# Patient Record
Sex: MALE | Race: WHITE | NOT HISPANIC OR LATINO | Employment: FULL TIME | URBAN - METROPOLITAN AREA
[De-identification: names, ages, dates, MRNs, and addresses within clinical notes are randomized per-mention and may not be internally consistent; named-entity substitution may affect disease eponyms.]

---

## 2017-01-06 ENCOUNTER — OFFICE VISIT (OUTPATIENT)
Dept: FAMILY MEDICINE | Facility: CLINIC | Age: 47
End: 2017-01-06
Payer: COMMERCIAL

## 2017-01-06 DIAGNOSIS — G89.4 CHRONIC PAIN SYNDROME: Primary | ICD-10-CM

## 2017-01-06 DIAGNOSIS — M54.2 NECK PAIN: ICD-10-CM

## 2017-01-06 PROCEDURE — 99441 ZZC PHYSICIAN TELEPHONE EVALUATION 5-10 MIN: CPT | Performed by: FAMILY MEDICINE

## 2017-01-06 RX ORDER — OXYCODONE HYDROCHLORIDE AND ACETAMINOPHEN 10; 325 MG/1; MG/1
2 TABLET ORAL 4 TIMES DAILY
Qty: 120 TABLET | Refills: 0 | Status: SHIPPED | OUTPATIENT
Start: 2017-01-18 | End: 2017-01-06

## 2017-01-06 RX ORDER — OXYCODONE HYDROCHLORIDE 30 MG/1
30 TABLET ORAL 4 TIMES DAILY
Qty: 60 TABLET | Refills: 0 | Status: SHIPPED | OUTPATIENT
Start: 2017-01-25 | End: 2017-01-17

## 2017-01-06 RX ORDER — OXYCODONE HYDROCHLORIDE AND ACETAMINOPHEN 10; 325 MG/1; MG/1
2 TABLET ORAL 4 TIMES DAILY
Qty: 120 TABLET | Refills: 0 | Status: SHIPPED | OUTPATIENT
Start: 2017-02-01 | End: 2017-02-10

## 2017-01-06 RX ORDER — DIAZEPAM 10 MG
10 TABLET ORAL AT BEDTIME
Qty: 30 TABLET | Refills: 2 | Status: SHIPPED | OUTPATIENT
Start: 2017-01-11 | End: 2017-02-28

## 2017-01-06 RX ORDER — OXYCODONE HYDROCHLORIDE 30 MG/1
30 TABLET ORAL 4 TIMES DAILY
Qty: 60 TABLET | Refills: 0 | Status: SHIPPED | OUTPATIENT
Start: 2017-02-08 | End: 2017-02-10

## 2017-01-06 NOTE — PROGRESS NOTES
"Jose Luis Gonzáles is a 46 year old male who is being evaluated via a telephone visit.      The patient has been notified of following:     \"This telephone visit will be conducted via a call between you and your physician/provider. We have found that certain health care needs can be provided without the need for a physical exam.  This service lets us provide the care you need with a short phone conversation.  If a prescription is necessary we can send it directly to your pharmacy.  If lab work is needed we can place an order for that and you can then stop by our lab to have the test done at a later time.    We will bill your insurance company for this service.  Please check with your medical insurance if this type of visit is covered. You may be responsible for the cost of this type of visit if insurance coverage is denied.  The typical cost is $30 (10min), $59 (11-20min) and $85 (21-30min).  Most often these visits are shorter than 10 minutes.    If during the course of the call the physician/provider feels a telephone visit is not appropriate, you will not be charged for this service.\"       Consent has been obtained for this service by 2 care team members: yes. See the scanned image in the medical record.    Jose Luis Gonzáles complains of  No chief complaint on file.      I have reviewed and updated the patient's Past Medical History, Social History, Family History and Medication List.    ALLERGIES  Clonidine; Ibuprofen; Seroquel; and Valproic acid    Cristina Hughes CMA  January 6, 2017 4:09 PM           Additional provider notes:   Spoke with patient via phone regarding ongoing issues with pain and with anxiety. Things are actually fairly stable in his life. It was 6 months sobriety. Continue to deal with issues with his ex-girlfriend and mother of his child. Is getting much more handle on it and progressing forward light. Looking for employment this year. Actually brings up the possibility of reducing " pain medication in the upcoming months.    ASSESSMENT / PLAN:  (G89.4) Chronic pain syndrome  (primary encounter diagnosis)  Comment: Stable at current dosage refilled  Plan: PERCOCET  MG per tablet, oxyCODONE         (ROXICODONE) 30 MG IR tablet, diazepam (VALIUM)        10 MG tablet, DISCONTINUED: PERCOCET  MG         per tablet, DISCONTINUED: oxyCODONE         (ROXICODONE) 30 MG IR tablet            (M54.2) Neck pain  Comment: As above  Plan: PERCOCET  MG per tablet, oxyCODONE         (ROXICODONE) 30 MG IR tablet, diazepam (VALIUM)        10 MG tablet, DISCONTINUED: PERCOCET  MG         per tablet, DISCONTINUED: oxyCODONE         (ROXICODONE) 30 MG IR tablet               I have reviewed the note as documented above.  This accurately captures the substance of my conversation with the patient,  SJeremi Cui M.D.     Total time of call between patient and provider was 6 minutes

## 2017-01-12 ENCOUNTER — TELEPHONE (OUTPATIENT)
Dept: FAMILY MEDICINE | Facility: CLINIC | Age: 47
End: 2017-01-12

## 2017-01-12 DIAGNOSIS — M54.2 NECK PAIN: ICD-10-CM

## 2017-01-12 DIAGNOSIS — G89.4 CHRONIC PAIN SYNDROME: Primary | ICD-10-CM

## 2017-01-12 NOTE — TELEPHONE ENCOUNTER
Reason for Call:  Other prescription    Detailed comments: per message below once done with prior authorization please contact [facility] at [phone] and let them know its been taken care of so that [facility] can bill insurance.    Phone Number Patient can be reached at: Home number on file [phone] (home)    Best Time: ANY    Can we leave a detailed message on this number? YES    Call taken on 1/12/2017 at 8:51 AM by Teetee Aaron

## 2017-01-12 NOTE — TELEPHONE ENCOUNTER
Routing to PCP to advise, would u like PA started or another medication prescribed?      Mayte Eli, CMA

## 2017-01-12 NOTE — TELEPHONE ENCOUNTER
Reason for Call:  Other prior authorization     Detailed comments: pt calling again for follow states hasnt heard anything back on this please advise and contact pt with follow up     Phone Number Patient can be reached at: Home number on file [phone] (home)    Best Time: ANY    Can we leave a detailed message on this number? YES    Call taken on 1/12/2017 at 2:06 PM by Teetee Aaron

## 2017-01-12 NOTE — TELEPHONE ENCOUNTER
Reason for Call:  Other call back    Detailed comments: Milena is calling from [facility], ph. #[phone] wanting to f/u on PA-Medication request. Milena stated that patient has contacted them several times today and had actually contacted our clinic, was promised that PA would be done today and would get this medication.//Please advise. Thank you.    Phone Number Patient can be reached at: Other phone number:  [phone]    Best Time: Anytime    Can we leave a detailed message on this number? YES    Call taken on 1/12/2017 at 2:25 PM by Mele Jackman

## 2017-01-12 NOTE — TELEPHONE ENCOUNTER
Reason for Call:  Other prescription    Detailed comments: Milena calling from [employer] in [place] stating that pt needs new prior authorization for oxyCODONE (ROXICODONE) 30 MG IR tablet due to has new insurance and the last prior authorization is no longer good for this new insurance. Please advise and contact Insurance company at [phone] , states prior authorization can be done over the phone by calling insurance company.    Phone Number Patient can be reached at: Home number on file [phone] (home)    Best Time: ANY    Can we leave a detailed message on this number? YES    Call taken on 1/12/2017 at 8:48 AM by Teetee Aaron

## 2017-01-13 ENCOUNTER — VIRTUAL VISIT (OUTPATIENT)
Dept: FAMILY MEDICINE | Facility: CLINIC | Age: 47
End: 2017-01-13
Payer: COMMERCIAL

## 2017-01-13 DIAGNOSIS — G89.4 CHRONIC PAIN SYNDROME: Primary | ICD-10-CM

## 2017-01-13 PROCEDURE — 99441 ZZC PHYSICIAN TELEPHONE EVALUATION 5-10 MIN: CPT | Performed by: FAMILY MEDICINE

## 2017-01-13 RX ORDER — OXYCODONE HYDROCHLORIDE 10 MG/1
20-30 TABLET ORAL 4 TIMES DAILY PRN
Qty: 100 TABLET | Refills: 0 | Status: SHIPPED | OUTPATIENT
Start: 2017-01-13 | End: 2017-01-17 | Stop reason: ALTCHOICE

## 2017-01-13 NOTE — PROGRESS NOTES
"Jose Luis Gonzáles is a 46 year old male who is being evaluated via a telephone visit.      The patient has been notified of following:     \"This telephone visit will be conducted via a call between you and your physician/provider. We have found that certain health care needs can be provided without the need for a physical exam.  This service lets us provide the care you need with a short phone conversation.  If a prescription is necessary we can send it directly to your pharmacy.  If lab work is needed we can place an order for that and you can then stop by our lab to have the test done at a later time.    We will bill your insurance company for this service.  Please check with your medical insurance if this type of visit is covered. You may be responsible for the cost of this type of visit if insurance coverage is denied.  The typical cost is $30 (10min), $59 (11-20min) and $85 (21-30min).  Most often these visits are shorter than 10 minutes.    If during the course of the call the physician/provider feels a telephone visit is not appropriate, you will not be charged for this service.\"       Consent has been obtained for this service by 2 care team members: yes. See the scanned image in the medical record.    Jose Luis Gonzáles complains of  Medication Problem      I have reviewed and updated the patient's Past Medical History, Social History, Family History and Medication List.    ALLERGIES  Clonidine; Ibuprofen; Seroquel; and Valproic acid    CAKELLY (MA signature)    Additional provider notes:   I spoke patient via phone regarding his medications. We had authorization for a number of months already but as soon as the calendar turn 2017 evidently they are no longer valid. As far as he knows issue is with the 30 mg oxycodone tablets. I received a form and fax that off this morning for authorization. We are still waiting to hear back approval.  May need to bridge the gap with a different agent until coverage in " place. We had a discussion about equivalent dosages and agents and what reactions he may experience because of this.  He will check with the pharmacy and I will check with our staff.    ASSESSMENT / PLAN:  (G89.4) Chronic pain syndrome  (primary encounter diagnosis)  Comment: Discussion as above.  Plan:      I have reviewed the note as documented above.  This accurately captures the substance of my conversation with the patient,  JANET Cui M.D.     Total time of call between patient and provider was 8 minutes

## 2017-01-13 NOTE — TELEPHONE ENCOUNTER
Pt's pharmacy is calling and  needs someone to call pts insurance company at [phone] to roxi as a high priority because pt is leaving town.  Abbey Lange RN

## 2017-01-13 NOTE — TELEPHONE ENCOUNTER
Form for oxycodone came through and placed on top bin for completion. PA has not been started for this did you want want to do so?      Ashley Disla MA

## 2017-01-13 NOTE — TELEPHONE ENCOUNTER
Jose Luis spoke with pharmacy and was informed if he takes the RX to another pharmacy the PA will have to be restarted. Pt is wondering if PCP could provide a different RX of a different Medication until th e PA is completed.  Pt would  today.  Please advise.  Best contact number [phone]    JANET Ro MA

## 2017-01-16 ENCOUNTER — TELEPHONE (OUTPATIENT)
Dept: FAMILY MEDICINE | Facility: CLINIC | Age: 47
End: 2017-01-16

## 2017-01-16 NOTE — TELEPHONE ENCOUNTER
[facility] - [phone]    Pharmacist not comfortable filling Oxycodone 30 MG medication until ok from PCP listed below given.     They are calling stating that 30 MG tablets are too early to fill because pt picked up 10 MG tablets on 1/13/17 with same directions.  Before fill medication they are wanting ok from PCP to fill 30 MG tablets (as they looked at  database regarding 10 MG tablets)    It seems like he should have enough of the medication until Feb, 2017 before he needs 30 MG tablets per pharmacist.    Gave pharmacist our # and name if pt has questions.        Luciano Berman MA

## 2017-01-16 NOTE — TELEPHONE ENCOUNTER
Oxycodone IR 10 mg tablets rx'd on 1/13/17 should last 8-10 days. He would be due for fill of the 30 mg tabs on 1/21-23/17.   No fill today but pcp can also review this tomorrow

## 2017-01-16 NOTE — TELEPHONE ENCOUNTER
Received PA approval for pt's medication -     Faxed to pharmacy - placed in [facility].    Luciano Berman MA

## 2017-01-16 NOTE — TELEPHONE ENCOUNTER
Received new completed PA form - faxed and placed in PA folder until we receive response from insurance.    Luciano Berman MA

## 2017-01-16 NOTE — TELEPHONE ENCOUNTER
Reason for Call:  Medication or medication refill:    Do you use a [facility]?  Name of the pharmacy and phone number for the current request:  n/a    Name of the medication requested: oxycodone     Other request: Call Valerie @ [facility] in [place] has a question re:  Oxycodone script.  Pt is anxious to get medication.     Can we leave a detailed message on this number? YES    Phone number patient can be reached at: Cell number on file:    Telephone Information:   Mobile [phone]       Best Time: anytime    Call taken on 1/16/2017 at 4:23 PM by Emily Lopez

## 2017-01-17 ENCOUNTER — VIRTUAL VISIT (OUTPATIENT)
Dept: FAMILY MEDICINE | Facility: CLINIC | Age: 47
End: 2017-01-17
Payer: COMMERCIAL

## 2017-01-17 DIAGNOSIS — M54.2 NECK PAIN: ICD-10-CM

## 2017-01-17 DIAGNOSIS — G89.4 CHRONIC PAIN SYNDROME: Primary | ICD-10-CM

## 2017-01-17 PROCEDURE — 99441 ZZC PHYSICIAN TELEPHONE EVALUATION 5-10 MIN: CPT | Performed by: FAMILY MEDICINE

## 2017-01-17 RX ORDER — OXYCODONE HYDROCHLORIDE 30 MG/1
30 TABLET ORAL 4 TIMES DAILY
Qty: 60 TABLET | Refills: 0 | Status: SHIPPED | OUTPATIENT
Start: 2017-01-18 | End: 2017-02-10

## 2017-01-17 NOTE — PROGRESS NOTES
"  SUBJECTIVE:                                                    Jose Luis Gonzáles is a 46 year old male who presents to clinic today for the following health issues:    1. Follow up on pain medications - pt states he has some of the 10 MG tablets still - please review telephone encounter from 1/16/17.   - pt states he overdid it this weekend -     Jose Luis Gonzáles is a 46 year old male who is being evaluated via a telephone visit.      The patient has been notified of following:     \"This telephone visit will be conducted via a call between you and your physician/provider. We have found that certain health care needs can be provided without the need for a physical exam.  This service lets us provide the care you need with a short phone conversation.  If a prescription is necessary we can send it directly to your pharmacy.  If lab work is needed we can place an order for that and you can then stop by our lab to have the test done at a later time.    We will bill your insurance company for this service.  Please check with your medical insurance if this type of visit is covered. You may be responsible for the cost of this type of visit if insurance coverage is denied.  The typical cost is $30 (10min), $59 (11-20min) and $85 (21-30min).  Most often these visits are shorter than 10 minutes.    If during the course of the call the physician/provider feels a telephone visit is not appropriate, you will not be charged for this service.\"       Consent has been obtained for this service by 1 care team members: yes. See the scanned image in the medical record.    Jose Luis Gonzáles complains of  Recheck Medication      I have reviewed and updated the patient's Past Medical History, Social History, Family History and Medication List.    ALLERGIES  Clonidine; Ibuprofen; Seroquel; and Valproic acid    Will Berman MA   (MA signature)    Additional provider notes:   Spoke with patient via phone regarding chronic pain " disorder. We have been awaiting a decision on coverage of his oxycodone 30 mg tablets. I prescribed the 10 mg tablets in lieu of the 30 mg tablets, not knowing when authorization would be decided.  It has now been approved and the patient needs a new prescription for 30 mg tablets. However he is concerned about when the pharmacy will be able to fill this. By dates it should be either the 20th 21st of this month and we reviewed his prescriptions through the chart and through the database. Patient is doing okay for now just wants to make sure he is not going to run out.  Has been quite active recently (snowmobiling), working at the boat show upcoming and wants to make sure he is ready for that    ASSESSMENT / PLAN:  (G89.4) Chronic pain syndrome  (primary encounter diagnosis)  Comment: Looks like we can get back on his stable and monitor regimented dosage  Plan: oxyCODONE (ROXICODONE) 30 MG IR tablet            (M54.2) Neck pain  Comment: As above  Plan: oxyCODONE (ROXICODONE) 30 MG IR tablet             I have reviewed the note as documented above.  This accurately captures the substance of my conversation with the patient,  JANET Cui M.D.     Total time of call between patient and provider was 6 minutes

## 2017-01-17 NOTE — TELEPHONE ENCOUNTER
Called pt and gave him info below. Pt understood and said that he is ok on medication right now. He was just trying to drop off 10 mg endocet script early to be on file because of the location of the pharmacy.     Called pharmacy and gave them info below to not fill. Pharmacist will wait to hear back on any additional info from PCP below proceeding with refill.     Routing to PCP to review when he returns.    Mayte Eli, CMA

## 2017-01-27 DIAGNOSIS — F41.9 ANXIETY: Primary | ICD-10-CM

## 2017-01-27 RX ORDER — CLONAZEPAM 1 MG/1
TABLET ORAL
Qty: 180 TABLET | Refills: 0 | Status: SHIPPED | OUTPATIENT
Start: 2017-01-27 | End: 2017-02-23

## 2017-01-27 NOTE — TELEPHONE ENCOUNTER
CLONAZEPAM       Last Written Prescription Date:  12/28/16  Last Fill Quantity: 180,   # refills: 0  Last Office Visit with [facility], [facility] or [facility] prescribing provider: 01/6/17 Dr. Cui    Future Office visit:       Routing refill request to provider for review/approval because:  Drug not on the [facility], [facility] or [facility] refill protocol or controlled substance    TRAVON Velasco)

## 2017-02-08 ENCOUNTER — TELEPHONE (OUTPATIENT)
Dept: FAMILY MEDICINE | Facility: CLINIC | Age: 47
End: 2017-02-08

## 2017-02-08 DIAGNOSIS — M54.2 NECK PAIN: ICD-10-CM

## 2017-02-08 DIAGNOSIS — G89.4 CHRONIC PAIN SYNDROME: Primary | ICD-10-CM

## 2017-02-08 NOTE — TELEPHONE ENCOUNTER
Reason for Call:  Other     Detailed comments: patient right now cannot make an appointment, trying to fix his vehicle. Patient wants to talk to Dr Cui about what is going on in his life and having adverse side effects on medication.    [facility] [address] [place] [address] [address] AT [facility]    Phone Number Patient can be reached at: Home number on file [phone] (home)    Best Time: any    Can we leave a detailed message on this number? YES    Call taken on 2/8/2017 at 2:46 PM by Lili Del Angel

## 2017-02-10 RX ORDER — OXYCODONE HYDROCHLORIDE AND ACETAMINOPHEN 10; 325 MG/1; MG/1
2 TABLET ORAL 4 TIMES DAILY
Qty: 120 TABLET | Refills: 0 | Status: SHIPPED | OUTPATIENT
Start: 2017-02-15 | End: 2017-02-28

## 2017-02-10 RX ORDER — OXYCODONE HYDROCHLORIDE 30 MG/1
30 TABLET ORAL 4 TIMES DAILY
Qty: 60 TABLET | Refills: 0 | Status: SHIPPED | OUTPATIENT
Start: 2017-02-22 | End: 2017-02-28

## 2017-02-10 NOTE — TELEPHONE ENCOUNTER
Reason for Call:  Other prescription    Detailed comments: 10 mg endocet waiting on this rx can you please call back and advise    Phone Number Patient can be reached at: [phone] (L  Best Time: any    Can we leave a detailed message on this number? YES    Call taken on 2/10/2017 at 11:45 AM by Phoebe Corley

## 2017-02-10 NOTE — TELEPHONE ENCOUNTER
oxycodone  Last Written Prescription Date: 1/18/17  Last Fill Quantity: 60,  # refills: 0   Last Office Visit with [facility], [facility] or [facility] prescribing provider: 1/17/17    Luciano Berman MA

## 2017-02-23 DIAGNOSIS — F41.9 ANXIETY: ICD-10-CM

## 2017-02-24 NOTE — TELEPHONE ENCOUNTER
clonazePAM      Last Written Prescription Date:  1/27/17  Last Fill Quantity: 180,   # refills: 0  Last Office Visit with [facility], [facility] or [facility] prescribing provider: 1/6/17 Dr. Cui    Future Office visit:    Next 5 appointments (look out 90 days)     Feb 28, 2017  6:40 PM CST   [facility]phone Visit with [facility]ui [facility]   [facility]    [address] [place] [id number] [phone]                   Routing refill request to provider for review/approval because:  Drug not on the [facility], [facility] or [facility] refill protocol or controlled substance    TRAVON Velasco (R)

## 2017-02-26 RX ORDER — CLONAZEPAM 1 MG/1
TABLET ORAL
Qty: 180 TABLET | Refills: 0 | Status: SHIPPED | OUTPATIENT
Start: 2017-02-26 | End: 2017-03-24

## 2017-02-28 ENCOUNTER — VIRTUAL VISIT (OUTPATIENT)
Dept: FAMILY MEDICINE | Facility: CLINIC | Age: 47
End: 2017-02-28
Payer: COMMERCIAL

## 2017-02-28 DIAGNOSIS — M54.2 NECK PAIN: ICD-10-CM

## 2017-02-28 DIAGNOSIS — G89.4 CHRONIC PAIN SYNDROME: ICD-10-CM

## 2017-02-28 PROCEDURE — 99441 ZZC PHYSICIAN TELEPHONE EVALUATION 5-10 MIN: CPT | Performed by: FAMILY MEDICINE

## 2017-02-28 RX ORDER — DIAZEPAM 10 MG
10 TABLET ORAL AT BEDTIME
Qty: 30 TABLET | Refills: 2 | Status: SHIPPED | OUTPATIENT
Start: 2017-02-28 | End: 2017-03-20

## 2017-02-28 RX ORDER — OXYCODONE HYDROCHLORIDE AND ACETAMINOPHEN 10; 325 MG/1; MG/1
2 TABLET ORAL 4 TIMES DAILY
Qty: 120 TABLET | Refills: 0 | Status: SHIPPED | OUTPATIENT
Start: 2017-03-01 | End: 2017-02-28

## 2017-02-28 RX ORDER — OXYCODONE HYDROCHLORIDE AND ACETAMINOPHEN 10; 325 MG/1; MG/1
2 TABLET ORAL 4 TIMES DAILY
Qty: 120 TABLET | Refills: 0 | Status: SHIPPED | OUTPATIENT
Start: 2017-03-15 | End: 2017-03-13

## 2017-02-28 RX ORDER — OXYCODONE HYDROCHLORIDE 30 MG/1
30 TABLET ORAL 4 TIMES DAILY
Qty: 60 TABLET | Refills: 0 | Status: SHIPPED | OUTPATIENT
Start: 2017-03-08 | End: 2017-03-17

## 2017-02-28 ASSESSMENT — ANXIETY QUESTIONNAIRES
5. BEING SO RESTLESS THAT IT IS HARD TO SIT STILL: NEARLY EVERY DAY
3. WORRYING TOO MUCH ABOUT DIFFERENT THINGS: NEARLY EVERY DAY
6. BECOMING EASILY ANNOYED OR IRRITABLE: MORE THAN HALF THE DAYS
1. FEELING NERVOUS, ANXIOUS, OR ON EDGE: NEARLY EVERY DAY
7. FEELING AFRAID AS IF SOMETHING AWFUL MIGHT HAPPEN: NEARLY EVERY DAY
2. NOT BEING ABLE TO STOP OR CONTROL WORRYING: NEARLY EVERY DAY
IF YOU CHECKED OFF ANY PROBLEMS ON THIS QUESTIONNAIRE, HOW DIFFICULT HAVE THESE PROBLEMS MADE IT FOR YOU TO DO YOUR WORK, TAKE CARE OF THINGS AT HOME, OR GET ALONG WITH OTHER PEOPLE: SOMEWHAT DIFFICULT
GAD7 TOTAL SCORE: 20

## 2017-02-28 ASSESSMENT — PATIENT HEALTH QUESTIONNAIRE - PHQ9: 5. POOR APPETITE OR OVEREATING: NEARLY EVERY DAY

## 2017-02-28 NOTE — MR AVS SNAPSHOT
After Visit Summary   2/28/2017    Jose Luis Christoph Gonzáles    MRN: [id number]           Patient Information     Date Of Birth          1970        Visit Information        Provider Department      2/28/2017 6:40 PM Tonie Cui MD [facility]        Today's Diagnoses     Chronic pain syndrome        Neck pain           Follow-ups after your visit        Who to contact     If you have questions or need follow up information about today's clinic visit or your schedule please contact [facility] directly at [phone].  Normal or non-critical lab and imaging results will be communicated to you by [employer], letter or phone within 4 business days after the clinic has received the results. If you do not hear from us within 7 days, please contact the clinic through [employer] or phone. If you have a critical or abnormal lab result, we will notify you by phone as soon as possible.  Submit refill requests through [employer] or call your pharmacy and they will forward the refill request to us. Please allow 3 business days for your refill to be completed.          Additional Information About Your Visit        MyChart Information     [employer] gives you secure access to your electronic health record. If you see a primary care provider, you can also send messages to your care team and make appointments. If you have questions, please call your primary care clinic.  If you do not have a primary care provider, please call [phone] and they will assist you.        Care EveryWhere ID     This is your Care EveryWhere ID. This could be used by other organizations to access your [facility] medical records  [id number]         Blood Pressure from Last 3 Encounters:   12/12/16 128/78   11/22/16 128/84   11/02/16 118/76    Weight from Last 3 Encounters:   12/12/16 171 lb 1.6 oz (77.6 kg)   11/22/16 168 lb 1.6 oz (76.2 kg)   11/02/16 171 lb (77.6 kg)              Today, you had the  following     No orders found for display         Today's Medication Changes          These changes are accurate as of: 2/28/17  7:20 PM.  If you have any questions, ask your nurse or doctor.               Start taking these medicines.        Dose/Directions    PERCOCET  MG per tablet   Used for:  Chronic pain syndrome, Neck pain   Generic drug:  oxyCODONE-acetaminophen        Dose:  2 tablet   Start taking on:  3/15/2017   Take 2 tablets by mouth 4 times daily In combination with oxy IR.   Quantity:  120 tablet   Refills:  0            Where to get your medicines      Some of these will need a paper prescription and others can be bought over the counter.  Ask your nurse if you have questions.     Bring a paper prescription for each of these medications     diazepam 10 MG tablet    oxyCODONE 30 MG IR tablet    PERCOCET  MG per tablet                Primary Care Provider Office Phone # Fax #    Tonie Zach Cui [phone]       [place] [id number]        Thank you!     Thank you for choosing [facility]  for your care. Our goal is always to provide you with excellent care. Hearing back from our patients is one way we can continue to improve our services. Please take a few minutes to complete the written survey that you may receive in the mail after your visit with us. Thank you!             Your Updated Medication List - Protect others around you: Learn how to safely use, store and throw away your medicines at www.disposemymeds.org.          This list is accurate as of: 2/28/17  7:20 PM.  Always use your most recent med list.                   Brand Name Dispense Instructions for use    clonazePAM 1 MG tablet    klonoPIN    180 tablet    TAKE 1-2 TABLETS BY MOUTH THREE TIMES DAILY AS NEEDED FOR ANXIETY       diazepam 10 MG tablet    VALIUM    30 tablet    Take 1 tablet (10 mg) by mouth At Bedtime       finasteride 5  MG tablet    PROSCAR    30 tablet    Take 1 tablet (5 mg) by mouth daily       omeprazole 20 MG tablet    priLOSEC OTC    90 tablet    Take 1 tablet (20 mg) by mouth daily       oxyCODONE 30 MG IR tablet   Start taking on:  3/8/2017    ROXICODONE    60 tablet    Take 1 tablet (30 mg) by mouth 4 times daily In combination with percocet.       PERCOCET  MG per tablet   Generic drug:  oxyCODONE-acetaminophen   Start taking on:  3/15/2017     120 tablet    Take 2 tablets by mouth 4 times daily In combination with oxy IR.       polyethylene glycol powder    MIRALAX/GLYCOLAX    527 g    STIR 17 GM OF POWDER (SEE MINGO INSIDE CAP) IN 8-OZ OF LIQUID UNTIL COMPLETELY DISSOLVED. DRINK THE SOLUTION DAILY OR AS DIRECTED.       tretinoin 0.1 % cream    RETIN-A    45 g    Apply topically At Bedtime

## 2017-03-01 ENCOUNTER — TELEPHONE (OUTPATIENT)
Dept: FAMILY MEDICINE | Facility: CLINIC | Age: 47
End: 2017-03-01

## 2017-03-01 ASSESSMENT — ANXIETY QUESTIONNAIRES: GAD7 TOTAL SCORE: 20

## 2017-03-01 ASSESSMENT — PATIENT HEALTH QUESTIONNAIRE - PHQ9: SUM OF ALL RESPONSES TO PHQ QUESTIONS 1-9: 13

## 2017-03-01 NOTE — PROGRESS NOTES
"  SUBJECTIVE:                                                    Jose Luis Gonzáles is a 46 year old male who presents to clinic today for the following health issues:      Anxiety Follow-Up    Status since last visit: Worsened  - hasn't been taking his meds while driving his kids around    Other associated symptoms:Depression    Complicating factors:   Significant life event: Yes-  Has custody of his daughter, trying to get custody of his son -- pt states close friend had their dad die (was his parent's best friend)   Current substance abuse: None  Depression symptoms: Yes-    ODALYS-7 SCORE 1/13/2016 8/19/2016 9/26/2016   Total Score - - -   Total Score 12 18 12        GAD7         Chronic Pain Follow-Up       Type / Location of Pain: back  Analgesia/pain control:       Recent changes:  improved      Overall control: Tolerable with discomfort  Activity level/function:      Daily activities:  Able to do all daily activities    Work:  not applicable  Adverse effects:  No  Adherance    Taking medication as directed?  Yes    Participating in other treatments: Spine X (back exercises)  Risk Factors:    Sleep:  Poor    Mood/anxiety:  worsened    Recent family or social stressors:  none noted    Other aggravating factors: none  PHQ-9 SCORE 1/13/2016 8/19/2016 9/26/2016   Total Score - - -   Total Score 13 22 8     ODALYS-7 SCORE 1/13/2016 8/19/2016 9/26/2016   Total Score - - -   Total Score 12 18 12     Encounter-Level CSA - 12/12/2016:                 Controlled Substance Agreement - Scan on 12/13/2016 10:02 AM : CONTROLLED SUBSTANCE AGREEMENT 12/12/16 (below)                 Jose Luis Gonzáles is a 46 year old male who is being evaluated via a telephone visit.      The patient has been notified of following:     \"This telephone visit will be conducted via a call between you and your physician/provider. We have found that certain health care needs can be provided without the need for a physical exam.  This service lets us " "provide the care you need with a short phone conversation.  If a prescription is necessary we can send it directly to your pharmacy.  If lab work is needed we can place an order for that and you can then stop by our lab to have the test done at a later time.    We will bill your insurance company for this service.  Please check with your medical insurance if this type of visit is covered. You may be responsible for the cost of this type of visit if insurance coverage is denied.  The typical cost is $30 (10min), $59 (11-20min) and $85 (21-30min).  Most often these visits are shorter than 10 minutes.    If during the course of the call the physician/provider feels a telephone visit is not appropriate, you will not be charged for this service.\"       Consent has been obtained for this service by 1 care team members: yes. See the scanned image in the medical record.    Jose Luis Christoph Gonzáles complains of  Back Pain and Anxiety      I have reviewed and updated the patient's Past Medical History, Social History, Family History and Medication List.    ALLERGIES  Clonidine; Ibuprofen; Seroquel [quetiapine]; and Valproic acid    Will Berman MA   (MA signature)    Additional provider notes:     Contacted via phone in follow-up of chronic pain. Doing well at current dosage. Anxiety is been a bit heightened with some ongoing home issues that are actually going his way for once. Patient is on the verge of getting custody of all of his kids and CPS is involved in supporting him. He feels much more hopeful about this. No side effects from the medication and he like to continue with the current dosage    ASSESSMENT / PLAN:  (G89.4) Chronic pain syndrome  Comment: Stable on current dosage. Continue to monitor  Plan: PERCOCET  MG per tablet, oxyCODONE         (ROXICODONE) 30 MG IR tablet, diazepam (VALIUM)        10 MG tablet, DISCONTINUED: PERCOCET  MG         per tablet            (M54.2) Neck pain  Comment: As above  Plan: " PERCOCET  MG per tablet, oxyCODONE         (ROXICODONE) 30 MG IR tablet, diazepam (VALIUM)        10 MG tablet, DISCONTINUED: PERCOCET  MG         per tablet           .    I have reviewed the note as documented above.  This accurately captures the substance of my conversation with the patient,  SJeremi Cui M.D.     Total time of call between patient and provider was 6 minutes

## 2017-03-01 NOTE — TELEPHONE ENCOUNTER
Jose Luis spoke with Dr Cui Yesterday 02-28; scripts should be at the /endocet brand); Rep unable to reach anyone to check on scripts;   Pt plans to head to the clinic in about one hour; (9am);   Please call [phone]  Thank you  TUAN Krueger  Pt Rep [facility]

## 2017-03-08 ENCOUNTER — TELEPHONE (OUTPATIENT)
Dept: NURSING | Facility: CLINIC | Age: 47
End: 2017-03-08

## 2017-03-08 ENCOUNTER — TELEPHONE (OUTPATIENT)
Dept: FAMILY MEDICINE | Facility: CLINIC | Age: 47
End: 2017-03-08
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
## 2017-03-08 NOTE — TELEPHONE ENCOUNTER
This writer attempted to contact Jose Luis on 03/08/17.    Was call answered?  No.  Left message on voicemail with information to call me back.    If patient calls back, please Contact Clinic RN team. If no one available, send encounter message    Akilah Rogers

## 2017-03-13 DIAGNOSIS — M54.2 NECK PAIN: ICD-10-CM

## 2017-03-13 DIAGNOSIS — G89.4 CHRONIC PAIN SYNDROME: ICD-10-CM

## 2017-03-13 RX ORDER — OXYCODONE HYDROCHLORIDE AND ACETAMINOPHEN 10; 325 MG/1; MG/1
2 TABLET ORAL 4 TIMES DAILY
Qty: 120 TABLET | Refills: 0 | Status: SHIPPED | OUTPATIENT
Start: 2017-03-14 | End: 2017-03-17

## 2017-03-13 NOTE — TELEPHONE ENCOUNTER
LM informing pt.  New rx placed at  with note, that pt needs to turn in Rx dated 3/15/17.    Jenny BREEN, Patient Care
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
## 2017-03-17 DIAGNOSIS — M54.2 NECK PAIN: ICD-10-CM

## 2017-03-17 DIAGNOSIS — G89.4 CHRONIC PAIN SYNDROME: ICD-10-CM

## 2017-03-17 NOTE — TELEPHONE ENCOUNTER
Reason for Call:  Medication or medication refill:    Do you use a [facility]?  Name of the pharmacy and phone number for the current request:  Written RX    Name of the medication requested: Valium 10 mg, Oxycodone 30 mg, Percocet  mg    Other request: please call when approved so my mom Evette Carnes can   Also please call me back about the broken ribs - sending [employer] message    Can we leave a detailed message on this number? YES    Phone number patient can be reached at: Cell number on file:    Telephone Information:   Mobile [phone]       Best Time: any     Call taken on 3/17/2017 at 3:54 PM by Phoebe Corley

## 2017-03-17 NOTE — TELEPHONE ENCOUNTER
Roxicodone    30mg  Last Written Prescription Date:  03/08/17  Last Fill Quantity: 60,   # refills: 0  Last Office Visit with [facility], [facility] or [facility] prescribing provider: 1/6/17 Dr. Cui    Future Office visit:    Next 5 appointments (look out 90 days)     Apr 03, 2017  9:40 AM CDT   Telephone Visit with Tonie Cui MD   [facility]    [address] [place] [address]   [phone]                   Routing refill request to provider for review/approval because:  Drug not on the [facility], [facility] or [facility] refill protocol or controlled substance    Percocet      Last Written Prescription Date:  03/14/17  Last Fill Quantity: 120,   # refills: 0  Last Office Visit with [facility], [facility] or [facility] prescribing provider: 01/6/17 Dr. Cui    Future Office visit:    Next 5 appointments (look out 90 days)     Apr 03, 2017  9:40 AM CDT   Telephone Visit with [facility]ie Cui MD   [facility]    [address] [place] [address]   [phone]                   Routing refill request to provider for review/approval because:  Drug not on the [facility], [facility] or [facility] refill protocol or controlled substance    Valium      Last Written Prescription Date:  02/28/17  Last Fill Quantity: 30,   # refills: 2  Last Office Visit with [facility], [facility] or [facility] prescribing provider: 01/6/17 Dr. Cui    Future Office visit:    Next 5 appointments (look out 90 days)     Apr 03, 2017  9:40 AM CDT   Telephone Visit with Tonie Cui MD   [facility]    [address] [place] [address]   [phone]                   Routing refill request to provider for review/approval because:  Drug not on the [facility], [facility] or [facility] refill protocol or controlled substance

## 2017-03-19 RX ORDER — DIAZEPAM 10 MG
10 TABLET ORAL AT BEDTIME
Qty: 30 TABLET | Refills: 2 | OUTPATIENT
Start: 2017-03-19

## 2017-03-19 RX ORDER — OXYCODONE HYDROCHLORIDE AND ACETAMINOPHEN 10; 325 MG/1; MG/1
2 TABLET ORAL 4 TIMES DAILY
Qty: 120 TABLET | Refills: 0 | Status: SHIPPED | OUTPATIENT
Start: 2017-03-28 | End: 2017-03-24

## 2017-03-19 RX ORDER — OXYCODONE HYDROCHLORIDE 30 MG/1
30 TABLET ORAL 4 TIMES DAILY
Qty: 60 TABLET | Refills: 0 | Status: SHIPPED | OUTPATIENT
Start: 2017-03-22 | End: 2017-03-24

## 2017-03-19 NOTE — TELEPHONE ENCOUNTER
Refilled pain meds - dated appropriately.  Too early for valium - I prescribed 3 months worth a few weeks ago

## 2017-03-20 RX ORDER — DIAZEPAM 10 MG
10 TABLET ORAL 2 TIMES DAILY PRN
Qty: 60 TABLET | Refills: 0 | Status: SHIPPED | OUTPATIENT
Start: 2017-03-20 | End: 2017-04-18

## 2017-03-20 NOTE — TELEPHONE ENCOUNTER
Pt is asking for a quick call back today to discuss   Pain and swelling issues- change of med dose    Thank you  Jose Luis Gonzáles (Self) [phone]      Ok to leave message

## 2017-03-20 NOTE — TELEPHONE ENCOUNTER
Patient called back, states he would like the valium dose changed back to previous dosing of 5 mg, 2 tabs nightly prn. This will allow him to take one at bedtime and one at 2-3 am as needed for breakthrough pain. Ten milligram tabs are not working to control his pain. Please advise.     DESTINY Siddiqi, Clinical RN [facility].

## 2017-03-20 NOTE — TELEPHONE ENCOUNTER
This writer attempted to contact Jose Luis on 03/20/17.    Was call answered?  No.  Left message on voicemail with information to call me back.    If patient calls back, please Contact Clinic RN team. If no one available, send encounter message    Ivory Siddiqi RN

## 2017-03-20 NOTE — TELEPHONE ENCOUNTER
Called patient -     Informed him of change in Valium script - placed at  with others.    Pt also states having issues with ribs, swelling feet - asked for an appointment. Scheduled for appointment on 3/24/17 at 2 PM    Luciano Berman MA

## 2017-03-24 ENCOUNTER — VIRTUAL VISIT (OUTPATIENT)
Dept: FAMILY MEDICINE | Facility: CLINIC | Age: 47
End: 2017-03-24
Payer: COMMERCIAL

## 2017-03-24 VITALS
BODY MASS INDEX: 23.9 KG/M2 | SYSTOLIC BLOOD PRESSURE: 136 MMHG | OXYGEN SATURATION: 99 % | TEMPERATURE: 97.9 F | WEIGHT: 170.7 LBS | HEART RATE: 99 BPM | RESPIRATION RATE: 16 BRPM | DIASTOLIC BLOOD PRESSURE: 86 MMHG | HEIGHT: 71 IN

## 2017-03-24 DIAGNOSIS — M54.2 NECK PAIN: ICD-10-CM

## 2017-03-24 DIAGNOSIS — F41.9 ANXIETY: ICD-10-CM

## 2017-03-24 DIAGNOSIS — S22.42XA CLOSED FRACTURE OF MULTIPLE RIBS OF LEFT SIDE, INITIAL ENCOUNTER: Primary | ICD-10-CM

## 2017-03-24 DIAGNOSIS — G89.4 CHRONIC PAIN SYNDROME: ICD-10-CM

## 2017-03-24 PROCEDURE — 99213 OFFICE O/P EST LOW 20 MIN: CPT | Performed by: FAMILY MEDICINE

## 2017-03-24 RX ORDER — OXYCODONE HYDROCHLORIDE AND ACETAMINOPHEN 10; 325 MG/1; MG/1
2 TABLET ORAL 4 TIMES DAILY
Qty: 120 TABLET | Refills: 0 | Status: SHIPPED | OUTPATIENT
Start: 2017-04-25 | End: 2017-04-18

## 2017-03-24 RX ORDER — OXYCODONE HYDROCHLORIDE 30 MG/1
30 TABLET ORAL 4 TIMES DAILY
Qty: 60 TABLET | Refills: 0 | Status: SHIPPED | OUTPATIENT
Start: 2017-04-19 | End: 2017-04-18

## 2017-03-24 RX ORDER — CLONAZEPAM 1 MG/1
TABLET ORAL
Qty: 180 TABLET | Refills: 0 | Status: SHIPPED | OUTPATIENT
Start: 2017-03-24 | End: 2017-04-18

## 2017-03-24 RX ORDER — OXYCODONE HYDROCHLORIDE 30 MG/1
30 TABLET ORAL 4 TIMES DAILY
Qty: 60 TABLET | Refills: 0 | Status: SHIPPED | OUTPATIENT
Start: 2017-04-05 | End: 2017-03-24

## 2017-03-24 RX ORDER — OXYCODONE HYDROCHLORIDE AND ACETAMINOPHEN 10; 325 MG/1; MG/1
2 TABLET ORAL 4 TIMES DAILY
Qty: 120 TABLET | Refills: 0 | Status: SHIPPED | OUTPATIENT
Start: 2017-04-11 | End: 2017-03-24

## 2017-03-24 ASSESSMENT — PAIN SCALES - GENERAL: PAINLEVEL: EXTREME PAIN (8)

## 2017-03-24 NOTE — MR AVS SNAPSHOT
After Visit Summary   3/24/2017    Jose Luis Christoph Gonzáles    MRN: [id number]           Patient Information     Date Of Birth          1970        Visit Information        Provider Department      3/24/2017 2:20 PM Tonie Cui MD [facility]        Today's Diagnoses     Closed fracture of multiple ribs of left side, initial encounter    -  1    Chronic pain syndrome        Neck pain        Anxiety           Follow-ups after your visit        Follow-up notes from your care team     Return in about 1 month (around 4/24/2017).      Your next 10 appointments already scheduled     Apr 03, 2017  9:40 AM CDT   Telephone Visit with Tonie Cui MD   [facility]    [address] [facility] [address] [phone]           Note: this is not an onsite visit; there is no need to come to the facility.              Who to contact     If you have questions or need follow up information about today's clinic visit or your schedule please contact [facility] directly at [phone].  Normal or non-critical lab and imaging results will be communicated to you by [employer], letter or phone within 4 business days after the clinic has received the results. If you do not hear from us within 7 days, please contact the clinic through [employer] or phone. If you have a critical or abnormal lab result, we will notify you by phone as soon as possible.  Submit refill requests through [employer] or call your pharmacy and they will forward the refill request to us. Please allow 3 business days for your refill to be completed.          Additional Information About Your Visit        [employer] Information     [employer] gives you secure access to your electronic health record. If you see a primary care provider, you can also send messages to your care team and make appointments. If you have questions, please call your primary care  "clinic.  If you do not have a primary care provider, please call [phone] and they will assist you.        Care EveryWhere ID     This is your Care EveryWhere ID. This could be used by other organizations to access your [facility] medical records  [id number]        Your Vitals Were     Pulse Temperature Respirations Height Pulse Oximetry BMI (Body Mass Index)    99 97.9  F (36.6  C) (Oral) 16 1.796 m (5' 10.7\") 99% 24.01 kg/m2       Blood Pressure from Last 3 Encounters:   03/24/17 136/86   12/12/16 128/78   11/22/16 128/84    Weight from Last 3 Encounters:   03/24/17 77.4 kg (170 lb 11.2 oz)   12/12/16 77.6 kg (171 lb 1.6 oz)   11/22/16 76.2 kg (168 lb 1.6 oz)              Today, you had the following     No orders found for display         Today's Medication Changes          These changes are accurate as of: 3/24/17  2:57 PM.  If you have any questions, ask your nurse or doctor.               Start taking these medicines.        Dose/Directions    oxyCODONE 30 MG IR tablet   Commonly known as:  ROXICODONE   Used for:  Chronic pain syndrome, Neck pain   Started by:  Tonie Cui MD        Dose:  30 mg   Start taking on:  4/19/2017   Take 1 tablet (30 mg) by mouth 4 times daily In combination with percocet.   Quantity:  60 tablet   Refills:  0       PERCOCET  MG per tablet   Used for:  Chronic pain syndrome, Neck pain   Generic drug:  oxyCODONE-acetaminophen   Started by:  Tonie Cui MD        Dose:  2 tablet   Start taking on:  4/25/2017   Take 2 tablets by mouth 4 times daily In combination with oxy IR.   Quantity:  120 tablet   Refills:  0         These medicines have changed or have updated prescriptions.        Dose/Directions    clonazePAM 1 MG tablet   Commonly known as:  klonoPIN   This may have changed:  See the new instructions.   Used for:  Anxiety   Changed by:  Tonie Cui MD        TAKE 1-2 TABLETS BY MOUTH THREE TIMES DAILY AS NEEDED FOR ANXIETY "   Quantity:  180 tablet   Refills:  0            Where to get your medicines      Some of these will need a paper prescription and others can be bought over the counter.  Ask your nurse if you have questions.     Bring a paper prescription for each of these medications     clonazePAM 1 MG tablet    oxyCODONE 30 MG IR tablet    PERCOCET  MG per tablet                Primary Care Provider Office Phone # Fax #    Tonie Zach Cui [phone]       [address] [address]        Thank you!     Thank you for choosing [facility]  for your care. Our goal is always to provide you with excellent care. Hearing back from our patients is one way we can continue to improve our services. Please take a few minutes to complete the written survey that you may receive in the mail after your visit with us. Thank you!             Your Updated Medication List - Protect others around you: Learn how to safely use, store and throw away your medicines at www.disposemymeds.org.          This list is accurate as of: 3/24/17  2:57 PM.  Always use your most recent med list.                   Brand Name Dispense Instructions for use    clonazePAM 1 MG tablet    klonoPIN    180 tablet    TAKE 1-2 TABLETS BY MOUTH THREE TIMES DAILY AS NEEDED FOR ANXIETY       diazepam 10 MG tablet    VALIUM    60 tablet    Take 1 tablet (10 mg) by mouth 2 times daily as needed for sleep       finasteride 5 MG tablet    PROSCAR    30 tablet    Take 1 tablet (5 mg) by mouth daily       omeprazole 20 MG tablet    priLOSEC OTC    90 tablet    Take 1 tablet (20 mg) by mouth daily       oxyCODONE 30 MG IR tablet   Start taking on:  4/19/2017    ROXICODONE    60 tablet    Take 1 tablet (30 mg) by mouth 4 times daily In combination with percocet.       PERCOCET  MG per tablet   Generic drug:  oxyCODONE-acetaminophen   Start taking on:  4/25/2017     120 tablet    Take 2  tablets by mouth 4 times daily In combination with oxy IR.       polyethylene glycol powder    MIRALAX/GLYCOLAX    527 g    STIR 17 GM OF POWDER (SEE MINGO INSIDE CAP) IN 8-OZ OF LIQUID UNTIL COMPLETELY DISSOLVED. DRINK THE SOLUTION DAILY OR AS DIRECTED.       tretinoin 0.1 % cream    RETIN-A    45 g    Apply topically At Bedtime
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
## 2017-04-18 ENCOUNTER — VIRTUAL VISIT (OUTPATIENT)
Dept: FAMILY MEDICINE | Facility: CLINIC | Age: 47
End: 2017-04-18
Payer: COMMERCIAL

## 2017-04-18 ENCOUNTER — TELEPHONE (OUTPATIENT)
Dept: FAMILY MEDICINE | Facility: CLINIC | Age: 47
End: 2017-04-18

## 2017-04-18 DIAGNOSIS — G89.4 CHRONIC PAIN SYNDROME: ICD-10-CM

## 2017-04-18 DIAGNOSIS — M54.2 NECK PAIN: ICD-10-CM

## 2017-04-18 DIAGNOSIS — F41.9 ANXIETY: ICD-10-CM

## 2017-04-18 PROCEDURE — 99441 ZZC PHYSICIAN TELEPHONE EVALUATION 5-10 MIN: CPT | Performed by: FAMILY MEDICINE

## 2017-04-18 RX ORDER — DIAZEPAM 10 MG
10 TABLET ORAL 2 TIMES DAILY PRN
Qty: 60 TABLET | Refills: 0 | Status: SHIPPED | OUTPATIENT
Start: 2017-04-20 | End: 2017-05-24

## 2017-04-18 RX ORDER — OXYCODONE HYDROCHLORIDE AND ACETAMINOPHEN 10; 325 MG/1; MG/1
2 TABLET ORAL 4 TIMES DAILY
Qty: 120 TABLET | Refills: 0 | Status: SHIPPED | OUTPATIENT
Start: 2017-05-09 | End: 2017-05-11

## 2017-04-18 RX ORDER — CLONAZEPAM 1 MG/1
TABLET ORAL
Qty: 180 TABLET | Refills: 0 | Status: SHIPPED | OUTPATIENT
Start: 2017-04-21 | End: 2017-05-24

## 2017-04-18 RX ORDER — OXYCODONE HYDROCHLORIDE 30 MG/1
30 TABLET ORAL 4 TIMES DAILY
Qty: 60 TABLET | Refills: 0 | Status: SHIPPED | OUTPATIENT
Start: 2017-05-04 | End: 2017-05-11

## 2017-04-18 NOTE — MR AVS SNAPSHOT
After Visit Summary   4/18/2017    Jose Luis Christoph Gonzáles    MRN: [id number]           Patient Information     Date Of Birth          1970        Visit Information        Provider Department      4/18/2017 3:00 PM Tonie Cui MD [facility]        Today's Diagnoses     Chronic pain syndrome        Neck pain        Anxiety           Follow-ups after your visit        Who to contact     If you have questions or need follow up information about today's clinic visit or your schedule please contact [facility] directly at [phone].  Normal or non-critical lab and imaging results will be communicated to you by Sproutlinghart, letter or phone within 4 business days after the clinic has received the results. If you do not hear from us within 7 days, please contact the clinic through [employer] or phone. If you have a critical or abnormal lab result, we will notify you by phone as soon as possible.  Submit refill requests through [employer] or call your pharmacy and they will forward the refill request to us. Please allow 3 business days for your refill to be completed.          Additional Information About Your Visit        MyChart Information     [employer] gives you secure access to your electronic health record. If you see a primary care provider, you can also send messages to your care team and make appointments. If you have questions, please call your primary care clinic.  If you do not have a primary care provider, please call [phone] and they will assist you.        Care EveryWhere ID     This is your Care EveryWhere ID. This could be used by other organizations to access your [place] medical records  [id number]         Blood Pressure from Last 3 Encounters:   03/24/17 136/86   12/12/16 128/78   11/22/16 128/84    Weight from Last 3 Encounters:   03/24/17 77.4 kg (170 lb 11.2 oz)   12/12/16 77.6 kg (171 lb 1.6 oz)   11/22/16 76.2 kg (168 lb 1.6 oz)               Today, you had the following     No orders found for display         Where to get your medicines      Some of these will need a paper prescription and others can be bought over the counter.  Ask your nurse if you have questions.     Bring a paper prescription for each of these medications     clonazePAM 1 MG tablet    diazepam 10 MG tablet    oxyCODONE 30 MG IR tablet    PERCOCET  MG per tablet          Primary Care Provider Office Phone # Fax #    Tonie Zach Cui [phone]       [address] [address]        Thank you!     Thank you for choosing [facility]  for your care. Our goal is always to provide you with excellent care. Hearing back from our patients is one way we can continue to improve our services. Please take a few minutes to complete the written survey that you may receive in the mail after your visit with us. Thank you!             Your Updated Medication List - Protect others around you: Learn how to safely use, store and throw away your medicines at www.disposemymeds.org.          This list is accurate as of: 4/18/17  3:18 PM.  Always use your most recent med list.                   Brand Name Dispense Instructions for use    clonazePAM 1 MG tablet   Start taking on:  4/21/2017    klonoPIN    180 tablet    TAKE 1-2 TABLETS BY MOUTH THREE TIMES DAILY AS NEEDED FOR ANXIETY       diazepam 10 MG tablet   Start taking on:  4/20/2017    VALIUM    60 tablet    Take 1 tablet (10 mg) by mouth 2 times daily as needed for sleep       finasteride 5 MG tablet    PROSCAR    30 tablet    Take 1 tablet (5 mg) by mouth daily       omeprazole 20 MG tablet    priLOSEC OTC    90 tablet    Take 1 tablet (20 mg) by mouth daily       oxyCODONE 30 MG IR tablet   Start taking on:  5/4/2017    ROXICODONE    60 tablet    Take 1 tablet (30 mg) by mouth 4 times daily In combination with percocet.       PERCOCET  MG per tablet    Generic drug:  oxyCODONE-acetaminophen   Start taking on:  5/9/2017     120 tablet    Take 2 tablets by mouth 4 times daily In combination with oxy IR.       polyethylene glycol powder    MIRALAX/GLYCOLAX    527 g    STIR 17 GM OF POWDER (SEE MINGO INSIDE CAP) IN 8-OZ OF LIQUID UNTIL COMPLETELY DISSOLVED. DRINK THE SOLUTION DAILY OR AS DIRECTED.       tretinoin 0.1 % cream    RETIN-A    45 g    Apply topically At Bedtime

## 2017-04-18 NOTE — TELEPHONE ENCOUNTER
Reason for Call:  Same Day Appointment, Requested Provider:  Tonie Cui M.D.    PCP: Tonie Cui    Reason for visit: Insurance changes     Additional comments: Pt is underdoing insurance changes to [place] and would like to speak further via telephone visit to see if his children can be accepted as well for either today  or tomorrow's schedule for Dr. Cui.  He would be willing to speak with Dr. Cui at his next available opening this week.    Can we leave a detailed message on this number? YES    Phone number patient can be reached at: Home number on file [phone] (home)    Best Time: Anytime    Call taken on 4/18/2017 at 8:51 AM by Ashok Forde

## 2017-04-18 NOTE — TELEPHONE ENCOUNTER
Called patient - pt states he is requesting same day appointment (phone visit) for himself today. (med check, follow up ribs)    Pt states his insurance changed, will need new PA's for his medications (informed pt I can't submit PA's until new insurance kicks in)    Pt's kids are not in system. He will call in and have them added as new pt's to system. (they are in school - request is not for them to be seen today)    Luciano Berman MA

## 2017-04-18 NOTE — TELEPHONE ENCOUNTER
Probably no need for an appointment, per se. If he is wondering if I would be willing to take on his kids, the answer is yes. As far as whether their insurance is accepted, I do not know - that is an issue for the business office I guess. And if he is wondering whether I can squeeze his kids in to be seen, but find out what it is for because that can help us determine whether a same day is appropriate or not

## 2017-04-18 NOTE — PROGRESS NOTES
"  SUBJECTIVE:                                                    Jose Luis Gonzáles is a 46 year old male who presents to clinic today for the following health issues:        Chronic Pain Follow-Up       Type / Location of Pain: rib injury, low back, neck  Analgesia/pain control:       Recent changes:  Rib injury - getting a little worse on the opposite side, healing process is slow  - low back/neck - same      Overall control: Tolerable with discomfort  Activity level/function:      Daily activities:  Able to do all daily activities (states he over does it)    Work:  not applicable  Adverse effects:  No  Adherance    Taking medication as directed?  Yes    Participating in other treatments: None  Risk Factors:    Sleep:  Poor    Mood/anxiety: today has been a struggle, but overall has been improved    Recent family or social stressors:  Police came this morning, ex fiance was removed from home (was using Meth) -- stressed out about where his kids will be    Other aggravating factors: none  PHQ-9 SCORE 8/19/2016 9/26/2016 2/28/2017   Total Score - - -   Total Score 22 8 13     ODALYS-7 SCORE 8/19/2016 9/26/2016 2/28/2017   Total Score - - -   Total Score 18 12 20     Encounter-Level CSA - 12/12/2016:                 Controlled Substance Agreement - Scan on 12/13/2016 10:02 AM : CONTROLLED SUBSTANCE AGREEMENT 12/12/16 (below)                 Jose Luis Gonzáles is a 46 year old male who is being evaluated via a telephone visit.      The patient has been notified of following:     \"This telephone visit will be conducted via a call between you and your physician/provider. We have found that certain health care needs can be provided without the need for a physical exam.  This service lets us provide the care you need with a short phone conversation.  If a prescription is necessary we can send it directly to your pharmacy.  If lab work is needed we can place an order for that and you can then stop by our lab to have the " "test done at a later time.    We will bill your insurance company for this service.  Please check with your medical insurance if this type of visit is covered. You may be responsible for the cost of this type of visit if insurance coverage is denied.  The typical cost is $30 (10min), $59 (11-20min) and $85 (21-30min).  Most often these visits are shorter than 10 minutes.    If during the course of the call the physician/provider feels a telephone visit is not appropriate, you will not be charged for this service.\"       Consent has been obtained for this service by 1 care team members: yes. See the scanned image in the medical record.    Jose Luis Christoph Gonzáles complains of  Recheck Medication and Rib Injury      I have reviewed and updated the patient's Past Medical History, Social History, Family History and Medication List.    ALLERGIES  Clonidine; Contrast dye; Ibuprofen; Seroquel [quetiapine]; and Valproic acid    Will Berman MA   (MA signature)    Additional provider notes:     Spoke with patient via phone about ongoing issues with pain. Still dealing with some of the rib cage related pain of this is slowly getting better. There've been some issues with changes in insurance and whether we'll need authorizations for his baseline pain medication. Discussed with patient that we will address that issue as it comes up here typically the pharmacies will send us issues regarding coverage and we can handle that at the time. Sometimes there is a inherent delay in the system and we will have to be ready for that. Anxiety is up a little. He thought he had custody of his son and stepchildren but now there are some County issues and issues regarding his ex-girlfriend and her ongoing use of meth.      ASSESSMENT / PLAN:  (G89.4) Chronic pain syndrome  Comment: Stable on current therapy. Refilled. PA if needed as his therapy is stable and monitored  Plan: oxyCODONE (ROXICODONE) 30 MG IR tablet,         PERCOCET  MG per " tablet, diazepam         (VALIUM) 10 MG tablet            (M54.2) Neck pain  Comment: As above  Plan: oxyCODONE (ROXICODONE) 30 MG IR tablet,         PERCOCET  MG per tablet, diazepam         (VALIUM) 10 MG tablet            (F41.9) Anxiety  Comment: Stressful situations but doing okay. Continue same therapy  Plan: clonazePAM (KLONOPIN) 1 MG tablet               I have reviewed the note as documented above.  This accurately captures the substance of my conversation with the patient,  SJeremi Cui M.D.     Total time of call between patient and provider was 7 minutes

## 2017-04-28 ENCOUNTER — TELEPHONE (OUTPATIENT)
Dept: FAMILY MEDICINE | Facility: CLINIC | Age: 47
End: 2017-04-28

## 2017-04-28 DIAGNOSIS — M54.2 NECK PAIN: ICD-10-CM

## 2017-04-28 DIAGNOSIS — G89.4 CHRONIC PAIN SYNDROME: ICD-10-CM

## 2017-04-28 NOTE — TELEPHONE ENCOUNTER
..Reason for Call:    FYI: scripts at     Detailed comments: mother, Evette Rangel will be picking those up today 04-28  Phone Number Patient can be reached at: Home number on file [phone] (home)    Best Time: anytime    Can we leave a detailed message on this number? YES    Call taken on 4/28/2017 at 10:26 AM by Clara Krueger

## 2017-05-11 DIAGNOSIS — G89.4 CHRONIC PAIN SYNDROME: ICD-10-CM

## 2017-05-11 DIAGNOSIS — M54.2 NECK PAIN: ICD-10-CM

## 2017-05-11 RX ORDER — OXYCODONE HYDROCHLORIDE AND ACETAMINOPHEN 10; 325 MG/1; MG/1
2 TABLET ORAL 4 TIMES DAILY
Qty: 120 TABLET | Refills: 0 | Status: SHIPPED | OUTPATIENT
Start: 2017-05-23 | End: 2017-05-24

## 2017-05-11 RX ORDER — OXYCODONE HYDROCHLORIDE 30 MG/1
30 TABLET ORAL 4 TIMES DAILY
Qty: 60 TABLET | Refills: 0 | Status: SHIPPED | OUTPATIENT
Start: 2017-05-18 | End: 2017-05-24

## 2017-05-11 NOTE — TELEPHONE ENCOUNTER
Reason for Call:  Other prescription refill request for oxyCODONE (ROXICODONE) 30 MG IR tablet    PERCOCET  MG per tablet    Detailed comments: Please call patient when script is ready to  at  Clinic.  Patient's mother, Evette Gonzáles will  script.      Patient has new insurance, BCBS and he needs a new Prior Auth for Percocet.  Dr. Cui is aware of this.      Phone Number Patient can be reached at: Home number on file [phone] (home)    Best Time: anytime    Can we leave a detailed message on this number? YES     Thank you,    Call taken on 5/11/2017 at 8:28 AM by Tona Sesay

## 2017-05-12 ENCOUNTER — TELEPHONE (OUTPATIENT)
Dept: FAMILY MEDICINE | Facility: CLINIC | Age: 47
End: 2017-05-12

## 2017-05-12 DIAGNOSIS — K08.89 PAIN, DENTAL: ICD-10-CM

## 2017-05-12 RX ORDER — HYDROMORPHONE HYDROCHLORIDE 4 MG/1
4 TABLET ORAL 4 TIMES DAILY PRN
Qty: 40 TABLET | Refills: 0 | Status: SHIPPED | OUTPATIENT
Start: 2017-05-12 | End: 2017-10-16

## 2017-05-12 NOTE — TELEPHONE ENCOUNTER
Rxs placed at .  Pt informed.  Pt is wondering what he should do while waiting for PA to be completed, he states he will run out of medication.      Jenny BREEN, Patient Care

## 2017-05-12 NOTE — TELEPHONE ENCOUNTER
Reason for Call:  Other prescription    Detailed comments: Pt calling for he would like a call back to go over which medications are covered and which ones are not.    Phone Number Patient can be reached at: Home number on file [phone] (home)    Best Time: Anytime    Can we leave a detailed message on this number? YES    Call taken on 5/12/2017 at 10:28 AM by Ashok Forde

## 2017-05-12 NOTE — TELEPHONE ENCOUNTER
I know his mom was here to  the prescriptions this morning.  I did also print out a prescription for Dilaudid bridge the gap. And I will start working on the Endocet PA

## 2017-05-12 NOTE — TELEPHONE ENCOUNTER
Completed PA form - faxed and placed in scan pile. Informed pt.    Called pt regarding message below -     Pt states the hydromorphone should be covered - pt checked with [employer] cross blue shield. (mother picked up this morning.)    Luciano Berman MA

## 2017-05-18 ENCOUNTER — TELEPHONE (OUTPATIENT)
Dept: FAMILY MEDICINE | Facility: CLINIC | Age: 47
End: 2017-05-18

## 2017-05-18 NOTE — TELEPHONE ENCOUNTER
Reason for Call:  Other prescription    Detailed comments: Jose Luis called to inform you his new insurance is requiring a form called Quantity Limit Form. He believes this is replacing the Prior Auth forms you used to fill out. He also has a couple of other questions for you regarding has pain medicine. He asked to speak with Will originally to discuss what is going on with insurance and medication.    Phone Number Patient can be reached at: Home number on file [phone] (home)    Best Time: Any    Can we leave a detailed message on this number? YES    Call taken on 5/18/2017 at 3:05 PM by Silverio Young

## 2017-05-18 NOTE — TELEPHONE ENCOUNTER
Dania calling from [facility] in [place]. He is a pharmacist filling in at the store today and has some questions before filling patients medication.   Looking at pt's profile he sees 3 diff short acting pain medications. He is wondering why pt has not been prescribed any long acting opioids?  He also says typically after 6 months patients get referred out to pain management. Is there a reason this hasn't happened?     Pharmacist would like this information documented into their system before dispensing any more pain medication.  Please call pharmacist back at [phone].      Mayte Eli CMA

## 2017-05-19 NOTE — TELEPHONE ENCOUNTER
Left detailed message informing pt of message below. Informed pt Will is not in just yet as Well as Dr. Cui but will be in clinic later today.    Ashley Disla MA

## 2017-05-19 NOTE — TELEPHONE ENCOUNTER
Called and spoke with Nely Pharmacist at [facility] and gave her information below.  She expressed understanding and will fill patients RXs.    JANET Ro MA

## 2017-05-19 NOTE — TELEPHONE ENCOUNTER
Jose Luis calling back hoping to get a call from Will today    [phone] is maria luisa and if you can call them to give verbal  To let them know what Jose Luis should be getting as far as pain meds    He will come in clinic and talk this over with you soon    But he still wants a call form you or will  Thank you

## 2017-05-19 NOTE — TELEPHONE ENCOUNTER
1) no need for pain management - we have a structured pain program for the patient  2) long acting not covered by his insurance - I am completely comfortable with his regime - which is simply oxycodone (2 forms to get the proper dosage) and only occasional breakthrough dilaudid.

## 2017-05-23 NOTE — TELEPHONE ENCOUNTER
Called pt -     Pt states percocet won't be covered. ()    Pt states insurance will cover up to six 30 MG tablets daily (roxicodone)    Pt requests phone visit to review - routing to PCP -- can close if ok for phone visit.    Luciano Berman MA

## 2017-05-24 ENCOUNTER — VIRTUAL VISIT (OUTPATIENT)
Dept: FAMILY MEDICINE | Facility: CLINIC | Age: 47
End: 2017-05-24
Payer: COMMERCIAL

## 2017-05-24 DIAGNOSIS — G89.4 CHRONIC PAIN SYNDROME: ICD-10-CM

## 2017-05-24 DIAGNOSIS — F41.9 ANXIETY: ICD-10-CM

## 2017-05-24 DIAGNOSIS — M54.2 NECK PAIN: ICD-10-CM

## 2017-05-24 PROCEDURE — 99441 ZZC PHYSICIAN TELEPHONE EVALUATION 5-10 MIN: CPT | Performed by: FAMILY MEDICINE

## 2017-05-24 RX ORDER — OXYCODONE HYDROCHLORIDE AND ACETAMINOPHEN 10; 325 MG/1; MG/1
2 TABLET ORAL 4 TIMES DAILY
Qty: 120 TABLET | Refills: 0 | Status: SHIPPED | OUTPATIENT
Start: 2017-06-06 | End: 2017-06-06

## 2017-05-24 RX ORDER — CLONAZEPAM 1 MG/1
TABLET ORAL
Qty: 180 TABLET | Refills: 0 | Status: SHIPPED | OUTPATIENT
Start: 2017-05-24 | End: 2017-06-16

## 2017-05-24 RX ORDER — DIAZEPAM 10 MG
10 TABLET ORAL 2 TIMES DAILY PRN
Qty: 60 TABLET | Refills: 0 | Status: SHIPPED | OUTPATIENT
Start: 2017-05-24 | End: 2017-06-29

## 2017-05-24 RX ORDER — OXYCODONE HYDROCHLORIDE 30 MG/1
30 TABLET ORAL 4 TIMES DAILY
Qty: 60 TABLET | Refills: 0 | Status: SHIPPED | OUTPATIENT
Start: 2017-06-01 | End: 2017-06-16

## 2017-05-24 NOTE — MR AVS SNAPSHOT
After Visit Summary   5/24/2017    Jose Luis Christoph Gonzáles    MRN: [id number]           Patient Information     Date Of Birth          1970        Visit Information        Provider Department      5/24/2017 11:20 AM Tonie Cui MD [facility]        Today's Diagnoses     Chronic pain syndrome        Neck pain        Anxiety           Follow-ups after your visit        Follow-up notes from your care team     Return in about 1 month (around 6/24/2017).      Who to contact     If you have questions or need follow up information about today's clinic visit or your schedule please contact [facility] directly at [phone].  Normal or non-critical lab and imaging results will be communicated to you by MyChart, letter or phone within 4 business days after the clinic has received the results. If you do not hear from us within 7 days, please contact the clinic through One[employer] or phone. If you have a critical or abnormal lab result, we will notify you by phone as soon as possible.  Submit refill requests through [employer] or call your pharmacy and they will forward the refill request to us. Please allow 3 business days for your refill to be completed.          Additional Information About Your Visit        MyChart Information     [employer] gives you secure access to your electronic health record. If you see a primary care provider, you can also send messages to your care team and make appointments. If you have questions, please call your primary care clinic.  If you do not have a primary care provider, please call [phone] and they will assist you.        Care EveryWhere ID     This is your Care EveryWhere ID. This could be used by other organizations to access your [place] medical records  [id number]         Blood Pressure from Last 3 Encounters:   03/24/17 136/86   12/12/16 128/78   11/22/16 128/84    Weight from Last 3 Encounters:   03/24/17 77.4 kg (170 lb 11.2  oz)   12/12/16 77.6 kg (171 lb 1.6 oz)   11/22/16 76.2 kg (168 lb 1.6 oz)              Today, you had the following     No orders found for display         Where to get your medicines      Some of these will need a paper prescription and others can be bought over the counter.  Ask your nurse if you have questions.     Bring a paper prescription for each of these medications     clonazePAM 1 MG tablet    diazepam 10 MG tablet    oxyCODONE 30 MG IR tablet    PERCOCET  MG per tablet          Primary Care Provider Office Phone # Fax #    Tonie Zach Cui [phone]       [address] [address]        Thank you!     Thank you for choosing [facility]  for your care. Our goal is always to provide you with excellent care. Hearing back from our patients is one way we can continue to improve our services. Please take a few minutes to complete the written survey that you may receive in the mail after your visit with us. Thank you!             Your Updated Medication List - Protect others around you: Learn how to safely use, store and throw away your medicines at www.disposemymeds.org.          This list is accurate as of: 5/24/17 12:10 PM.  Always use your most recent med list.                   Brand Name Dispense Instructions for use    clonazePAM 1 MG tablet    klonoPIN    180 tablet    TAKE 1-2 TABLETS BY MOUTH THREE TIMES DAILY AS NEEDED FOR ANXIETY       diazepam 10 MG tablet    VALIUM    60 tablet    Take 1 tablet (10 mg) by mouth 2 times daily as needed for sleep       finasteride 5 MG tablet    PROSCAR    30 tablet    Take 1 tablet (5 mg) by mouth daily       HYDROmorphone 4 MG tablet    DILAUDID    40 tablet    Take 1 tablet (4 mg) by mouth 4 times daily as needed for breakthrough pain       omeprazole 20 MG tablet    priLOSEC OTC    90 tablet    Take 1 tablet (20 mg) by mouth daily       oxyCODONE 30 MG IR tablet   Start  taking on:  6/1/2017    ROXICODONE    60 tablet    Take 1 tablet (30 mg) by mouth 4 times daily In combination with percocet.       PERCOCET  MG per tablet   Generic drug:  oxyCODONE-acetaminophen   Start taking on:  6/6/2017     120 tablet    Take 2 tablets by mouth 4 times daily In combination with oxy IR.       polyethylene glycol powder    MIRALAX/GLYCOLAX    527 g    STIR 17 GM OF POWDER (SEE MINGO INSIDE CAP) IN 8-OZ OF LIQUID UNTIL COMPLETELY DISSOLVED. DRINK THE SOLUTION DAILY OR AS DIRECTED.       tretinoin 0.1 % cream    RETIN-A    45 g    Apply topically At Bedtime

## 2017-05-24 NOTE — PROGRESS NOTES
"  SUBJECTIVE:                                                    Jose Luis Gonzáles is a 47 year old male who presents to clinic today for the following health issues:        Chronic Pain Follow-Up       Type / Location of Pain: neck, back  Analgesia/pain control:       Recent changes:  Same --       Overall control: Comfortably manageable  Activity level/function:      Daily activities:  Able to do all daily activities    Work:  not applicable  Adverse effects:  No  Adherance    Taking medication as directed?  pt stating he has to take medications differently because of insurance    Participating in other treatments: no  Risk Factors:    Sleep:  Poor    Mood/anxiety:  controlled    Recent family or social stressors:  Mother is moving to hospitals    Other aggravating factors: none  PHQ-9 SCORE 8/19/2016 9/26/2016 2/28/2017   Total Score - - -   Total Score 22 8 13     ODALYS-7 SCORE 8/19/2016 9/26/2016 2/28/2017   Total Score - - -   Total Score 18 12 20     Encounter-Level CSA - 12/12/2016:                 Controlled Substance Agreement - Scan on 12/13/2016 10:02 AM : CONTROLLED SUBSTANCE AGREEMENT 12/12/16 (below)                 Jose Luis Gonzáles is a 47 year old male who is being evaluated via a telephone visit.      The patient has been notified of following:     \"This telephone visit will be conducted via a call between you and your physician/provider. We have found that certain health care needs can be provided without the need for a physical exam.  This service lets us provide the care you need with a short phone conversation.  If a prescription is necessary we can send it directly to your pharmacy.  If lab work is needed we can place an order for that and you can then stop by our lab to have the test done at a later time.    We will bill your insurance company for this service.  Please check with your medical insurance if this type of visit is covered. You may be responsible for the cost of this type of visit " "if insurance coverage is denied.  The typical cost is $30 (10min), $59 (11-20min) and $85 (21-30min).  Most often these visits are shorter than 10 minutes.    If during the course of the call the physician/provider feels a telephone visit is not appropriate, you will not be charged for this service.\"       Consent has been obtained for this service by 1 care team members: yes. See the scanned image in the medical record.    Jose Luis Christoph Gonzáles complains of  Recheck Medication (pain meds)      I have reviewed and updated the patient's Past Medical History, Social History, Family History and Medication List.    ALLERGIES  Clonidine; Contrast dye; Ibuprofen; Seroquel [quetiapine]; and Valproic acid    Will Berman MA   (MA signature)    Additional provider notes:   Call to follow-up on chronic pain and anxiety. Has had a change in his insurance coverage. We reviewed his current dosing, going over it with the patient and comparing it to the  database. Patient is on schedule. His had some difficulty in getting the Endocet 10 mg prescription covered but I think this has been straightened out. No side effects from medication. Still continues to deal with anxiety on an ongoing basis    ASSESSMENT / PLAN:  (G89.4) Chronic pain syndrome  Comment: Stable. Continue current dosage  Plan: oxyCODONE (ROXICODONE) 30 MG IR tablet,         PERCOCET  MG per tablet, diazepam         (VALIUM) 10 MG tablet            (M54.2) Neck pain  Comment: As above  Plan: oxyCODONE (ROXICODONE) 30 MG IR tablet,         PERCOCET  MG per tablet, diazepam         (VALIUM) 10 MG tablet            (F41.9) Anxiety  Comment: As above  Plan: clonazePAM (KLONOPIN) 1 MG tablet               I have reviewed the note as documented above.  This accurately captures the substance of my conversation with the patient,  JANET Cui M.D.     Total time of call between patient and provider was 7 minutes     "

## 2017-05-30 RX ORDER — DIAZEPAM 10 MG
TABLET ORAL
Qty: 60 TABLET | Refills: 0 | OUTPATIENT
Start: 2017-05-30

## 2017-05-30 RX ORDER — CLONAZEPAM 1 MG/1
TABLET ORAL
Qty: 180 TABLET | Refills: 0 | OUTPATIENT
Start: 2017-05-30

## 2017-06-01 ENCOUNTER — TELEPHONE (OUTPATIENT)
Dept: FAMILY MEDICINE | Facility: CLINIC | Age: 47
End: 2017-06-01

## 2017-06-01 DIAGNOSIS — G89.4 CHRONIC PAIN SYNDROME: ICD-10-CM

## 2017-06-01 DIAGNOSIS — M54.2 NECK PAIN: ICD-10-CM

## 2017-06-01 NOTE — TELEPHONE ENCOUNTER
Reason for Call:  Other prescription    Detailed comments: Pt calling to follow up on his percocet medication that the quantity limit listed on the medication is incorrect and would like to have that changed before his next refill even if that includes requesting a prior auth.    Phone Number Patient can be reached at: Home number on file [phone] (home)    Best Time: Anytime    Can we leave a detailed message on this number? YES    Call taken on 6/1/2017 at 8:55 AM by Ashok Forde

## 2017-06-02 NOTE — TELEPHONE ENCOUNTER
Pt stated that he would like the quantity limitation medication change. Pt takes Percocet 2 tabs 4x daily and quantity of 120 tabs. It is good for 15 days but pharmacy would not let him refill it until 20 days.   Pt also mentioned that if you need to change the lower the dosage of the oxycodone 30 mg to make the Percocet work he would be willing to do that.   Pt said that he had addressed this issue with PCP at an earlier appt.   Insurance told him that a quantity limitation form may need to be filled out.  Can call [phone] provider services with [employer] or go online for the form  Please call pt back to advise.  Thanks.    What is the best number to contact you? Cell [phone]  What time works best to contact you? Anytime. Ok to     Cristy Fernández

## 2017-06-02 NOTE — TELEPHONE ENCOUNTER
This writer attempted to contact Jose Luis on 06/02/17      Reason for call medication and left detailed message.      When patient calls back, please obtain what is incorrect about the percocet quantity.          Ashley Disla CMA

## 2017-06-06 RX ORDER — OXYCODONE HYDROCHLORIDE AND ACETAMINOPHEN 10; 325 MG/1; MG/1
2 TABLET ORAL 4 TIMES DAILY
Qty: 240 TABLET | Refills: 0 | Status: SHIPPED | OUTPATIENT
Start: 2017-06-06 | End: 2017-06-16

## 2017-06-06 NOTE — TELEPHONE ENCOUNTER
Call Jose Luis - still not sure I understand correctly. It looks like he filled #120 over the Percocet tablets on 5/26, so he should be okay to refill these on 6/9.  Is the pharmacy saying he cannot fill it then?

## 2017-06-06 NOTE — TELEPHONE ENCOUNTER
Informed patient -     Placed prescription at . He will follow up if has any issues with filling.    Luciano Berman MA

## 2017-06-06 NOTE — TELEPHONE ENCOUNTER
Lower extremities' do this - I am going to print a new prescription for #240 - a monthly prescription rather than 2 weeks.  This should help us determine whether insurance is capping of this at 6 per day, or whether they will allow 8 tablets per day. We can do a quantity override authorization if needed, or we may need to meet to discuss a change in his dosage if there is a numerical quantity limit

## 2017-06-08 ENCOUNTER — TELEPHONE (OUTPATIENT)
Dept: FAMILY MEDICINE | Facility: CLINIC | Age: 47
End: 2017-06-08

## 2017-06-08 NOTE — TELEPHONE ENCOUNTER
Spoke with [facility] and  The quantity is what is causing the rejection.      Ins [phone]    ID:[id number]    BIN: [id number]     Okay to start PA?      Ashley Disla MA

## 2017-06-08 NOTE — TELEPHONE ENCOUNTER
Patient has new insurance and needs a new PA completed for the Percocet  mg tab/take 2 tabs four times daily. Insurance is [employer]. Please call patient for further information if needed.    What is the best number to contact you? Cell [phone]  What time works best to contact you? Any okay to leave detailed voice message.    Misbah Sandoval

## 2017-06-12 ENCOUNTER — TELEPHONE (OUTPATIENT)
Dept: NURSING | Facility: CLINIC | Age: 47
End: 2017-06-12

## 2017-06-13 ENCOUNTER — MYC MEDICAL ADVICE (OUTPATIENT)
Dept: FAMILY MEDICINE | Facility: CLINIC | Age: 47
End: 2017-06-13

## 2017-06-13 NOTE — TELEPHONE ENCOUNTER
Please see if can work into Dr. Cui schedule on Friday-according to message should not need refill until then

## 2017-06-13 NOTE — TELEPHONE ENCOUNTER
Pt checking back   He doesn't have [employer] message access now  He said the issue now is that it needs to say urgent  On the INDOCET    Re: roxycodone script  He will be in on June 16 to get hard copies  He does want to see you to discuss weaning off some of the pain meds  If he can be fit into schedule for Friday that will work   Even a phone visit    Call to advise   [phone] (H) ok to leave details on message

## 2017-06-13 NOTE — TELEPHONE ENCOUNTER
Clinic Action Needed:Yes Please call patient   Reason for Call:Patient calling requesting to schedule telephone appointment with Dr Cui to discuss Prior Authorizations.   Routed to:Dr Cui Nurse Brandon Valencia, RN  [facility]

## 2017-06-13 NOTE — TELEPHONE ENCOUNTER
Called pt - scheduled for 6/16/17 -     Resent PA for URGENT -- on cover my meds. - Champagne: [id number]      Luciano Berman MA

## 2017-06-13 NOTE — TELEPHONE ENCOUNTER
Spoke with pt, advised Dr. Cui next opening is 6/20/17, pt decided he does not want an appt, just wants to get prior auth done.  Informed pt that prior auth is in progress, pt states prior auth, was not filled out correctly and needsto be marked urgent.    Jenny BREEN, Patient Care

## 2017-06-16 DIAGNOSIS — G89.4 CHRONIC PAIN SYNDROME: ICD-10-CM

## 2017-06-16 DIAGNOSIS — F41.9 ANXIETY: ICD-10-CM

## 2017-06-16 DIAGNOSIS — M54.2 NECK PAIN: ICD-10-CM

## 2017-06-16 RX ORDER — OXYCODONE HYDROCHLORIDE 30 MG/1
30 TABLET ORAL 4 TIMES DAILY
Qty: 60 TABLET | Refills: 0 | Status: SHIPPED | OUTPATIENT
Start: 2017-06-16 | End: 2017-06-26

## 2017-06-16 RX ORDER — OXYCODONE HYDROCHLORIDE AND ACETAMINOPHEN 10; 325 MG/1; MG/1
2 TABLET ORAL 4 TIMES DAILY
Qty: 240 TABLET | Refills: 0 | Status: SHIPPED | OUTPATIENT
Start: 2017-06-16 | End: 2017-06-19

## 2017-06-16 RX ORDER — CLONAZEPAM 1 MG/1
TABLET ORAL
Qty: 180 TABLET | Refills: 0 | Status: SHIPPED | OUTPATIENT
Start: 2017-06-16 | End: 2017-07-07

## 2017-06-16 NOTE — TELEPHONE ENCOUNTER
Received PA approval - faxed to pharmacy. Pt informed.    Placed in scan pile.    Luciano Berman MA

## 2017-06-19 ENCOUNTER — TELEPHONE (OUTPATIENT)
Dept: FAMILY MEDICINE | Facility: CLINIC | Age: 47
End: 2017-06-19

## 2017-06-19 DIAGNOSIS — G89.4 CHRONIC PAIN SYNDROME: ICD-10-CM

## 2017-06-19 DIAGNOSIS — M54.2 NECK PAIN: ICD-10-CM

## 2017-06-19 RX ORDER — OXYCODONE HYDROCHLORIDE AND ACETAMINOPHEN 10; 325 MG/1; MG/1
2 TABLET ORAL 4 TIMES DAILY
Qty: 240 TABLET | Refills: 0 | Status: SHIPPED | OUTPATIENT
Start: 2017-06-19 | End: 2017-06-26

## 2017-06-19 NOTE — TELEPHONE ENCOUNTER
PERCOCET  MG per tablet      Last Written Prescription Date:  6/16/17  Last Fill Quantity: 240,   # refills: 0  Last Office Visit with [facility], [facility] or [facility] prescribing provider: 1/6/17  Future Office visit:    Next 5 appointments (look out 90 days)     Jun 27, 2017 11:00 AM CDT   [facility]    [address] [place] [address] [id number] [phone]                   Routing refill request to provider for review/approval because:  Drug not on the [facility], [facility] or [facility] refill protocol or controlled substance

## 2017-06-19 NOTE — TELEPHONE ENCOUNTER
..Reason for Call:   endocet    Detailed comments: request for qty 0f 240, they are only able to dispense 180; also pt takes 8 per day, max allowed per insurance  Is 6; prior auth is needed. Is faxing over information. Thank you    Phone Number Patient can be reached at:  phone number:  [phone]    Best Time: any    Can we leave a detailed message on this number? Not Applicable    Call taken on 6/19/2017 at 9:44 AM by Clara Krueger

## 2017-06-19 NOTE — TELEPHONE ENCOUNTER
Called pharmacy -     Informed them we received PA approval on 6/16/17 - this information was faxed to them on that date.    Luciano Berman MA

## 2017-06-26 RX ORDER — OXYCODONE HYDROCHLORIDE 30 MG/1
30 TABLET ORAL 4 TIMES DAILY
Qty: 60 TABLET | Refills: 0 | Status: SHIPPED | OUTPATIENT
Start: 2017-06-26 | End: 2017-07-07

## 2017-06-26 RX ORDER — OXYCODONE HYDROCHLORIDE AND ACETAMINOPHEN 10; 325 MG/1; MG/1
2 TABLET ORAL 4 TIMES DAILY
Qty: 60 TABLET | Refills: 0 | Status: SHIPPED | OUTPATIENT
Start: 2017-06-26 | End: 2017-07-07

## 2017-06-26 NOTE — TELEPHONE ENCOUNTER
Reason for Call:  Other prescription    Detailed comments: only 180 pills filled needs 60 more now that the prior auth has been approved can you send again or call me  So needs another script for 60 more pills please.   Oxycodone 30 mg  IR 60 tabls or 120 tabs needs also.  Can you please call patient and verify this order he said it in several different ways and it was really confusing.     Phone Number Patient can be reached at: [phone]      Best Time: any    Can we leave a detailed message on this number? YES    Call taken on 6/26/2017 at 1:52 PM by Phoebe Corley

## 2017-06-27 ENCOUNTER — NURSE TRIAGE (OUTPATIENT)
Dept: NURSING | Facility: CLINIC | Age: 47
End: 2017-06-27

## 2017-06-27 ENCOUNTER — TELEPHONE (OUTPATIENT)
Dept: FAMILY MEDICINE | Facility: CLINIC | Age: 47
End: 2017-06-27

## 2017-06-27 NOTE — TELEPHONE ENCOUNTER
This writer attempted to contact Jose Luis on 06/27/17      Reason for call cyst and left message to return call.      When patient calls back, please contact clinic RN team. If no one available, document that pt called and route to care team.        Jessica Hawk RN

## 2017-06-27 NOTE — TELEPHONE ENCOUNTER
Reason for Call:  Other returning call    Detailed comments: Jose Luis returning call to clinic regarding a Cyst.  Please try him again at phone number listed    Phone Number Patient can be reached at: Home number on file [phone] (home)    Best Time: Any    Can we leave a detailed message on this number? YES    Call taken on 6/27/2017 at 4:46 PM by Silverio Young

## 2017-06-27 NOTE — TELEPHONE ENCOUNTER
Spoke with Dr. Cui as pt has a Rx at  for qty 240 from 6/19/17.  This Rx has been shredded and replaced with the 2 new ones written 6/26/17.    Jenny BREEN, Patient Care

## 2017-06-27 NOTE — TELEPHONE ENCOUNTER
Reason for call:  Patient reporting a symptom    Symptom or request: Return of cyst on forehead. Asking for a message to be put in, because he had this procedure done about 9/2015 and needed a procedure room to do this in.    Duration (how long have symptoms been present):     Have you been treated for this before? Yes    Additional comments:     Phone Number patient can be reached at:  Home number on file [phone] (home)    Best Time:  any    Can we leave a detailed message on this number:  YES    Call taken on 6/27/2017 at 12:42 PM by Lili Del Angel

## 2017-06-28 ENCOUNTER — TELEPHONE (OUTPATIENT)
Dept: FAMILY MEDICINE | Facility: CLINIC | Age: 47
End: 2017-06-28

## 2017-06-28 NOTE — TELEPHONE ENCOUNTER
Reason for Call:  Other prescription    Detailed comments: please call me if RX for 2 narcotics are ready for pickup     Phone Number Patient can be reached at: Home number on file [phone] (home)  Sending high priority message looks like it should be there waiting    Best Time: any    Can we leave a detailed message on this number? YES    Call taken on 6/28/2017 at 12:55 PM by Phoebe Corley

## 2017-06-28 NOTE — TELEPHONE ENCOUNTER
Chart reviewed, pt had a lipoma removal in clinic on 9/25/15 by Dr. Cui.    TC: Please call patient to assist with scheduling OV for this concern.

## 2017-06-28 NOTE — TELEPHONE ENCOUNTER
Patient called to check to see if his Oxycodone 30mg and Percocet were ready to be picked up.  Per EPIC chart the medication scripts were printed, but unsure if they are ready for patient to pick them up.  Advised patient to call the clinic in the morning.  He will do so.    Marianne Huggins RN  [facility]  [phone]

## 2017-06-28 NOTE — TELEPHONE ENCOUNTER
This writer attempted to contact Jose Luis on 06/28/17      Reason for call schedule appt and left message to return call.      When patient calls back, please schedule Office Visit appointment anytime 40 minutes with PCP, document that pt called and close encounter .          Jenny Ambrose MA

## 2017-06-29 DIAGNOSIS — G89.4 CHRONIC PAIN SYNDROME: ICD-10-CM

## 2017-06-29 DIAGNOSIS — M54.2 NECK PAIN: ICD-10-CM

## 2017-06-29 RX ORDER — DIAZEPAM 10 MG
TABLET ORAL
Qty: 60 TABLET | Refills: 0 | Status: SHIPPED | OUTPATIENT
Start: 2017-06-29 | End: 2017-07-07

## 2017-06-29 NOTE — TELEPHONE ENCOUNTER
diazepam (VALIUM) 10 MG tablet      Last Written Prescription Date:  5/24/17  Last Fill Quantity: 60,   # refills: 0  Last Office Visit with [facility], [facility] or [facility] prescribing provider: 5/24/17  Future Office visit:    Next 5 appointments (look out 90 days)     Jul 07, 2017  4:00 PM CDT   Office Visit with Tonie Cui MD   [facility]    [address] [place] [address] [phone]                   Routing refill request to provider for review/approval because:  Drug not on the [facility], [facility] or [facility] refill protocol or controlled substance

## 2017-07-07 ENCOUNTER — VIRTUAL VISIT (OUTPATIENT)
Dept: FAMILY MEDICINE | Facility: CLINIC | Age: 47
End: 2017-07-07
Payer: COMMERCIAL

## 2017-07-07 DIAGNOSIS — F41.9 ANXIETY: ICD-10-CM

## 2017-07-07 DIAGNOSIS — G89.4 CHRONIC PAIN SYNDROME: ICD-10-CM

## 2017-07-07 DIAGNOSIS — M54.2 NECK PAIN: ICD-10-CM

## 2017-07-07 PROCEDURE — 99441 ZZC PHYSICIAN TELEPHONE EVALUATION 5-10 MIN: CPT | Performed by: FAMILY MEDICINE

## 2017-07-07 RX ORDER — OXYCODONE HYDROCHLORIDE 30 MG/1
30 TABLET ORAL 4 TIMES DAILY
Qty: 120 TABLET | Refills: 0 | Status: SHIPPED | OUTPATIENT
Start: 2017-07-14 | End: 2017-07-21

## 2017-07-07 RX ORDER — OXYCODONE HYDROCHLORIDE AND ACETAMINOPHEN 10; 325 MG/1; MG/1
2 TABLET ORAL 4 TIMES DAILY
Qty: 60 TABLET | Refills: 0 | Status: SHIPPED | OUTPATIENT
Start: 2017-07-11 | End: 2017-08-11

## 2017-07-07 RX ORDER — CLONAZEPAM 1 MG/1
TABLET ORAL
Qty: 180 TABLET | Refills: 0 | Status: SHIPPED | OUTPATIENT
Start: 2017-07-07 | End: 2017-08-11

## 2017-07-07 RX ORDER — DIAZEPAM 10 MG
TABLET ORAL
Qty: 60 TABLET | Refills: 0 | Status: SHIPPED | OUTPATIENT
Start: 2017-07-28 | End: 2017-08-15

## 2017-07-07 NOTE — PROGRESS NOTES
"  SUBJECTIVE:                                                    Jose Luis Gonzáles is a 47 year old male who presents to clinic today for the following health issues:      Chief Complaint   Patient presents with     Derm Problem     welts(full body), fever, fatigue -- pt states Endocet  was changed      Derm Problem     skin concerns - welt on hand, cyst on scalp   - recommend Dermatology, appointment here?    Initial There were no vitals taken for this visit. Estimated body mass index is 24.01 kg/(m^2) as calculated from the following:    Height as of 3/24/17: 1.796 m (5' 10.7\").    Weight as of 3/24/17: 77.4 kg (170 lb 11.2 oz).  Medication Reconciliation: complete    Jose Luis Gonzáles is a 47 year old male who is being evaluated via a telephone visit.      The patient has been notified of following:     \"This telephone visit will be conducted via a call between you and your physician/provider. We have found that certain health care needs can be provided without the need for a physical exam.  This service lets us provide the care you need with a short phone conversation.  If a prescription is necessary we can send it directly to your pharmacy.  If lab work is needed we can place an order for that and you can then stop by our lab to have the test done at a later time.    We will bill your insurance company for this service.  Please check with your medical insurance if this type of visit is covered. You may be responsible for the cost of this type of visit if insurance coverage is denied.  The typical cost is $30 (10min), $59 (11-20min) and $85 (21-30min).  Most often these visits are shorter than 10 minutes.    If during the course of the call the physician/provider feels a telephone visit is not appropriate, you will not be charged for this service.\"       Consent has been obtained for this service by 1 care team members: yes. See the scanned image in the medical record.    Jose Luis Gonzáles " complains of  Derm Problem (welts(full body), fever, fatigue -- pt states Endocet  was changed ) and Derm Problem (skin concerns - welt on hand, cyst on scalp)      I have reviewed and updated the patient's Past Medical History, Social History, Family History and Medication List.    ALLERGIES  Clonidine; Contrast dye; Ibuprofen; Seroquel [quetiapine]; and Valproic acid    Will Berman MA   (MA signature)    Additional provider notes:   Phone visit regarding chronic pain meds.  Has had some issues with refills - specifically trying to get branded endocet - change of , etc.  Discussed with patient that we really have no control over that.  Need to keep on schedule - he can research other pharmacies as needed.  Reviewed prescriptions here and  database     ASSESSMENT / PLAN:  (G89.4) Chronic pain syndrome  Comment: stable - refilled on schedule   Plan: oxyCODONE (ROXICODONE) 30 MG IR tablet,         PERCOCET  MG per tablet, diazepam         (VALIUM) 10 MG tablet            (M54.2) Neck pain  Comment:   Plan: oxyCODONE (ROXICODONE) 30 MG IR tablet,         PERCOCET  MG per tablet, diazepam         (VALIUM) 10 MG tablet            (F41.9) Anxiety  Comment:   Plan: clonazePAM (KLONOPIN) 1 MG tablet        As above        I have reviewed the note as documented above.  This accurately captures the substance of my conversation with the patient,  JANET Cui M.D.     Total time of call between patient and provider was 6 minutes

## 2017-07-07 NOTE — MR AVS SNAPSHOT
After Visit Summary   7/7/2017    Jose Luis Christoph Gonzáles    MRN: [id number]           Patient Information     Date Of Birth          1970        Visit Information        Provider Department      7/7/2017 4:00 PM Tonie Cui MD [facility]        Today's Diagnoses     Chronic pain syndrome        Neck pain        Anxiety           Follow-ups after your visit        Follow-up notes from your care team     Return in about 1 month (around 8/7/2017).      Who to contact     If you have questions or need follow up information about today's clinic visit or your schedule please contact [facility] directly at [phone].  Normal or non-critical lab and imaging results will be communicated to you by MyChart, letter or phone within 4 business days after the clinic has received the results. If you do not hear from us within 7 days, please contact the clinic through [employer] or phone. If you have a critical or abnormal lab result, we will notify you by phone as soon as possible.  Submit refill requests through [employer] or call your pharmacy and they will forward the refill request to us. Please allow 3 business days for your refill to be completed.          Additional Information About Your Visit        MyChart Information     [employer] gives you secure access to your electronic health record. If you see a primary care provider, you can also send messages to your care team and make appointments. If you have questions, please call your primary care clinic.  If you do not have a primary care provider, please call [phone] and they will assist you.        Care EveryWhere ID     This is your Care EveryWhere ID. This could be used by other organizations to access your [facility] medical records  [id number]         Blood Pressure from Last 3 Encounters:   03/24/17 136/86   12/12/16 128/78   11/22/16 128/84    Weight from Last 3 Encounters:   03/24/17 77.4 kg (170 lb 11.2 oz)    12/12/16 77.6 kg (171 lb 1.6 oz)   11/22/16 76.2 kg (168 lb 1.6 oz)              Today, you had the following     No orders found for display         Today's Medication Changes          These changes are accurate as of: 7/7/17 11:59 PM.  If you have any questions, ask your nurse or doctor.               These medicines have changed or have updated prescriptions.        Dose/Directions    clonazePAM 1 MG tablet   Commonly known as:  klonoPIN   This may have changed:    - when to take this  - additional instructions   Used for:  Anxiety        TAKE 1-2 TABLETS BY MOUTH THREE TIMES DAILY AS NEEDED FOR ANXIETY   Quantity:  180 tablet   Refills:  0       diazepam 10 MG tablet   Commonly known as:  VALIUM   This may have changed:  See the new instructions.   Used for:  Chronic pain syndrome, Neck pain   Changed by:  Tonie Cui MD        Start taking on:  7/28/2017   TAKE 1 TABLET BY MOUTH TWICE DAILY AS NEEDED FOR SLEEP   Quantity:  60 tablet   Refills:  0            Where to get your medicines      Some of these will need a paper prescription and others can be bought over the counter.  Ask your nurse if you have questions.     Bring a paper prescription for each of these medications     clonazePAM 1 MG tablet    diazepam 10 MG tablet    oxyCODONE 30 MG IR tablet    PERCOCET  MG per tablet                Primary Care Provider Office Phone # Fax #    Tonie Cui [phone]       [place] [id number]        Equal Access to Services     [facility] AH: Hadii bishop ku hadasho Socalli, waaxda luqadaha, qaybta kaalmada adeegyada, jose angel nichole haywellington kilgore . So [facility] [phone].    ATENCIÓN: Si habla español, tiene a ceron disposición servicios gratuitos de asistencia lingüística. Llame al [phone].    We comply with applicable federal civil rights laws and [place] laws. We do not discriminate on the basis of  race, color, national origin, age, disability sex, sexual orientation or gender identity.            Thank you!     Thank you for choosing [facility]  for your care. Our goal is always to provide you with excellent care. Hearing back from our patients is one way we can continue to improve our services. Please take a few minutes to complete the written survey that you may receive in the mail after your visit with us. Thank you!             Your Updated Medication List - Protect others around you: Learn how to safely use, store and throw away your medicines at www.disposemymeds.org.          This list is accurate as of: 7/7/17 11:59 PM.  Always use your most recent med list.                   Brand Name Dispense Instructions for use Diagnosis    clonazePAM 1 MG tablet    klonoPIN    180 tablet    TAKE 1-2 TABLETS BY MOUTH THREE TIMES DAILY AS NEEDED FOR ANXIETY    Anxiety       diazepam 10 MG tablet   Start taking on:  7/28/2017    VALIUM    60 tablet    TAKE 1 TABLET BY MOUTH TWICE DAILY AS NEEDED FOR SLEEP    Chronic pain syndrome, Neck pain       finasteride 5 MG tablet    PROSCAR    30 tablet    Take 1 tablet (5 mg) by mouth daily    Male pattern baldness       HYDROmorphone 4 MG tablet    DILAUDID    40 tablet    Take 1 tablet (4 mg) by mouth 4 times daily as needed for breakthrough pain    Pain, dental       omeprazole 20 MG tablet    priLOSEC OTC    90 tablet    Take 1 tablet (20 mg) by mouth daily    Gastroesophageal reflux disease without esophagitis       oxyCODONE 30 MG IR tablet   Start taking on:  7/14/2017    ROXICODONE    120 tablet    Take 1 tablet (30 mg) by mouth 4 times daily In combination with percocet.    Chronic pain syndrome, Neck pain       PERCOCET  MG per tablet   Generic drug:  oxyCODONE-acetaminophen   Start taking on:  7/11/2017     60 tablet    Take 2 tablets by mouth 4 times daily In combination with oxy IR.    Chronic pain syndrome, Neck pain       polyethylene  glycol powder    MIRALAX/GLYCOLAX    527 g    STIR 17 GM OF POWDER (SEE MINGO INSIDE CAP) IN 8-OZ OF LIQUID UNTIL COMPLETELY DISSOLVED. DRINK THE SOLUTION DAILY OR AS DIRECTED.    Chronic constipation       tretinoin 0.1 % cream    RETIN-A    45 g    Apply topically At Bedtime    Acne vulgaris

## 2017-07-10 ENCOUNTER — TELEPHONE (OUTPATIENT)
Dept: FAMILY MEDICINE | Facility: CLINIC | Age: 47
End: 2017-07-10

## 2017-07-12 ENCOUNTER — TELEPHONE (OUTPATIENT)
Dept: FAMILY MEDICINE | Facility: CLINIC | Age: 47
End: 2017-07-12

## 2017-07-12 ENCOUNTER — NURSE TRIAGE (OUTPATIENT)
Dept: NURSING | Facility: CLINIC | Age: 47
End: 2017-07-12

## 2017-07-12 DIAGNOSIS — G89.4 CHRONIC PAIN SYNDROME: ICD-10-CM

## 2017-07-12 DIAGNOSIS — M54.2 NECK PAIN: ICD-10-CM

## 2017-07-12 NOTE — TELEPHONE ENCOUNTER
Patient calling to confirm that the following prescriptions are that the .    PERCOCET  MG per tablet 60 tablet 0 7/11/2017  Yes   Sig: Take 2 tablets by mouth 4 times daily In combination with oxy IR.   Class: Local Print   Notes to Pharmacy: Endocet Brand   Route: Oral   Order: [id number]     oxyCODONE (ROXICODONE) 30 MG IR tablet 120 tablet 0 7/14/2017  No   Sig: Take 1 tablet (30 mg) by mouth 4 times daily In combination with percocet.   Class: Local Print   Route: Oral   Order: [id number]         TC- Please verify with patient if prescriptions are ready to .    Akilah Rogers RN

## 2017-07-12 NOTE — TELEPHONE ENCOUNTER
"  Reason for Disposition    Hives or itching    Additional Information    Negative: [1] Life-threatening reaction (anaphylaxis) in the past to the same drug AND [2] < 2 hours since exposure    Negative: Difficulty breathing or wheezing    Negative: [1] Hoarseness or cough AND [2] started soon after 1st dose of drug    Negative: [1] Swollen tongue AND [2] started soon after 1st dose of drug    Negative: [1] Purple or blood-colored rash (spots or dots) AND [2] fever    Negative: Sounds like a life-threatening emergency to the triager    Negative: Rash is only on 1 part of the body (localized)    Negative: Taking new non-prescription (OTC) antihistamine, decongestant, ear drops, eye drops, or other OTC cough/cold medicine    Negative: Taking new prescription antihistamine, allergy medicine, asthma medicine, eye drops, ear drops or nose drops    Negative: Rash started more than 3 days after stopping new prescription medicine    Negative: Swollen tongue    Negative: [1] Widespread hives AND [2] onset < 2 hours of exposure to 1st dose of drug    Negative: Fever    Negative: Patient sounds very sick or weak to the triager    Negative: [1] Purple or blood-colored rash (spots or dots) AND [2] no fever AND [3] sounds well to triager    Negative: [1] Taking new prescription medication AND [2] rash within 4 hours of 1st dose    Negative: Large or small blisters on skin (i.e., fluid filled bubbles or sacs)    Negative: Bloody crusts on lips or sores in mouth    Negative: Face or lip swelling    Protocols used: RASH - WIDESPREAD ON DRUGS-ADULT-AH  \"I am calling to find out if the doctor signed my two prescriptions and if they are at the ? The second thing is that I have hives from a medication that was substituted by the Pharmacy. The pharmacist is looking for a different . I have red, raised, itchy rash on my chest and back. I put calamine lotion on the front of my chest and it helped. I have pale itchy " "bumps on my back that did not respond to the calamine lotion. Can I take Benadryl?\"  Evette Gr RN  [facility]    "

## 2017-07-14 RX ORDER — OXYCODONE HYDROCHLORIDE AND ACETAMINOPHEN 10; 325 MG/1; MG/1
2 TABLET ORAL 4 TIMES DAILY
Qty: 60 TABLET | Refills: 0 | OUTPATIENT
Start: 2017-07-14

## 2017-07-14 RX ORDER — OXYCODONE HYDROCHLORIDE 30 MG/1
30 TABLET ORAL 4 TIMES DAILY
Qty: 120 TABLET | Refills: 0 | OUTPATIENT
Start: 2017-07-14

## 2017-07-21 ENCOUNTER — TELEPHONE (OUTPATIENT)
Dept: FAMILY MEDICINE | Facility: CLINIC | Age: 47
End: 2017-07-21

## 2017-07-21 DIAGNOSIS — G89.4 CHRONIC PAIN SYNDROME: ICD-10-CM

## 2017-07-21 DIAGNOSIS — M54.2 NECK PAIN: ICD-10-CM

## 2017-07-21 NOTE — TELEPHONE ENCOUNTER
Reason for Call:  Other prescription    Detailed comments: can only fill 60 of 120 tabs of the roxicodone tabs needs another rx please call back and advise   (can be filled in 15 mg tabs instead of 30 mg tabs - harder to find), also wants extended appt for removing cyst on head    Phone Number Patient can be reached at: Cell number on file:    Telephone Information:   Mobile [phone]       Best Time: any    Can we leave a detailed message on this number? YES    Call taken on 7/21/2017 at 4:18 PM by Phoebe Corley

## 2017-07-24 ENCOUNTER — TELEPHONE (OUTPATIENT)
Dept: FAMILY MEDICINE | Facility: CLINIC | Age: 47
End: 2017-07-24

## 2017-07-24 RX ORDER — OXYCODONE HYDROCHLORIDE 30 MG/1
30 TABLET ORAL 4 TIMES DAILY
Qty: 120 TABLET | Refills: 0 | Status: SHIPPED | OUTPATIENT
Start: 2017-07-24 | End: 2017-07-26

## 2017-07-24 NOTE — TELEPHONE ENCOUNTER
Reason for Call:  Other     Detailed comments: Pt states he had already had a phone visit to discuss taking out a cyst that Dr Cui had done before but it has grown back and wants to know if he can do this or not? Phone visit was on 7/7/2017  Phone Number Patient can be reached at: Home number on file [phone] (home)    Best Time: any    Can we leave a detailed message on this number? YES    Call taken on 7/24/2017 at 10:13 AM by Lili Del Angel

## 2017-07-24 NOTE — TELEPHONE ENCOUNTER
Please advise.  I do believe I had already scheduled pt for this, but dont see he ever came in.      Jenny BREEN, Patient Care

## 2017-07-26 ENCOUNTER — TELEPHONE (OUTPATIENT)
Dept: FAMILY MEDICINE | Facility: CLINIC | Age: 47
End: 2017-07-26

## 2017-07-26 DIAGNOSIS — M54.2 NECK PAIN: ICD-10-CM

## 2017-07-26 DIAGNOSIS — G89.4 CHRONIC PAIN SYNDROME: ICD-10-CM

## 2017-07-26 RX ORDER — OXYCODONE HYDROCHLORIDE 10 MG/1
10-20 TABLET ORAL 3 TIMES DAILY PRN
Qty: 60 TABLET | Refills: 0 | Status: SHIPPED | OUTPATIENT
Start: 2017-07-26 | End: 2018-01-24

## 2017-07-26 RX ORDER — OXYCODONE HYDROCHLORIDE 30 MG/1
30 TABLET ORAL 4 TIMES DAILY
Qty: 60 TABLET | Refills: 0 | Status: SHIPPED | OUTPATIENT
Start: 2017-07-28 | End: 2017-08-11

## 2017-07-26 NOTE — TELEPHONE ENCOUNTER
Call to patient and would like 30 mg tab and fill 60. He was also requesting something for breakthrough pain until his surgery on head/teeth.   Patient has 2 RX's for Endocet that pharmacy wouldn't fill due to an insurance issue. Patient will bring them back to clinic be destroyed.  Routing to provider to review and advise.  Akilah Betancourt RN.

## 2017-07-26 NOTE — TELEPHONE ENCOUNTER
Reason for Call:  Other      Detailed comments: wants change in Rx that is written and sitting at     Sending high priority message    Phone Number Patient can be reached at: Cell number on file:    Telephone Information:   Mobile [phone]       Best Time: any    Can we leave a detailed message on this number? YES    Call taken on 7/26/2017 at 2:41 PM by Phoebe Corley

## 2017-07-26 NOTE — TELEPHONE ENCOUNTER
I can certainly make the change, but his last message was unclear. Does he want to continue with the 30 mg tablets but only gets 60 at a time?  Or is he wanting to change over to 15 mg tablets?

## 2017-07-28 ENCOUNTER — TELEPHONE (OUTPATIENT)
Dept: FAMILY MEDICINE | Facility: CLINIC | Age: 47
End: 2017-07-28

## 2017-07-28 NOTE — TELEPHONE ENCOUNTER
..Reason for Call:  Other prescription    Detailed comments: Pt called said the pharmacy would not let him fill his two scripts.  On the 9th he filled a break-through medication he was prescribed before surgery so that's the reason, he said also for the medication the pharmacy is requesting a prior auth. Pt would like a phone appointment to discuss with Dr. Castaneda    Phone Number Patient can be reached at:  [phone] (home)    Best Time: anytime    Can we leave a detailed message on this number? YES    Call taken on 7/28/2017 at 9:01 AM by Milo Dickinson

## 2017-07-28 NOTE — TELEPHONE ENCOUNTER
Called patient - pt says he doesn't need phone visit currently -     Pt will follow up with pharmacy - pt states didn't get full prescription at pharmacy (only had 60 tabs left)    Will Cullen JOHNSON

## 2017-07-28 NOTE — TELEPHONE ENCOUNTER
I believe the request is to have a phone visit with Dr. Cui- I do not see Dr. Castaneda listed in patient's care. Basilia is full today.     Routing to provider to review and advise.   Janette Marrufo RN

## 2017-08-09 ENCOUNTER — TELEPHONE (OUTPATIENT)
Dept: FAMILY MEDICINE | Facility: CLINIC | Age: 47
End: 2017-08-09

## 2017-08-09 NOTE — TELEPHONE ENCOUNTER
ULISES  Pt cancelled appt for today, is currently at an ER (did not give hospital name)  Will be calling back to reschedule, apologizes.  Thank you,  TUAN Krueger

## 2017-08-11 ENCOUNTER — TELEPHONE (OUTPATIENT)
Dept: FAMILY MEDICINE | Facility: CLINIC | Age: 47
End: 2017-08-11

## 2017-08-11 DIAGNOSIS — F41.9 ANXIETY: ICD-10-CM

## 2017-08-11 DIAGNOSIS — G89.4 CHRONIC PAIN SYNDROME: ICD-10-CM

## 2017-08-11 DIAGNOSIS — M54.2 NECK PAIN: ICD-10-CM

## 2017-08-11 DIAGNOSIS — K59.09 CHRONIC CONSTIPATION: ICD-10-CM

## 2017-08-11 RX ORDER — OXYCODONE HYDROCHLORIDE AND ACETAMINOPHEN 10; 325 MG/1; MG/1
2 TABLET ORAL 4 TIMES DAILY
Qty: 120 TABLET | Refills: 0 | Status: SHIPPED | OUTPATIENT
Start: 2017-08-11 | End: 2017-08-15

## 2017-08-11 RX ORDER — OXYCODONE HYDROCHLORIDE 30 MG/1
30 TABLET ORAL 4 TIMES DAILY
Qty: 60 TABLET | Refills: 0 | Status: SHIPPED | OUTPATIENT
Start: 2017-08-11 | End: 2017-08-15

## 2017-08-11 RX ORDER — POLYETHYLENE GLYCOL 3350 17 G/17G
POWDER, FOR SOLUTION ORAL
Qty: 527 G | Refills: 10 | Status: SHIPPED | OUTPATIENT
Start: 2017-08-11 | End: 2018-02-26

## 2017-08-11 RX ORDER — OXYCODONE HYDROCHLORIDE 10 MG/1
10-20 TABLET ORAL 3 TIMES DAILY PRN
Qty: 60 TABLET | Refills: 0 | Status: CANCELLED | OUTPATIENT
Start: 2017-08-11

## 2017-08-11 RX ORDER — CLONAZEPAM 1 MG/1
TABLET ORAL
Qty: 180 TABLET | Refills: 0 | Status: SHIPPED | OUTPATIENT
Start: 2017-08-11 | End: 2017-09-18

## 2017-08-11 NOTE — TELEPHONE ENCOUNTER
Patient released from hospital today. Please see earlier telephone note for today.Patient asking on status of RX's from earlier today. Has an appointment to follow up with you on Tuesday 8/185/17.

## 2017-08-11 NOTE — TELEPHONE ENCOUNTER
Call to patient per request- he is in the hospital with gallstones and infected root canal. When he gets out of the hospital he is going to get tooth pulled out that root canal was done on. Patient requesting refills on medications and would like Mom to  narcotic RX's as [place] and patient doesn't know when he will get back up here.   Routing to provider to review and advise.  Akilah Betancourt RN.     Routing refill request to provider for review/approval because:  Drug not on the [facility] refill protocol   Akilah Betancourt RN.     Prescription approved per [facility] Refill Protocol.  Akilah Betancourt RN.

## 2017-08-11 NOTE — TELEPHONE ENCOUNTER
Reason for Call:  Other returning call    Detailed comments: patient called in to check status of refill request. Please follow up with patient regarding this as soon as you hear something from provider. Thanks.     Phone Number Patient can be reached at: Cell number on file:    Telephone Information:   Mobile [phone]       Best Time: anytime     Can we leave a detailed message on this number? Yes    Call taken on 8/11/2017 at 1:39 PM by Harvey Gordillo

## 2017-08-11 NOTE — TELEPHONE ENCOUNTER
..Reason for Call:  Medication or medication refill:    Do you use a [facility]?  Name of the pharmacy and phone number for the current request:  [facility] and some scripts will be picked up    Name of the medication requested: polyethylene glycol(miralax) powder, clonazepam 1 mg, oxycodone(roxicodone) 30 mg, percocet  mg  Other request: tried to schedule a phone visit, ,but there are no openings; is in the hospital currently with gallstones and other condition, please call    Can we leave a detailed message on this number? YES    Phone number patient can be reached at: Home number on file [phone] (home)    Best Time: anytime    Call taken on 8/11/2017 at 7:17 AM by Clara Krueger

## 2017-08-15 ENCOUNTER — OFFICE VISIT (OUTPATIENT)
Dept: FAMILY MEDICINE | Facility: CLINIC | Age: 47
End: 2017-08-15
Payer: COMMERCIAL

## 2017-08-15 ENCOUNTER — TELEPHONE (OUTPATIENT)
Dept: FAMILY MEDICINE | Facility: CLINIC | Age: 47
End: 2017-08-15

## 2017-08-15 VITALS
TEMPERATURE: 98 F | WEIGHT: 167.9 LBS | SYSTOLIC BLOOD PRESSURE: 126 MMHG | OXYGEN SATURATION: 97 % | HEART RATE: 104 BPM | BODY MASS INDEX: 23.51 KG/M2 | RESPIRATION RATE: 16 BRPM | DIASTOLIC BLOOD PRESSURE: 75 MMHG | HEIGHT: 71 IN

## 2017-08-15 DIAGNOSIS — M54.2 NECK PAIN: ICD-10-CM

## 2017-08-15 DIAGNOSIS — L03.211 FACIAL CELLULITIS: ICD-10-CM

## 2017-08-15 DIAGNOSIS — G89.4 CHRONIC PAIN SYNDROME: ICD-10-CM

## 2017-08-15 DIAGNOSIS — K85.90 ACUTE PANCREATITIS, UNSPECIFIED COMPLICATION STATUS, UNSPECIFIED PANCREATITIS TYPE: Primary | ICD-10-CM

## 2017-08-15 PROCEDURE — 99214 OFFICE O/P EST MOD 30 MIN: CPT | Performed by: FAMILY MEDICINE

## 2017-08-15 RX ORDER — OXYCODONE HYDROCHLORIDE 30 MG/1
30 TABLET ORAL 4 TIMES DAILY
Qty: 60 TABLET | Refills: 0 | Status: SHIPPED | OUTPATIENT
Start: 2017-08-25 | End: 2017-09-06

## 2017-08-15 RX ORDER — MUPIROCIN 20 MG/G
OINTMENT TOPICAL 3 TIMES DAILY
Qty: 22 G | Refills: 0 | Status: SHIPPED | OUTPATIENT
Start: 2017-08-15 | End: 2018-07-31

## 2017-08-15 RX ORDER — DIAZEPAM 10 MG
TABLET ORAL
Qty: 60 TABLET | Refills: 0 | Status: SHIPPED | OUTPATIENT
Start: 2017-08-25 | End: 2017-09-27

## 2017-08-15 RX ORDER — CEPHALEXIN 500 MG/1
500 CAPSULE ORAL 3 TIMES DAILY
Qty: 21 CAPSULE | Refills: 0 | Status: SHIPPED | OUTPATIENT
Start: 2017-08-15 | End: 2017-08-22

## 2017-08-15 RX ORDER — OXYCODONE HYDROCHLORIDE AND ACETAMINOPHEN 10; 325 MG/1; MG/1
2 TABLET ORAL 4 TIMES DAILY
Qty: 120 TABLET | Refills: 0 | Status: SHIPPED | OUTPATIENT
Start: 2017-08-25 | End: 2017-09-06

## 2017-08-15 ASSESSMENT — PAIN SCALES - GENERAL: PAINLEVEL: MODERATE PAIN (5)

## 2017-08-15 NOTE — Clinical Note
Will, Jose Luis said we need to do a PA on his 30 mg oxycodone. I know the Endocet was recently approved, but I thought we had tackled 30 mg issue recently as well. If not, we can certainly do another one

## 2017-08-15 NOTE — PROGRESS NOTES
SUBJECTIVE:                                                    Jose Luis Gonzáles is a 47 year old male who presents to clinic today for the following health issues:      ED/UC Followup:    Facility:  [facility]  Date of visit: 8/9/17-8/13/17 ([place])  Reason for visit: acute pancreatitis   Current Status: improved, eating more     SUBJECTIVE:  Here today in follow-up of recent hospitalization for acute pancreatitis. Some of the records are available through care everywhere, but not full reports from CT scan. The patient brings in a disc but our computers do not have disc readers any longer. Patient was seen on a couple of occasions prior to recent hospitalization for cellulitis that started on the bridge of his nose and spread above his right eye. Treated with doxycycline but this made him quite sick and started him vomiting. That was when he presented to the emergency department and was found to have pancreatitis. Per the patient the doctors in the hospital referred to this is alcoholic pancreatitis (has a history of this in August 2016) but evidently gallstones were also seen on his CT scan. A long discussion with the patient today - given his addiction history he should not be drinking at all. He of course as it was only very occasional but I strongly advised against any alcohol whatsoever because of his addiction and his history of pancreatitis. He is doing a lot better today starting to move toward a full diet. Only a little bit of left upper quadrant pain but no longer any nausea. Stools are actually fairly normal. Still a bit tender on the bridge of his nose is no longer on any antibiotics.    From a pain standpoint, things are stable. But it sounds as though we may need a prior authorization for his 30 mg tablets.    Review of systems otherwise negative.  Past medical, family, and social history reviewed and updated in chart.    OBJECTIVE:  /75 (BP Location: Right arm, Patient  "Position: Right side, Cuff Size: Adult Regular)  Pulse 104  Temp 98  F (36.7  C) (Oral)  Resp 16  Ht 1.796 m (5' 10.7\")  Wt 76.2 kg (167 lb 14.4 oz)  SpO2 97%  BMI 23.62 kg/m2  Alert, pleasant, upbeat, and in no apparent discomfort.  Skin - mild redness and warmth upper nasal bridge extending toward the right eyebrow  S1 and S2 normal, no murmurs, clicks, gallops or rubs. Regular rate and rhythm. Chest is clear; no wheezes or rales. No edema or JVD.  The abdomen is soft without tenderness, guarding, mass, rebound or organomegaly. Bowel sounds are normal. No CVA tenderness or inguinal adenopathy noted.  Past labs reviewed with the patient.     ASSESSMENT / PLAN:  (K85.90) Acute pancreatitis, unspecified complication status, unspecified pancreatitis type  (primary encounter diagnosis)  Comment: Resolving. Discussion as above. We may need further investigation once I can see the results of the scan on biliary function and status. I do not think this is an acute issue at this time and can wait  Plan:     (L03.211) Facial cellulitis  Comment: Discussed mechanism of action of the proposed medication, as well as potential effects, both good and bad.  Patient expressed understanding and agreed with treatment.   Plan: cephALEXin (KEFLEX) 500 MG capsule, mupirocin         (BACTROBAN) 2 % ointment            (G89.4) Chronic pain syndrome  Comment: Stable. Refilled.  Plan: oxyCODONE (ROXICODONE) 30 MG IR tablet,         PERCOCET  MG per tablet, diazepam         (VALIUM) 10 MG tablet            (M54.2) Neck pain  Comment: As above  Plan: oxyCODONE (ROXICODONE) 30 MG IR tablet,         PERCOCET  MG per tablet, diazepam         (VALIUM) 10 MG tablet            Follow up one month  JANET Cui MD    (Chart documentation completed in part with Dragon voice-recognition software.  Even though reviewed some grammatical, spelling, and word errors may remain.)      "

## 2017-08-15 NOTE — MR AVS SNAPSHOT
After Visit Summary   8/15/2017    Jose Luis Christoph Gonzáles    MRN: [id number]           Patient Information     Date Of Birth          1970        Visit Information        Provider Department      8/15/2017 3:00 PM Tonie Cui MD [facility]        Today's Diagnoses     Acute pancreatitis, unspecified complication status, unspecified pancreatitis type    -  1    Facial cellulitis        Chronic pain syndrome        Neck pain           Follow-ups after your visit        Follow-up notes from your care team     Return in about 1 month (around 9/15/2017).      Who to contact     If you have questions or need follow up information about today's clinic visit or your schedule please contact [facility] directly at [phone].  Normal or non-critical lab and imaging results will be communicated to you by MyChart, letter or phone within 4 business days after the clinic has received the results. If you do not hear from us within 7 days, please contact the clinic through [employer] or phone. If you have a critical or abnormal lab result, we will notify you by phone as soon as possible.  Submit refill requests through [employer] or call your pharmacy and they will forward the refill request to us. Please allow 3 business days for your refill to be completed.          Additional Information About Your Visit        MyChart Information     [employer] gives you secure access to your electronic health record. If you see a primary care provider, you can also send messages to your care team and make appointments. If you have questions, please call your primary care clinic.  If you do not have a primary care provider, please call [phone] and they will assist you.        Care EveryWhere ID     This is your Care EveryWhere ID. This could be used by other organizations to access your [facility] medical records  [id number]        Your Vitals Were     Pulse Temperature Respirations  "Height Pulse Oximetry BMI (Body Mass Index)    104 98  F (36.7  C) (Oral) 16 1.796 m (5' 10.7\") 97% 23.62 kg/m2       Blood Pressure from Last 3 Encounters:   08/15/17 126/75   03/24/17 136/86   12/12/16 128/78    Weight from Last 3 Encounters:   08/15/17 76.2 kg (167 lb 14.4 oz)   03/24/17 77.4 kg (170 lb 11.2 oz)   12/12/16 77.6 kg (171 lb 1.6 oz)              Today, you had the following     No orders found for display         Today's Medication Changes          These changes are accurate as of: 8/15/17  3:48 PM.  If you have any questions, ask your nurse or doctor.               Start taking these medicines.        Dose/Directions    cephALEXin 500 MG capsule   Commonly known as:  KEFLEX   Used for:  Facial cellulitis   Started by:  Tonie Cui MD        Dose:  500 mg   Take 1 capsule (500 mg) by mouth 3 times daily for 7 days   Quantity:  21 capsule   Refills:  0       mupirocin 2 % ointment   Commonly known as:  BACTROBAN   Used for:  Facial cellulitis   Started by:  Tonie Cui MD        Apply topically 3 times daily   Quantity:  22 g   Refills:  0            Where to get your medicines      These medications were sent to [facility] [address] [place] [address] [address] AT [facility] [place] [address], [facility] [address]     Phone:  [phone]     cephALEXin 500 MG capsule    mupirocin 2 % ointment         Some of these will need a paper prescription and others can be bought over the counter.  Ask your nurse if you have questions.     Bring a paper prescription for each of these medications     diazepam 10 MG tablet    oxyCODONE 30 MG IR tablet    PERCOCET  MG per tablet                Primary Care Provider Office Phone # Fax #    Tonie Cui [phone] [address] [facility] [address]        Equal Access to Services     [facility] ALEXA AH: Rufino Lopez, jennifer hulladaaditi, qaybta " jose angel wright haywellington alarconalessia kilgore ah. So [facility] [phone].    ATENCIÓN: Si del hernandez, tiene a ceron disposición servicios gratuitos de asistencia lingüística. Manuelito al [phone].    We comply with applicable federal civil rights laws and [place] laws. We do not discriminate on the basis of race, color, national origin, age, disability sex, sexual orientation or gender identity.            Thank you!     Thank you for choosing [facility]  for your care. Our goal is always to provide you with excellent care. Hearing back from our patients is one way we can continue to improve our services. Please take a few minutes to complete the written survey that you may receive in the mail after your visit with us. Thank you!             Your Updated Medication List - Protect others around you: Learn how to safely use, store and throw away your medicines at www.disposemymeds.org.          This list is accurate as of: 8/15/17  3:48 PM.  Always use your most recent med list.                   Brand Name Dispense Instructions for use Diagnosis    cephALEXin 500 MG capsule    KEFLEX    21 capsule    Take 1 capsule (500 mg) by mouth 3 times daily for 7 days    Facial cellulitis       clonazePAM 1 MG tablet    klonoPIN    180 tablet    TAKE 1-2 TABLETS BY MOUTH THREE TIMES DAILY AS NEEDED FOR ANXIETY    Anxiety       diazepam 10 MG tablet   Start taking on:  8/25/2017    VALIUM    60 tablet    TAKE 1 TABLET BY MOUTH TWICE DAILY AS NEEDED FOR SLEEP    Chronic pain syndrome, Neck pain       finasteride 5 MG tablet    PROSCAR    30 tablet    Take 1 tablet (5 mg) by mouth daily    Male pattern baldness       HYDROmorphone 4 MG tablet    DILAUDID    40 tablet    Take 1 tablet (4 mg) by mouth 4 times daily as needed for breakthrough pain    Pain, dental       mupirocin 2 % ointment    BACTROBAN    22 g    Apply topically 3 times daily    Facial cellulitis       omeprazole 20 MG tablet     priLOSEC OTC    90 tablet    Take 1 tablet (20 mg) by mouth daily    Gastroesophageal reflux disease without esophagitis       * oxyCODONE 10 MG IR tablet    ROXICODONE    60 tablet    Take 1-2 tablets (10-20 mg) by mouth 3 times daily as needed for breakthrough pain    Chronic pain syndrome, Neck pain       * oxyCODONE 30 MG IR tablet   Start taking on:  8/25/2017    ROXICODONE    60 tablet    Take 1 tablet (30 mg) by mouth 4 times daily In combination with percocet.    Chronic pain syndrome, Neck pain       PERCOCET  MG per tablet   Generic drug:  oxyCODONE-acetaminophen   Start taking on:  8/25/2017     120 tablet    Take 2 tablets by mouth 4 times daily In combination with oxy IR.    Chronic pain syndrome, Neck pain       polyethylene glycol powder    MIRALAX/GLYCOLAX    527 g    STIR 17 GM OF POWDER (SEE MINGO INSIDE CAP) IN 8-OZ OF LIQUID UNTIL COMPLETELY DISSOLVED. DRINK THE SOLUTION DAILY OR AS DIRECTED.    Chronic constipation       tretinoin 0.1 % cream    RETIN-A    45 g    Apply topically At Bedtime    Acne vulgaris       * Notice:  This list has 2 medication(s) that are the same as other medications prescribed for you. Read the directions carefully, and ask your doctor or other care provider to review them with you.

## 2017-08-15 NOTE — NURSING NOTE
"Chief Complaint   Patient presents with     Derm Problem     face, sinus - swelling, red       Initial /75 (BP Location: Right arm, Patient Position: Right side, Cuff Size: Adult Regular)  Pulse 104  Temp 98  F (36.7  C) (Oral)  Resp 16  Ht 1.796 m (5' 10.7\")  Wt 76.2 kg (167 lb 14.4 oz)  SpO2 97%  BMI 23.62 kg/m2 Estimated body mass index is 23.62 kg/(m^2) as calculated from the following:    Height as of this encounter: 1.796 m (5' 10.7\").    Weight as of this encounter: 76.2 kg (167 lb 14.4 oz).  Medication Reconciliation: complete     Will Cullen JOHNSON      "

## 2017-08-15 NOTE — TELEPHONE ENCOUNTER
Received request for pt's Oxycodone -     Sent to [facility]: [id number]  Luciano Berman MA

## 2017-08-21 NOTE — TELEPHONE ENCOUNTER
Received PA form with further questions - placed on Dr. Cui's desk for review    Will Cullen JOHNSON

## 2017-08-22 NOTE — TELEPHONE ENCOUNTER
Faxed PA form - placed in PA folder until we receive response from insurance    Will Cullen JOHNSON

## 2017-09-04 ENCOUNTER — NURSE TRIAGE (OUTPATIENT)
Dept: NURSING | Facility: CLINIC | Age: 47
End: 2017-09-04

## 2017-09-04 ENCOUNTER — TELEPHONE (OUTPATIENT)
Dept: FAMILY MEDICINE | Facility: CLINIC | Age: 47
End: 2017-09-04

## 2017-09-04 DIAGNOSIS — M54.2 NECK PAIN: ICD-10-CM

## 2017-09-04 DIAGNOSIS — L73.9 FOLLICULITIS: ICD-10-CM

## 2017-09-04 DIAGNOSIS — G89.4 CHRONIC PAIN SYNDROME: Primary | ICD-10-CM

## 2017-09-04 NOTE — TELEPHONE ENCOUNTER
Pt calls and would like to make an appointment with Dr. Cui for some new signs of infection on his face that he thinks are ingrown hairs, and to complete some paperwork .  Transferred to scheduling for assistance with appointment.  CHAVO Medina

## 2017-09-04 NOTE — TELEPHONE ENCOUNTER
Patient called triage today.    Patient needs medications Oxycodone; Endoceg; and anti-botic.      Patient states that he has ingrown hairs in multiple places on the body--wish to discuss with provider directly.    Please contact patient.    Thank you.    Central Scheduling  Kylah HENDERSON

## 2017-09-05 NOTE — TELEPHONE ENCOUNTER
.Reason for Call:  call back    Detailed comments: patient retuning a call     Phone Number Patient can be reached at: Cell number on file:    Telephone Information:   Mobile [phone] or Home Phone: [phone]     Best Time: any    Can we leave a detailed message on this number? YES    Call taken on 9/5/2017 at 3:20 PM by Lenard Jeffers

## 2017-09-05 NOTE — TELEPHONE ENCOUNTER
Reason for Call:  Other call back and prescription    Detailed comments: Calling to follow up on request for medication. Going to  of [place] [facility] tomorrow and needs his medication.     Phone Number Patient can be reached at: Home number on file [phone] (home)    Best Time: Any    Can we leave a detailed message on this number? YES    Call taken on 9/5/2017 at 5:55 PM by Silverio Young

## 2017-09-05 NOTE — TELEPHONE ENCOUNTER
This writer attempted to contact Jose Luis on 09/05/17      Reason for call symptoms and left message to return call.      If patient calls back:   Patient contacted by clinic RN team. Inform patient that someone from the team will contact them, document that pt called and route to care team. .        Jessica Hawk RN

## 2017-09-06 RX ORDER — CEPHALEXIN 500 MG/1
500 CAPSULE ORAL 3 TIMES DAILY
Qty: 21 CAPSULE | Refills: 0 | Status: SHIPPED | OUTPATIENT
Start: 2017-09-06 | End: 2017-09-13

## 2017-09-06 RX ORDER — OXYCODONE HYDROCHLORIDE AND ACETAMINOPHEN 10; 325 MG/1; MG/1
2 TABLET ORAL 4 TIMES DAILY
Qty: 120 TABLET | Refills: 0 | Status: SHIPPED | OUTPATIENT
Start: 2017-09-06 | End: 2017-09-18

## 2017-09-06 RX ORDER — OXYCODONE HYDROCHLORIDE 30 MG/1
30 TABLET ORAL 4 TIMES DAILY
Qty: 60 TABLET | Refills: 0 | Status: SHIPPED | OUTPATIENT
Start: 2017-09-06 | End: 2017-09-18

## 2017-09-06 NOTE — TELEPHONE ENCOUNTER
Reviewed charting below that patient gives permission to leave detailed message.  Left detailed message of antibiotic sent with directions. Advised if symptoms do not resolve after course, please follow up in clinic. Call clinic back with any further questions or concerns.    Nicho York RN

## 2017-09-06 NOTE — TELEPHONE ENCOUNTER
Patient returned call    Best number to reach them: [phone]    Is it ok to leave a detailed message: YES

## 2017-09-06 NOTE — TELEPHONE ENCOUNTER
Routing refill request to provider for review/approval because:  Drug not on the [facility] refill protocol   Also requesting antibiotic  Routing to provider to review and advise.  Akilah Betancourt RN.

## 2017-09-06 NOTE — TELEPHONE ENCOUNTER
Patient advised percocet and oxycodone refilled and will be placed on  later today.    For the abx request, pt states the bactroban dried out the 3 ingrown hair / pustules but did not seem to help.  Pt would like an oral abx sent to [facility].    Ok to leave a detailed message with response.    Routing message to provider to review and advise.    Nicho York RN

## 2017-09-06 NOTE — TELEPHONE ENCOUNTER
Patient returned call, the patient wants the oral antibiotic for ingrown hairs.     Best number to reach them: Other phone number:  [phone]    Is it ok to leave a detailed message: YES

## 2017-09-06 NOTE — TELEPHONE ENCOUNTER
Patient returned call    Best number to reach them: Home number on file [phone] (home)    Is it ok to leave a detailed message: YES     This is the fourth time calling back*

## 2017-09-16 ENCOUNTER — NURSE TRIAGE (OUTPATIENT)
Dept: NURSING | Facility: CLINIC | Age: 47
End: 2017-09-16

## 2017-09-16 DIAGNOSIS — F41.9 ANXIETY: ICD-10-CM

## 2017-09-16 DIAGNOSIS — M54.2 NECK PAIN: ICD-10-CM

## 2017-09-16 DIAGNOSIS — G89.4 CHRONIC PAIN SYNDROME: ICD-10-CM

## 2017-09-16 NOTE — TELEPHONE ENCOUNTER
Clinic Action Needed:Yes, please return call to mom Evette  Reason for Call: Jose Luis called today to request that his Percocet, OxyCodone and Klonopin be refilled and that his mother be allowed to  written scripts from clinic.  Jose Luis is in [place], [place] and unable to get back to the [facility]. He is requesting one month refill for each medication, but asking that they be written for 15 days and a second script for another 15 days.  Jose Luis states his cell phone is not working and that his mother Evette can be contacted at [phone].  Thank you.     Routed to:  [facility]    Roberta Shane, RN  [facility]

## 2017-09-18 RX ORDER — OXYCODONE HYDROCHLORIDE 10 MG/1
10-20 TABLET ORAL 3 TIMES DAILY PRN
Qty: 60 TABLET | Refills: 0 | Status: CANCELLED | OUTPATIENT
Start: 2017-09-18

## 2017-09-19 ENCOUNTER — TELEPHONE (OUTPATIENT)
Dept: FAMILY MEDICINE | Facility: CLINIC | Age: 47
End: 2017-09-19

## 2017-09-19 RX ORDER — OXYCODONE HYDROCHLORIDE 30 MG/1
30 TABLET ORAL 4 TIMES DAILY
Qty: 60 TABLET | Refills: 0 | Status: SHIPPED | OUTPATIENT
Start: 2017-09-19 | End: 2017-09-19

## 2017-09-19 RX ORDER — OXYCODONE HYDROCHLORIDE AND ACETAMINOPHEN 10; 325 MG/1; MG/1
2 TABLET ORAL 4 TIMES DAILY
Qty: 120 TABLET | Refills: 0 | Status: SHIPPED | OUTPATIENT
Start: 2017-09-19 | End: 2017-09-19

## 2017-09-19 RX ORDER — CLONAZEPAM 1 MG/1
TABLET ORAL
Qty: 180 TABLET | Refills: 0 | Status: SHIPPED | OUTPATIENT
Start: 2017-09-19 | End: 2017-11-14

## 2017-09-19 RX ORDER — OXYCODONE HYDROCHLORIDE AND ACETAMINOPHEN 10; 325 MG/1; MG/1
2 TABLET ORAL 4 TIMES DAILY
Qty: 120 TABLET | Refills: 0 | Status: SHIPPED | OUTPATIENT
Start: 2017-10-04 | End: 2017-10-16

## 2017-09-19 RX ORDER — OXYCODONE HYDROCHLORIDE 30 MG/1
30 TABLET ORAL 4 TIMES DAILY
Qty: 60 TABLET | Refills: 0 | Status: SHIPPED | OUTPATIENT
Start: 2017-10-04 | End: 2017-10-16

## 2017-09-20 NOTE — TELEPHONE ENCOUNTER
Patient returning call per prescription, instructed patient that prescription script is ready for  at .       Thank You,    Central Scheduler  Sierra JACKSON

## 2017-09-20 NOTE — TELEPHONE ENCOUNTER
Reason for Call:  Other prescription    Detailed comments: Pt returning phone call and verbalizes understanding that Rx is ready to  at .    Phone Number Patient can be reached at: Home number on file [phone] (home)    Best Time: anytime    Can we leave a detailed message on this number? YES    Call taken on 9/20/2017 at 10:42 AM by Ashok Forde

## 2017-09-27 DIAGNOSIS — G89.4 CHRONIC PAIN SYNDROME: ICD-10-CM

## 2017-09-27 DIAGNOSIS — M54.2 NECK PAIN: ICD-10-CM

## 2017-09-27 RX ORDER — DIAZEPAM 10 MG
TABLET ORAL
Qty: 60 TABLET | Refills: 0 | Status: SHIPPED | OUTPATIENT
Start: 2017-09-27 | End: 2017-11-14

## 2017-09-27 NOTE — TELEPHONE ENCOUNTER
diazepam (VALIUM) 10 MG tablet      Last Written Prescription Date:  8/25/17  Last Fill Quantity: 60,   # refills: 0  Last Office Visit with [facility], [facility] or [facility] prescribing provider: 8/15/17  Future Office visit:       Routing refill request to provider for review/approval because:  Drug not on the [facility], [facility] or [facility] refill protocol or controlled substance

## 2017-10-16 ENCOUNTER — VIRTUAL VISIT (OUTPATIENT)
Dept: FAMILY MEDICINE | Facility: CLINIC | Age: 47
End: 2017-10-16
Payer: COMMERCIAL

## 2017-10-16 ENCOUNTER — TELEPHONE (OUTPATIENT)
Dept: FAMILY MEDICINE | Facility: CLINIC | Age: 47
End: 2017-10-16

## 2017-10-16 DIAGNOSIS — F31.9 BIPOLAR AFFECTIVE DISORDER, REMISSION STATUS UNSPECIFIED (H): ICD-10-CM

## 2017-10-16 DIAGNOSIS — G89.4 CHRONIC PAIN SYNDROME: Primary | ICD-10-CM

## 2017-10-16 DIAGNOSIS — M54.2 NECK PAIN: ICD-10-CM

## 2017-10-16 PROCEDURE — 99441 ZZC PHYSICIAN TELEPHONE EVALUATION 5-10 MIN: CPT | Performed by: FAMILY MEDICINE

## 2017-10-16 RX ORDER — OXYCODONE HYDROCHLORIDE AND ACETAMINOPHEN 10; 325 MG/1; MG/1
2 TABLET ORAL 4 TIMES DAILY
Qty: 120 TABLET | Refills: 0 | Status: SHIPPED | OUTPATIENT
Start: 2017-10-18 | End: 2017-10-16

## 2017-10-16 RX ORDER — OXYCODONE HYDROCHLORIDE 30 MG/1
30 TABLET ORAL 4 TIMES DAILY
Qty: 60 TABLET | Refills: 0 | Status: SHIPPED | OUTPATIENT
Start: 2017-11-01 | End: 2017-11-14

## 2017-10-16 RX ORDER — OXYCODONE HYDROCHLORIDE AND ACETAMINOPHEN 10; 325 MG/1; MG/1
2 TABLET ORAL 4 TIMES DAILY
Qty: 120 TABLET | Refills: 0 | Status: SHIPPED | OUTPATIENT
Start: 2017-11-01 | End: 2017-11-14

## 2017-10-16 RX ORDER — OXYCODONE HYDROCHLORIDE 30 MG/1
30 TABLET ORAL 4 TIMES DAILY
Qty: 60 TABLET | Refills: 0 | Status: SHIPPED | OUTPATIENT
Start: 2017-10-18 | End: 2017-10-16

## 2017-10-16 NOTE — MR AVS SNAPSHOT
After Visit Summary   10/16/2017    Jose Luis Christoph Gonzáles    MRN: [id number]           Patient Information     Date Of Birth          1970        Visit Information        Provider Department      10/16/2017 5:40 PM Tonie Cui MD [facility]        Today's Diagnoses     Chronic pain syndrome    -  1    Neck pain        Bipolar affective disorder, remission status unspecified (H)           Follow-ups after your visit        Additional Services     MENTAL HEALTH REFERRAL       Your provider has referred you to: G: [facility] - Counseling (Individual/Couples/Family) - per patient choice     All scheduling is subject to the client's specific insurance plan & benefits, provider/location availability, and provider clinical specialities.  Please arrive 15 minutes early for your first appointment and bring your completed paperwork.    Please be aware that coverage of these services is subject to the terms and limitations of your health insurance plan.  Call member services at your health plan with any benefit or coverage questions.                  Who to contact     If you have questions or need follow up information about today's clinic visit or your schedule please contact [facility] directly at [phone].  Normal or non-critical lab and imaging results will be communicated to you by Marakanahart, letter or phone within 4 business days after the clinic has received the results. If you do not hear from us within 7 days, please contact the clinic through Marakanahart or phone. If you have a critical or abnormal lab result, we will notify you by phone as soon as possible.  Submit refill requests through Filter Squad or call your pharmacy and they will forward the refill request to us. Please allow 3 business days for your refill to be completed.          Additional Information About Your Visit        Marakanahart Information     Filter Squad gives you secure access  to your electronic health record. If you see a primary care provider, you can also send messages to your care team and make appointments. If you have questions, please call your primary care clinic.  If you do not have a primary care provider, please call [phone] and they will assist you.        Care EveryWhere ID     This is your Care EveryWhere ID. This could be used by other organizations to access your [place] medical records  [id number]         Blood Pressure from Last 3 Encounters:   08/15/17 126/75   03/24/17 136/86   12/12/16 128/78    Weight from Last 3 Encounters:   08/15/17 76.2 kg (167 lb 14.4 oz)   03/24/17 77.4 kg (170 lb 11.2 oz)   12/12/16 77.6 kg (171 lb 1.6 oz)              We Performed the Following     MENTAL HEALTH REFERRAL          Today's Medication Changes          These changes are accurate as of: 10/16/17  7:08 PM.  If you have any questions, ask your nurse or doctor.               Start taking these medicines.        Dose/Directions    PERCOCET  MG per tablet   Used for:  Chronic pain syndrome, Neck pain   Generic drug:  oxyCODONE-acetaminophen   Started by:  Tonie Cui MD        Dose:  2 tablet   Start taking on:  11/1/2017   Take 2 tablets by mouth 4 times daily In combination with oxy IR.   Quantity:  120 tablet   Refills:  0         These medicines have changed or have updated prescriptions.        Dose/Directions    * oxyCODONE 10 MG IR tablet   Commonly known as:  ROXICODONE   This may have changed:  Another medication with the same name was added. Make sure you understand how and when to take each.   Used for:  Chronic pain syndrome, Neck pain   Changed by:  Tonie Cui MD        Dose:  10-20 mg   Take 1-2 tablets (10-20 mg) by mouth 3 times daily as needed for breakthrough pain   Quantity:  60 tablet   Refills:  0       * oxyCODONE 30 MG IR tablet   Commonly known as:  ROXICODONE   This may have changed:  You were already taking a medication  with the same name, and this prescription was added. Make sure you understand how and when to take each.   Used for:  Chronic pain syndrome, Neck pain   Changed by:  Tonie Cui MD        Dose:  30 mg   Start taking on:  11/1/2017   Take 1 tablet (30 mg) by mouth 4 times daily In combination with percocet.   Quantity:  60 tablet   Refills:  0       * Notice:  This list has 2 medication(s) that are the same as other medications prescribed for you. Read the directions carefully, and ask your doctor or other care provider to review them with you.      Stop taking these medicines if you haven't already. Please contact your care team if you have questions.     HYDROmorphone 4 MG tablet   Commonly known as:  DILAUDID   Stopped by:  Tonie Cui MD                Where to get your medicines      Some of these will need a paper prescription and others can be bought over the counter.  Ask your nurse if you have questions.     Bring a paper prescription for each of these medications     oxyCODONE 30 MG IR tablet    PERCOCET  MG per tablet                Primary Care Provider Office Phone # Fax #    Tonie Cui [phone] [address] [facility] [address]        Equal Access to Services     LEONIE LIU AH: Hadii bishop simono Sokarriali, waaxda luqadaha, qaybta kaalmada adeegyada, jose angel yarbrough. So [facility] [place] [phone].    ATENCIÓN: Si habla español, tiene a ceron disposición servicios gratuitos de asistencia lingüística. Llame al [phone].    We comply with applicable federal civil rights laws and [place] laws. We do not discriminate on the basis of race, color, national origin, age, disability, sex, sexual orientation, or gender identity.            Thank you!     Thank you for choosing [facility]  for your care. Our goal is always to provide you with excellent care. Hearing back from our patients is one way we  can continue to improve our services. Please take a few minutes to complete the written survey that you may receive in the mail after your visit with us. Thank you!             Your Updated Medication List - Protect others around you: Learn how to safely use, store and throw away your medicines at www.disposemymeds.org.          This list is accurate as of: 10/16/17  7:08 PM.  Always use your most recent med list.                   Brand Name Dispense Instructions for use Diagnosis    clonazePAM 1 MG tablet    klonoPIN    180 tablet    TAKE 1-2 TABLETS BY MOUTH THREE TIMES DAILY AS NEEDED FOR ANXIETY    Anxiety       diazepam 10 MG tablet    VALIUM    60 tablet    TAKE 1 TABLET BY MOUTH TWICE DAILY AS NEEDED FOR SLEEP    Chronic pain syndrome, Neck pain       finasteride 5 MG tablet    PROSCAR    30 tablet    Take 1 tablet (5 mg) by mouth daily    Male pattern baldness       mupirocin 2 % ointment    BACTROBAN    22 g    Apply topically 3 times daily    Facial cellulitis       omeprazole 20 MG tablet    priLOSEC OTC    90 tablet    Take 1 tablet (20 mg) by mouth daily    Gastroesophageal reflux disease without esophagitis       * oxyCODONE 10 MG IR tablet    ROXICODONE    60 tablet    Take 1-2 tablets (10-20 mg) by mouth 3 times daily as needed for breakthrough pain    Chronic pain syndrome, Neck pain       * oxyCODONE 30 MG IR tablet   Start taking on:  11/1/2017    ROXICODONE    60 tablet    Take 1 tablet (30 mg) by mouth 4 times daily In combination with percocet.    Chronic pain syndrome, Neck pain       PERCOCET  MG per tablet   Generic drug:  oxyCODONE-acetaminophen   Start taking on:  11/1/2017     120 tablet    Take 2 tablets by mouth 4 times daily In combination with oxy IR.    Chronic pain syndrome, Neck pain       polyethylene glycol powder    MIRALAX/GLYCOLAX    527 g    STIR 17 GM OF POWDER (SEE MINGO INSIDE CAP) IN 8-OZ OF LIQUID UNTIL COMPLETELY DISSOLVED. DRINK THE SOLUTION DAILY OR AS DIRECTED.     Chronic constipation       tretinoin 0.1 % cream    RETIN-A    45 g    Apply topically At Bedtime    Acne vulgaris       * Notice:  This list has 2 medication(s) that are the same as other medications prescribed for you. Read the directions carefully, and ask your doctor or other care provider to review them with you.

## 2017-10-16 NOTE — TELEPHONE ENCOUNTER
Please call patient at [phone]. Patient states Dr. Cui called him on a number that is currently not working and is requesting to be called back on this number. Thanks.

## 2017-10-17 NOTE — PROGRESS NOTES
SUBJECTIVE:  Spoke with patient via phone visit regarding his ongoing pain medications and his overall mood. Pain medications are working well without side effects. Needs upcoming refills. We have kept this to 2 weeks at a time, filling two prescriptions at a time.  He feels he is struggling overall with his mood. Has a history of bipolar and at times this is more an issue of anxiety, but more recently just feeling down and depressed. Very happy with some of the things going on in life but struggles because of lack of money, girlfriend, etc. He would like to get back in with a counselor to help discuss.  He also notes he's been feeling some lumps underneath his nipples that are little bit tender wonders if they are lymph nodes. Has a history of low testosterone but we have not checked this in a few years    Review of systems otherwise negative.  Past medical, family, and social history reviewed and updated in chart.    OBJECTIVE:  There were no vitals taken for this visit.  No exam done - phone visit    ASSESSMENT / PLAN:  (G89.4) Chronic pain syndrome  (primary encounter diagnosis)  Comment: Stable and current regimen. Refilled as above. Monitored.  Plan: oxyCODONE (ROXICODONE) 30 MG IR tablet,         PERCOCET  MG per tablet, DISCONTINUED:         oxyCODONE (ROXICODONE) 30 MG IR tablet,         DISCONTINUED: PERCOCET  MG per tablet            (M54.2) Neck pain  Comment: As above  Plan: oxyCODONE (ROXICODONE) 30 MG IR tablet,         PERCOCET  MG per tablet, DISCONTINUED:         oxyCODONE (ROXICODONE) 30 MG IR tablet,         DISCONTINUED: PERCOCET  MG per tablet            (F31.9) Bipolar affective disorder, remission status unspecified (H)  Comment: Referred for counseling  Plan: MENTAL HEALTH REFERRAL          Suggested an in office visit for our next visit and we can recheck lab work including testosterone    Follow up as above  7 minutes spent on this phone visit  JANET Cui  MD    (Chart documentation completed in part with Dragon voice-recognition software.  Even though reviewed some grammatical, spelling, and word errors may remain.)

## 2017-10-17 NOTE — TELEPHONE ENCOUNTER
Per chart review, Dr. Cui did speak to patient during virtual visit, after this message was recieved. New number listed below is already updated in demographics.   Closing encounter.     Janette Marrufo RN

## 2017-11-14 ENCOUNTER — OFFICE VISIT (OUTPATIENT)
Dept: FAMILY MEDICINE | Facility: CLINIC | Age: 47
End: 2017-11-14
Payer: COMMERCIAL

## 2017-11-14 ENCOUNTER — TELEPHONE (OUTPATIENT)
Dept: FAMILY MEDICINE | Facility: CLINIC | Age: 47
End: 2017-11-14

## 2017-11-14 VITALS
HEART RATE: 110 BPM | TEMPERATURE: 98.3 F | DIASTOLIC BLOOD PRESSURE: 80 MMHG | RESPIRATION RATE: 16 BRPM | OXYGEN SATURATION: 97 % | WEIGHT: 176.2 LBS | HEIGHT: 71 IN | BODY MASS INDEX: 24.67 KG/M2 | SYSTOLIC BLOOD PRESSURE: 122 MMHG

## 2017-11-14 DIAGNOSIS — G89.4 CHRONIC PAIN SYNDROME: ICD-10-CM

## 2017-11-14 DIAGNOSIS — M54.2 NECK PAIN: ICD-10-CM

## 2017-11-14 DIAGNOSIS — E29.1 MALE HYPOGONADISM: ICD-10-CM

## 2017-11-14 DIAGNOSIS — F41.9 ANXIETY: ICD-10-CM

## 2017-11-14 DIAGNOSIS — J06.9 UPPER RESPIRATORY TRACT INFECTION, UNSPECIFIED TYPE: Primary | ICD-10-CM

## 2017-11-14 PROCEDURE — 99214 OFFICE O/P EST MOD 30 MIN: CPT | Performed by: FAMILY MEDICINE

## 2017-11-14 RX ORDER — OXYCODONE HYDROCHLORIDE 30 MG/1
30 TABLET ORAL 4 TIMES DAILY
Qty: 60 TABLET | Refills: 0 | Status: SHIPPED | OUTPATIENT
Start: 2017-11-28 | End: 2017-12-05

## 2017-11-14 RX ORDER — OXYCODONE HYDROCHLORIDE 30 MG/1
30 TABLET ORAL 4 TIMES DAILY
Qty: 60 TABLET | Refills: 0 | Status: SHIPPED | OUTPATIENT
Start: 2017-11-14 | End: 2017-11-14

## 2017-11-14 RX ORDER — OXYCODONE HYDROCHLORIDE AND ACETAMINOPHEN 10; 325 MG/1; MG/1
2 TABLET ORAL 4 TIMES DAILY
Qty: 120 TABLET | Refills: 0 | Status: SHIPPED | OUTPATIENT
Start: 2017-11-14 | End: 2017-11-14

## 2017-11-14 RX ORDER — AMOXICILLIN 875 MG
875 TABLET ORAL 2 TIMES DAILY
Qty: 20 TABLET | Refills: 0 | Status: SHIPPED | OUTPATIENT
Start: 2017-11-14 | End: 2017-12-05

## 2017-11-14 RX ORDER — OXYCODONE HYDROCHLORIDE AND ACETAMINOPHEN 10; 325 MG/1; MG/1
2 TABLET ORAL 4 TIMES DAILY
Qty: 120 TABLET | Refills: 0 | Status: SHIPPED | OUTPATIENT
Start: 2017-11-28 | End: 2017-12-05

## 2017-11-14 RX ORDER — DIAZEPAM 10 MG
TABLET ORAL
Qty: 60 TABLET | Refills: 0 | Status: SHIPPED | OUTPATIENT
Start: 2017-11-14 | End: 2017-12-14

## 2017-11-14 RX ORDER — PREDNISONE 20 MG/1
40 TABLET ORAL DAILY
Qty: 14 TABLET | Refills: 0 | Status: SHIPPED | OUTPATIENT
Start: 2017-11-14 | End: 2017-11-21

## 2017-11-14 RX ORDER — TESTOSTERONE GEL, 1% 10 MG/G
4 GEL TRANSDERMAL DAILY
Qty: 75 G | Refills: 2 | Status: SHIPPED | OUTPATIENT
Start: 2017-11-14 | End: 2017-11-28

## 2017-11-14 RX ORDER — CLONAZEPAM 1 MG/1
TABLET ORAL
Qty: 180 TABLET | Refills: 0 | Status: SHIPPED | OUTPATIENT
Start: 2017-11-14 | End: 2018-04-04

## 2017-11-14 ASSESSMENT — PATIENT HEALTH QUESTIONNAIRE - PHQ9
SUM OF ALL RESPONSES TO PHQ QUESTIONS 1-9: 8
5. POOR APPETITE OR OVEREATING: NOT AT ALL

## 2017-11-14 ASSESSMENT — PAIN SCALES - GENERAL: PAINLEVEL: SEVERE PAIN (6)

## 2017-11-14 ASSESSMENT — ANXIETY QUESTIONNAIRES
3. WORRYING TOO MUCH ABOUT DIFFERENT THINGS: MORE THAN HALF THE DAYS
5. BEING SO RESTLESS THAT IT IS HARD TO SIT STILL: NEARLY EVERY DAY
GAD7 TOTAL SCORE: 10
2. NOT BEING ABLE TO STOP OR CONTROL WORRYING: SEVERAL DAYS
IF YOU CHECKED OFF ANY PROBLEMS ON THIS QUESTIONNAIRE, HOW DIFFICULT HAVE THESE PROBLEMS MADE IT FOR YOU TO DO YOUR WORK, TAKE CARE OF THINGS AT HOME, OR GET ALONG WITH OTHER PEOPLE: VERY DIFFICULT
6. BECOMING EASILY ANNOYED OR IRRITABLE: NEARLY EVERY DAY
7. FEELING AFRAID AS IF SOMETHING AWFUL MIGHT HAPPEN: NOT AT ALL
1. FEELING NERVOUS, ANXIOUS, OR ON EDGE: SEVERAL DAYS

## 2017-11-14 NOTE — TELEPHONE ENCOUNTER
PA needed per Mirna for Androgel 1.62% (20.25 MG/1.25GM) Pump and Clonazepam 1mg tablets.    Plan does not cover medications.  Please call plan at [phone] to start PA or fax pharmacy to change medication.  Patient ID # is [id number].      Hyacinth PIZANO (R))

## 2017-11-14 NOTE — TELEPHONE ENCOUNTER
OK for a PA on the androgel (although patient might be able to check with his insurance as to what form is covered)    I'm somewhat shocked about the clonazepam - that's a cheap generic drug.  OK for a PA I guess

## 2017-11-14 NOTE — MR AVS SNAPSHOT
After Visit Summary   11/14/2017    Jose Luis Christoph Gonzáles    MRN: [id number]           Patient Information     Date Of Birth          1970        Visit Information        Provider Department      11/14/2017 12:00 PM Tonie Cui MD [facility]        Today's Diagnoses     Upper respiratory tract infection, unspecified type    -  1    Male hypogonadism        Chronic pain syndrome        Neck pain        Anxiety           Follow-ups after your visit        Follow-up notes from your care team     Return in about 1 month (around 12/14/2017).      Who to contact     If you have questions or need follow up information about today's clinic visit or your schedule please contact [facility] directly at [phone].  Normal or non-critical lab and imaging results will be communicated to you by MyChart, letter or phone within 4 business days after the clinic has received the results. If you do not hear from us within 7 days, please contact the clinic through Pure Focushart or phone. If you have a critical or abnormal lab result, we will notify you by phone as soon as possible.  Submit refill requests through Dryad or call your pharmacy and they will forward the refill request to us. Please allow 3 business days for your refill to be completed.          Additional Information About Your Visit        MyChart Information     Dryad gives you secure access to your electronic health record. If you see a primary care provider, you can also send messages to your care team and make appointments. If you have questions, please call your primary care clinic.  If you do not have a primary care provider, please call [phone] and they will assist you.        Care EveryWhere ID     This is your Care EveryWhere ID. This could be used by other organizations to access your [place] medical records  [id number]        Your Vitals Were     Pulse Temperature Respirations Height Pulse  "Oximetry BMI (Body Mass Index)    110 98.3  F (36.8  C) (Oral) 16 1.796 m (5' 10.7\") 97% 24.78 kg/m2       Blood Pressure from Last 3 Encounters:   11/14/17 122/80   08/15/17 126/75   03/24/17 136/86    Weight from Last 3 Encounters:   11/14/17 79.9 kg (176 lb 3.2 oz)   08/15/17 76.2 kg (167 lb 14.4 oz)   03/24/17 77.4 kg (170 lb 11.2 oz)              Today, you had the following     No orders found for display         Today's Medication Changes          These changes are accurate as of: 11/14/17  1:16 PM.  If you have any questions, ask your nurse or doctor.               Start taking these medicines.        Dose/Directions    amoxicillin 875 MG tablet   Commonly known as:  AMOXIL   Used for:  Upper respiratory tract infection, unspecified type   Started by:  Tonie Cui MD        Dose:  875 mg   Take 1 tablet (875 mg) by mouth 2 times daily   Quantity:  20 tablet   Refills:  0       PERCOCET  MG per tablet   Used for:  Chronic pain syndrome, Neck pain   Generic drug:  oxyCODONE-acetaminophen   Started by:  Tonie Cui MD        Dose:  2 tablet   Start taking on:  11/28/2017   Take 2 tablets by mouth 4 times daily In combination with oxy IR.   Quantity:  120 tablet   Refills:  0       predniSONE 20 MG tablet   Commonly known as:  DELTASONE   Used for:  Upper respiratory tract infection, unspecified type   Started by:  Tonie Cui MD        Dose:  40 mg   Take 2 tablets (40 mg) by mouth daily for 7 days   Quantity:  14 tablet   Refills:  0       testosterone 12.5 MG/ACT (1%) gel   Commonly known as:  ANDROGEL 1% PUMP   Used for:  Male hypogonadism   Started by:  Tonie Cui MD        Dose:  4 pump   Place 4 pumps (50 mg) onto the skin daily Apply from dispenser to clean, dry, intact skin of the shoulders, upper arms, or abdomen.   Quantity:  75 g   Refills:  2         These medicines have changed or have updated prescriptions.        Dose/Directions    diazepam " 10 MG tablet   Commonly known as:  VALIUM   This may have changed:  See the new instructions.   Used for:  Chronic pain syndrome, Neck pain   Changed by:  Tonie Cui MD        TAKE 1 TABLET BY MOUTH TWICE DAILY AS NEEDED FOR SLEEP   Quantity:  60 tablet   Refills:  0       * oxyCODONE IR 10 MG tablet   Commonly known as:  ROXICODONE   This may have changed:  Another medication with the same name was added. Make sure you understand how and when to take each.   Used for:  Chronic pain syndrome, Neck pain   Changed by:  Tonie Cui MD        Dose:  10-20 mg   Take 1-2 tablets (10-20 mg) by mouth 3 times daily as needed for breakthrough pain   Quantity:  60 tablet   Refills:  0       * oxyCODONE IR 30 MG tablet   Commonly known as:  ROXICODONE   This may have changed:  You were already taking a medication with the same name, and this prescription was added. Make sure you understand how and when to take each.   Used for:  Chronic pain syndrome, Neck pain   Changed by:  Tonie Cui MD        Dose:  30 mg   Start taking on:  11/28/2017   Take 1 tablet (30 mg) by mouth 4 times daily In combination with percocet.   Quantity:  60 tablet   Refills:  0       * Notice:  This list has 2 medication(s) that are the same as other medications prescribed for you. Read the directions carefully, and ask your doctor or other care provider to review them with you.         Where to get your medicines      Some of these will need a paper prescription and others can be bought over the counter.  Ask your nurse if you have questions.     Bring a paper prescription for each of these medications     amoxicillin 875 MG tablet    clonazePAM 1 MG tablet    diazepam 10 MG tablet    oxyCODONE IR 30 MG tablet    PERCOCET  MG per tablet    predniSONE 20 MG tablet    testosterone 12.5 MG/ACT (1%) gel                Primary Care Provider Office Phone # Fax #    Tonie Cui [phone]        [address] [facility] [address]        Equal Access to Services     RONDALEONIE ALEXA : Hadii bishop ku hadfarhanao Sokarriali, waaxda luqadaha, qaybta kaalmada doriandavidada, waxay idiin hayconcepcióndavion fragosokiranabhilash yarbrough. So [facility] [phone].    ATENCIÓN: Si habla español, tiene a ceron disposición servicios gratuitos de asistencia lingüística. [facility] [phone].    We comply with applicable federal civil rights laws and [place] laws. We do not discriminate on the basis of race, color, national origin, age, disability, sex, sexual orientation, or gender identity.            Thank you!     Thank you for choosing [facility]  for your care. Our goal is always to provide you with excellent care. Hearing back from our patients is one way we can continue to improve our services. Please take a few minutes to complete the written survey that you may receive in the mail after your visit with us. Thank you!             Your Updated Medication List - Protect others around you: Learn how to safely use, store and throw away your medicines at www.disposemymeds.org.          This list is accurate as of: 11/14/17  1:16 PM.  Always use your most recent med list.                   Brand Name Dispense Instructions for use Diagnosis    amoxicillin 875 MG tablet    AMOXIL    20 tablet    Take 1 tablet (875 mg) by mouth 2 times daily    Upper respiratory tract infection, unspecified type       clonazePAM 1 MG tablet    klonoPIN    180 tablet    TAKE 1-2 TABLETS BY MOUTH THREE TIMES DAILY AS NEEDED FOR ANXIETY    Anxiety       diazepam 10 MG tablet    VALIUM    60 tablet    TAKE 1 TABLET BY MOUTH TWICE DAILY AS NEEDED FOR SLEEP    Chronic pain syndrome, Neck pain       finasteride 5 MG tablet    PROSCAR    30 tablet    Take 1 tablet (5 mg) by mouth daily    Male pattern baldness       mupirocin 2 % ointment    BACTROBAN    22 g    Apply topically 3 times daily    Facial cellulitis       omeprazole 20 MG tablet    priLOSEC  OTC    90 tablet    Take 1 tablet (20 mg) by mouth daily    Gastroesophageal reflux disease without esophagitis       * oxyCODONE IR 10 MG tablet    ROXICODONE    60 tablet    Take 1-2 tablets (10-20 mg) by mouth 3 times daily as needed for breakthrough pain    Chronic pain syndrome, Neck pain       * oxyCODONE IR 30 MG tablet   Start taking on:  11/28/2017    ROXICODONE    60 tablet    Take 1 tablet (30 mg) by mouth 4 times daily In combination with percocet.    Chronic pain syndrome, Neck pain       PERCOCET  MG per tablet   Generic drug:  oxyCODONE-acetaminophen   Start taking on:  11/28/2017     120 tablet    Take 2 tablets by mouth 4 times daily In combination with oxy IR.    Chronic pain syndrome, Neck pain       polyethylene glycol powder    MIRALAX/GLYCOLAX    527 g    STIR 17 GM OF POWDER (SEE MINGO INSIDE CAP) IN 8-OZ OF LIQUID UNTIL COMPLETELY DISSOLVED. DRINK THE SOLUTION DAILY OR AS DIRECTED.    Chronic constipation       predniSONE 20 MG tablet    DELTASONE    14 tablet    Take 2 tablets (40 mg) by mouth daily for 7 days    Upper respiratory tract infection, unspecified type       testosterone 12.5 MG/ACT (1%) gel    ANDROGEL 1% PUMP    75 g    Place 4 pumps (50 mg) onto the skin daily Apply from dispenser to clean, dry, intact skin of the shoulders, upper arms, or abdomen.    Male hypogonadism       tretinoin 0.1 % cream    RETIN-A    45 g    Apply topically At Bedtime    Acne vulgaris       * Notice:  This list has 2 medication(s) that are the same as other medications prescribed for you. Read the directions carefully, and ask your doctor or other care provider to review them with you.
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
## 2017-11-14 NOTE — TELEPHONE ENCOUNTER
Called pharmacy to clarify -     Insurance only covers 3 tablets daily of the clonazepam. -- listing it 1-2 TID is what has caused the PA request.    Androgel - does need PA

## 2017-11-14 NOTE — NURSING NOTE
"Chief Complaint   Patient presents with     Recheck Medication       Initial /80 (BP Location: Right arm, Patient Position: Right side, Cuff Size: Adult Regular)  Pulse 110  Temp 98.3  F (36.8  C) (Oral)  Resp 16  Ht 1.796 m (5' 10.7\")  Wt 79.9 kg (176 lb 3.2 oz)  SpO2 97%  BMI 24.78 kg/m2 Estimated body mass index is 24.78 kg/(m^2) as calculated from the following:    Height as of this encounter: 1.796 m (5' 10.7\").    Weight as of this encounter: 79.9 kg (176 lb 3.2 oz).  Medication Reconciliation: complete     Will Culeln JOHNSON      "

## 2017-11-15 ASSESSMENT — ANXIETY QUESTIONNAIRES: GAD7 TOTAL SCORE: 10

## 2017-11-15 NOTE — TELEPHONE ENCOUNTER
Sent PA's over CMM    Androgel - Key: [id number]    Clonazepam - Key: [id number]      Will await response from insurance    Luciano Berman MA

## 2017-11-15 NOTE — TELEPHONE ENCOUNTER
Reason for Call:  Other     Detailed comments: patient would like the Prior authorization to be sent as urgent so it can take up to 72 hours to be looked at. Patient states if it's not marked urgent it can take two weeks or more. Please contact patient to further discuss. Thanks.     Phone Number Patient can be reached at: Cell number on file:    Telephone Information:   Mobile [phone]       Best Time: anytime     Can we leave a detailed message on this number? YES    Call taken on 11/15/2017 at 2:52 PM by Harvey Gordillo

## 2017-11-17 NOTE — TELEPHONE ENCOUNTER
Received PA approval for Clonazepam. Informed patient.    HOWARD SCHMITZ (Champagne: [id number])  ClonazePAM 1MG tablets  Status: PA Response - Approved  Created: November 15th, 2017  Sent: November 15th, 2017  Open  Archive    Will Cullen JOHNSON

## 2017-11-20 NOTE — TELEPHONE ENCOUNTER
Received PA denial for pt's Testosterone. Placed on Dr. Cui's desk for review    Luciano Berman MA

## 2017-11-20 NOTE — TELEPHONE ENCOUNTER
Received PA form for pt's Testosterone.    Placed on Dr. Cui's desk for review    Will Cullen JOHNSON

## 2017-11-21 NOTE — TELEPHONE ENCOUNTER
Patient said he never tried injectables but he would be fine with that  Call to advise    Can that be called to [facility] [phone]    Thank you from patient he says his lungs are pretty clear now

## 2017-11-21 NOTE — TELEPHONE ENCOUNTER
This writer attempted to contact pt on 11/21/17      Reason for call testosterone and left detailed message.      If patient calls back:   Relay message below, (read verbatim), document that pt called and close encounter.        Ashley Disla, CMA

## 2017-11-28 ENCOUNTER — TELEPHONE (OUTPATIENT)
Dept: FAMILY MEDICINE | Facility: CLINIC | Age: 47
End: 2017-11-28

## 2017-11-28 DIAGNOSIS — F41.9 ANXIETY: ICD-10-CM

## 2017-11-28 DIAGNOSIS — E29.1 MALE HYPOGONADISM: ICD-10-CM

## 2017-11-28 RX ORDER — CLONAZEPAM 1 MG/1
TABLET ORAL
Qty: 180 TABLET | Refills: 0 | Status: CANCELLED | OUTPATIENT
Start: 2017-11-28

## 2017-11-28 NOTE — TELEPHONE ENCOUNTER
Reason for Call:  Medication or medication refill:    Do you use a [facility]?  Name of the pharmacy and phone number for the current request:  [id number] - [place], [place] - [address] AT [facility] [address]    Name of the medication requested:     Other request:      AT BEDTIME 1 ordered  Reorder    testosterone (ANDROGEL 1% PUMP) 12.5 MG/ACT (1%) gel          THE ABOVE MEDICATION PA WAS DENIED AND PHYSICAL THERAPY NEEDS TO TRY SOMETHING ELSE AND PATIENT HAS A LIST OF MEDS HE WOULD HAVE TO TRY FIRST.    Can we leave a detailed message on this number? YES    Phone number patient can be reached at: Home number on file [phone] (home)    Best Time: ANY    Call taken on 11/28/2017 at 8:57 AM by Lili Del Angel

## 2017-11-28 NOTE — TELEPHONE ENCOUNTER
Called patient -   Testosterone gel 1% - actavis (25 or 50 MG)  Testosterone gel pump 12.5mg - actavis  Cypionate 100/200 MG   Testosterone Enanthate 200 MG    Pt says these are covered alternatives.     Routing to Dr. Cui    Pt states only getting 90 of the 180 of Klonopin.     Luciano Berman MA

## 2017-12-05 ENCOUNTER — VIRTUAL VISIT (OUTPATIENT)
Dept: FAMILY MEDICINE | Facility: CLINIC | Age: 47
End: 2017-12-05
Payer: COMMERCIAL

## 2017-12-05 DIAGNOSIS — G89.4 CHRONIC PAIN SYNDROME: Primary | ICD-10-CM

## 2017-12-05 DIAGNOSIS — L70.0 ACNE VULGARIS: ICD-10-CM

## 2017-12-05 DIAGNOSIS — E29.1 HYPOGONADISM MALE: ICD-10-CM

## 2017-12-05 DIAGNOSIS — M54.2 NECK PAIN: ICD-10-CM

## 2017-12-05 PROCEDURE — 99441 ZZC PHYSICIAN TELEPHONE EVALUATION 5-10 MIN: CPT | Performed by: FAMILY MEDICINE

## 2017-12-05 RX ORDER — TESTOSTERONE CYPIONATE 1000 MG/10ML
50 INJECTION, SOLUTION INTRAMUSCULAR
Qty: 3 ML | Refills: 5 | Status: SHIPPED | OUTPATIENT
Start: 2017-12-05 | End: 2018-01-24

## 2017-12-05 RX ORDER — OXYCODONE HYDROCHLORIDE 30 MG/1
30 TABLET ORAL 4 TIMES DAILY
Qty: 60 TABLET | Refills: 0 | Status: SHIPPED | OUTPATIENT
Start: 2017-12-23 | End: 2018-01-02

## 2017-12-05 RX ORDER — OXYCODONE HYDROCHLORIDE AND ACETAMINOPHEN 10; 325 MG/1; MG/1
2 TABLET ORAL 4 TIMES DAILY
Qty: 120 TABLET | Refills: 0 | Status: SHIPPED | OUTPATIENT
Start: 2017-12-23 | End: 2018-01-02

## 2017-12-05 RX ORDER — OXYCODONE HYDROCHLORIDE AND ACETAMINOPHEN 10; 325 MG/1; MG/1
2 TABLET ORAL 4 TIMES DAILY
Qty: 120 TABLET | Refills: 0 | Status: SHIPPED | OUTPATIENT
Start: 2017-12-12 | End: 2017-12-05

## 2017-12-05 RX ORDER — OXYCODONE HYDROCHLORIDE 30 MG/1
30 TABLET ORAL 4 TIMES DAILY
Qty: 60 TABLET | Refills: 0 | Status: SHIPPED | OUTPATIENT
Start: 2017-12-12 | End: 2017-12-05

## 2017-12-05 RX ORDER — ADAPALENE 45 G/G
GEL TOPICAL AT BEDTIME
Qty: 45 G | Refills: 2 | Status: SHIPPED | OUTPATIENT
Start: 2017-12-05 | End: 2018-11-24
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
## 2017-12-05 NOTE — MR AVS SNAPSHOT
After Visit Summary   12/5/2017    Jose Luis Christoph Gonzáles    MRN: [id number]           Patient Information     Date Of Birth          1970        Visit Information        Provider Department      12/5/2017 5:40 PM Tonie Cui MD [facility]        Today's Diagnoses     Chronic pain syndrome    -  1    Neck pain        Hypogonadism male        Acne vulgaris           Follow-ups after your visit        Follow-up notes from your care team     Return in about 1 month (around 1/5/2018).      Who to contact     If you have questions or need follow up information about today's clinic visit or your schedule please contact [facility] directly at [phone].  Normal or non-critical lab and imaging results will be communicated to you by MyChart, letter or phone within 4 business days after the clinic has received the results. If you do not hear from us within 7 days, please contact the clinic through [employer] or phone. If you have a critical or abnormal lab result, we will notify you by phone as soon as possible.  Submit refill requests through [employer] or call your pharmacy and they will forward the refill request to us. Please allow 3 business days for your refill to be completed.          Additional Information About Your Visit        MyChart Information     [employer] gives you secure access to your electronic health record. If you see a primary care provider, you can also send messages to your care team and make appointments. If you have questions, please call your primary care clinic.  If you do not have a primary care provider, please call [phone] and they will assist you.        Care EveryWhere ID     This is your Care EveryWhere ID. This could be used by other organizations to access your [facility] medical records  [id number]         Blood Pressure from Last 3 Encounters:   11/14/17 122/80   08/15/17 126/75   03/24/17 136/86    Weight from Last 3  Encounters:   11/14/17 79.9 kg (176 lb 3.2 oz)   08/15/17 76.2 kg (167 lb 14.4 oz)   03/24/17 77.4 kg (170 lb 11.2 oz)              Today, you had the following     No orders found for display         Today's Medication Changes          These changes are accurate as of: 12/5/17  6:08 PM.  If you have any questions, ask your nurse or doctor.               Start taking these medicines.        Dose/Directions    adapalene 0.1 % gel   Commonly known as:  DIFFERIN   Used for:  Acne vulgaris        Apply topically At Bedtime   Quantity:  45 g   Refills:  2       PERCOCET  MG per tablet   Used for:  Chronic pain syndrome, Neck pain   Generic drug:  oxyCODONE-acetaminophen        Dose:  2 tablet   Start taking on:  12/23/2017   Take 2 tablets by mouth 4 times daily In combination with oxy IR.   Quantity:  120 tablet   Refills:  0       testosterone cypionate 100 MG/ML Soln inj   Used for:  Hypogonadism male        Dose:  50 mg   Inject 0.5 mLs (50 mg) into the muscle every 14 days   Quantity:  3 mL   Refills:  5         These medicines have changed or have updated prescriptions.        Dose/Directions    * oxyCODONE IR 10 MG tablet   Commonly known as:  ROXICODONE   This may have changed:  Another medication with the same name was added. Make sure you understand how and when to take each.   Used for:  Chronic pain syndrome, Neck pain        Dose:  10-20 mg   Take 1-2 tablets (10-20 mg) by mouth 3 times daily as needed for breakthrough pain   Quantity:  60 tablet   Refills:  0       * oxyCODONE IR 30 MG tablet   Commonly known as:  ROXICODONE   This may have changed:  You were already taking a medication with the same name, and this prescription was added. Make sure you understand how and when to take each.   Used for:  Chronic pain syndrome, Neck pain        Dose:  30 mg   Start taking on:  12/23/2017   Take 1 tablet (30 mg) by mouth 4 times daily In combination with percocet.   Quantity:  60 tablet   Refills:  0        * Notice:  This list has 2 medication(s) that are the same as other medications prescribed for you. Read the directions carefully, and ask your doctor or other care provider to review them with you.         Where to get your medicines      Some of these will need a paper prescription and others can be bought over the counter.  Ask your nurse if you have questions.     Bring a paper prescription for each of these medications     adapalene 0.1 % gel    oxyCODONE IR 30 MG tablet    PERCOCET  MG per tablet    testosterone cypionate 100 MG/ML Soln inj                Primary Care Provider Office Phone # Fax #    Tonie Zach Cui [phone] [address] [facility] [address]        Equal Access to Services     LEONIE LIU : Hadii bishop Lopez, waaxda vani, qaybta kaalmada elder, jose angel yarbrough. So [facility] [phone].    ATENCIÓN: Si habla español, tiene a ceron disposición servicios gratuitos de asistencia lingüística. Llame al [phone].    We comply with applicable federal civil rights laws and [place] laws. We do not discriminate on the basis of race, color, national origin, age, disability, sex, sexual orientation, or gender identity.            Thank you!     Thank you for choosing [facility]  for your care. Our goal is always to provide you with excellent care. Hearing back from our patients is one way we can continue to improve our services. Please take a few minutes to complete the written survey that you may receive in the mail after your visit with us. Thank you!             Your Updated Medication List - Protect others around you: Learn how to safely use, store and throw away your medicines at www.disposemymeds.org.          This list is accurate as of: 12/5/17  6:08 PM.  Always use your most recent med list.                   Brand Name Dispense Instructions for use Diagnosis    adapalene 0.1 % gel     DIFFERIN    45 g    Apply topically At Bedtime    Acne vulgaris       clonazePAM 1 MG tablet    klonoPIN    180 tablet    TAKE 1-2 TABLETS BY MOUTH THREE TIMES DAILY AS NEEDED FOR ANXIETY    Anxiety       diazepam 10 MG tablet    VALIUM    60 tablet    TAKE 1 TABLET BY MOUTH TWICE DAILY AS NEEDED FOR SLEEP    Chronic pain syndrome, Neck pain       finasteride 5 MG tablet    PROSCAR    30 tablet    Take 1 tablet (5 mg) by mouth daily    Male pattern baldness       mupirocin 2 % ointment    BACTROBAN    22 g    Apply topically 3 times daily    Facial cellulitis       omeprazole 20 MG tablet    priLOSEC OTC    90 tablet    Take 1 tablet (20 mg) by mouth daily    Gastroesophageal reflux disease without esophagitis       order for DME     1 Bottle    Actavis testosterone gel 1% 50 mg daily to intact skin    Male hypogonadism       * oxyCODONE IR 10 MG tablet    ROXICODONE    60 tablet    Take 1-2 tablets (10-20 mg) by mouth 3 times daily as needed for breakthrough pain    Chronic pain syndrome, Neck pain       * oxyCODONE IR 30 MG tablet   Start taking on:  12/23/2017    ROXICODONE    60 tablet    Take 1 tablet (30 mg) by mouth 4 times daily In combination with percocet.    Chronic pain syndrome, Neck pain       PERCOCET  MG per tablet   Generic drug:  oxyCODONE-acetaminophen   Start taking on:  12/23/2017     120 tablet    Take 2 tablets by mouth 4 times daily In combination with oxy IR.    Chronic pain syndrome, Neck pain       polyethylene glycol powder    MIRALAX/GLYCOLAX    527 g    STIR 17 GM OF POWDER (SEE MINGO INSIDE CAP) IN 8-OZ OF LIQUID UNTIL COMPLETELY DISSOLVED. DRINK THE SOLUTION DAILY OR AS DIRECTED.    Chronic constipation       testosterone cypionate 100 MG/ML Soln inj     3 mL    Inject 0.5 mLs (50 mg) into the muscle every 14 days    Hypogonadism male       tretinoin 0.1 % cream    RETIN-A    45 g    Apply topically At Bedtime    Acne vulgaris       * Notice:  This list has 2 medication(s) that  are the same as other medications prescribed for you. Read the directions carefully, and ask your doctor or other care provider to review them with you.

## 2017-12-14 DIAGNOSIS — M54.2 NECK PAIN: ICD-10-CM

## 2017-12-14 DIAGNOSIS — G89.4 CHRONIC PAIN SYNDROME: ICD-10-CM

## 2017-12-14 RX ORDER — DIAZEPAM 10 MG
TABLET ORAL
Qty: 60 TABLET | Refills: 0 | Status: SHIPPED | OUTPATIENT
Start: 2017-12-14 | End: 2018-01-02

## 2017-12-14 NOTE — TELEPHONE ENCOUNTER
diazepam (VALIUM) 10 MG tablet      Last Written Prescription Date:  11/4/17  Last Fill Quantity: 60,   # refills: 0  Last Office Visit: 12/5/17  Future Office visit:       Routing refill request to provider for review/approval because:  Drug not on the [facility], [facility] or [facility] refill protocol or controlled substance

## 2018-01-02 ENCOUNTER — TELEPHONE (OUTPATIENT)
Dept: FAMILY MEDICINE | Facility: CLINIC | Age: 48
End: 2018-01-02

## 2018-01-02 DIAGNOSIS — M54.2 NECK PAIN: ICD-10-CM

## 2018-01-02 DIAGNOSIS — G89.4 CHRONIC PAIN SYNDROME: ICD-10-CM

## 2018-01-02 RX ORDER — OXYCODONE HYDROCHLORIDE AND ACETAMINOPHEN 10; 325 MG/1; MG/1
2 TABLET ORAL 4 TIMES DAILY
Qty: 120 TABLET | Refills: 0 | Status: SHIPPED | OUTPATIENT
Start: 2018-01-19 | End: 2018-01-24

## 2018-01-02 RX ORDER — DIAZEPAM 10 MG
TABLET ORAL
Qty: 60 TABLET | Refills: 0 | Status: SHIPPED | OUTPATIENT
Start: 2018-01-12 | End: 2018-02-05

## 2018-01-02 RX ORDER — OXYCODONE HYDROCHLORIDE 30 MG/1
30 TABLET ORAL 4 TIMES DAILY
Qty: 60 TABLET | Refills: 0 | Status: SHIPPED | OUTPATIENT
Start: 2018-01-05 | End: 2018-01-02

## 2018-01-02 RX ORDER — OXYCODONE HYDROCHLORIDE 30 MG/1
30 TABLET ORAL 4 TIMES DAILY
Qty: 60 TABLET | Refills: 0 | Status: SHIPPED | OUTPATIENT
Start: 2018-01-19 | End: 2018-01-24

## 2018-01-02 RX ORDER — OXYCODONE HYDROCHLORIDE AND ACETAMINOPHEN 10; 325 MG/1; MG/1
2 TABLET ORAL 4 TIMES DAILY
Qty: 120 TABLET | Refills: 0 | Status: SHIPPED | OUTPATIENT
Start: 2018-01-05 | End: 2018-01-02

## 2018-01-02 NOTE — TELEPHONE ENCOUNTER
Requested Prescriptions   Pending Prescriptions Disp Refills     PERCOCET  MG per tablet 120 tablet 0     Sig: Take 2 tablets by mouth 4 times daily In combination with oxy IR.    There is no refill protocol information for this order   Last fill 12/23/17 for 120     oxyCODONE IR (ROXICODONE) 30 MG tablet 60 tablet 0     Sig: Take 1 tablet (30 mg) by mouth 4 times daily In combination with percocet.    There is no refill protocol information for this order   Last fill: 12/23/17 for 60     diazepam (VALIUM) 10 MG tablet 60 tablet 0     Sig: TAKE 1 TABLET BY MOUTH TWICE DAILY AS NEEDED FOR SLEEP    There is no refill protocol information for this order   Last fill: 12/14/17 for 60     Cassandra Montalvo RN, [facility] Triage

## 2018-01-02 NOTE — TELEPHONE ENCOUNTER
Reason for Call:  Medication or medication refill:    Do you use a [facility]?  Name of the pharmacy and phone number for the current request:  Written rx     Name of the medication requested: Percocet 10-325mg , Diazapam 10 mg , Oxycodone 30 mg IR    Other request: Patient called asking us to his mom Evette Gonzáles to pickup my medications and call me when approved so we can .    Can we leave a detailed message on this number? YES    Phone number patient can be reached at: Cell number on file:    Telephone Information:   Mobile [phone]     Best Time: any    Call taken on 1/2/2018 at 8:40 AM by Phoebe Corley

## 2018-01-24 ENCOUNTER — VIRTUAL VISIT (OUTPATIENT)
Dept: FAMILY MEDICINE | Facility: CLINIC | Age: 48
End: 2018-01-24
Payer: COMMERCIAL

## 2018-01-24 DIAGNOSIS — G89.4 CHRONIC PAIN SYNDROME: Primary | ICD-10-CM

## 2018-01-24 DIAGNOSIS — M54.2 NECK PAIN: ICD-10-CM

## 2018-01-24 DIAGNOSIS — E29.1 MALE HYPOGONADISM: ICD-10-CM

## 2018-01-24 PROCEDURE — 99441 ZZC PHYSICIAN TELEPHONE EVALUATION 5-10 MIN: CPT | Performed by: FAMILY MEDICINE

## 2018-01-24 RX ORDER — OXYCODONE HYDROCHLORIDE 30 MG/1
30 TABLET ORAL 4 TIMES DAILY
Qty: 60 TABLET | Refills: 0 | Status: SHIPPED | OUTPATIENT
Start: 2018-02-02 | End: 2018-02-07

## 2018-01-24 RX ORDER — OXYCODONE HYDROCHLORIDE AND ACETAMINOPHEN 10; 325 MG/1; MG/1
2 TABLET ORAL 4 TIMES DAILY
Qty: 120 TABLET | Refills: 0 | Status: SHIPPED | OUTPATIENT
Start: 2018-02-02 | End: 2018-02-07

## 2018-01-24 NOTE — MR AVS SNAPSHOT
After Visit Summary   1/24/2018    Jose Luis Christoph Gonzáles    MRN: [id number]           Patient Information     Date Of Birth          1970        Visit Information        Provider Department      1/24/2018 3:40 PM Tonie Cui MD [facility]        Today's Diagnoses     Chronic pain syndrome    -  1    Neck pain        Male hypogonadism           Follow-ups after your visit        Follow-up notes from your care team     Return in about 1 month (around 2/24/2018).      Who to contact     If you have questions or need follow up information about today's clinic visit or your schedule please contact [facility] directly at [phone].  Normal or non-critical lab and imaging results will be communicated to you by MyChart, letter or phone within 4 business days after the clinic has received the results. If you do not hear from us within 7 days, please contact the clinic through [facility] or phone. If you have a critical or abnormal lab result, we will notify you by phone as soon as possible.  Submit refill requests through [facility] or call your pharmacy and they will forward the refill request to us. Please allow 3 business days for your refill to be completed.          Additional Information About Your Visit        MyChart Information     [facility] gives you secure access to your electronic health record. If you see a primary care provider, you can also send messages to your care team and make appointments. If you have questions, please call your primary care clinic.  If you do not have a primary care provider, please call [phone] and they will assist you.        Care EveryWhere ID     This is your Care EveryWhere ID. This could be used by other organizations to access your [facility] medical records  [id number]         Blood Pressure from Last 3 Encounters:   11/14/17 122/80   08/15/17 126/75   03/24/17 136/86    Weight from Last 3 Encounters:   11/14/17 79.9 kg  (176 lb 3.2 oz)   08/15/17 76.2 kg (167 lb 14.4 oz)   03/24/17 77.4 kg (170 lb 11.2 oz)              Today, you had the following     No orders found for display         Where to get your medicines      Some of these will need a paper prescription and others can be bought over the counter.  Ask your nurse if you have questions.     Bring a paper prescription for each of these medications     oxyCODONE IR 30 MG tablet    PERCOCET  MG per tablet          Primary Care Provider Office Phone # Fax #    Tonie Zach Cui [phone] [address] [facility] [address]        Equal Access to Services     LEONIE LIU : Hadii bishop simono Socalli, waaxda lueric, qaybta kaalmada adeoskar, jose angel yarbrough. So [facility] [phone].    ATENCIÓN: Si habla español, tiene a ceron disposición servicios gratuitos de asistencia lingüística. Llame al [phone].    We comply with applicable federal civil rights laws and [place] laws. We do not discriminate on the basis of race, color, national origin, age, disability, sex, sexual orientation, or gender identity.            Thank you!     Thank you for choosing [facility]  for your care. Our goal is always to provide you with excellent care. Hearing back from our patients is one way we can continue to improve our services. Please take a few minutes to complete the written survey that you may receive in the mail after your visit with us. Thank you!             Your Updated Medication List - Protect others around you: Learn how to safely use, store and throw away your medicines at www.disposemymeds.org.          This list is accurate as of 1/24/18  3:55 PM.  Always use your most recent med list.                   Brand Name Dispense Instructions for use Diagnosis    adapalene 0.1 % gel    DIFFERIN    45 g    Apply topically At Bedtime    Acne vulgaris       clonazePAM 1 MG tablet    klonoPIN    180  tablet    TAKE 1-2 TABLETS BY MOUTH THREE TIMES DAILY AS NEEDED FOR ANXIETY    Anxiety       diazepam 10 MG tablet    VALIUM    60 tablet    TAKE 1 TABLET BY MOUTH TWICE DAILY AS NEEDED FOR SLEEP    Chronic pain syndrome, Neck pain       finasteride 5 MG tablet    PROSCAR    30 tablet    Take 1 tablet (5 mg) by mouth daily    Male pattern baldness       mupirocin 2 % ointment    BACTROBAN    22 g    Apply topically 3 times daily    Facial cellulitis       omeprazole 20 MG tablet    priLOSEC OTC    90 tablet    Take 1 tablet (20 mg) by mouth daily    Gastroesophageal reflux disease without esophagitis       order for DME     1 Bottle    Actavis testosterone gel 1% 50 mg daily to intact skin    Male hypogonadism       oxyCODONE IR 30 MG tablet   Start taking on:  2/2/2018    ROXICODONE    60 tablet    Take 1 tablet (30 mg) by mouth 4 times daily In combination with percocet.    Chronic pain syndrome, Neck pain       PERCOCET  MG per tablet   Generic drug:  oxyCODONE-acetaminophen   Start taking on:  2/2/2018     120 tablet    Take 2 tablets by mouth 4 times daily In combination with oxy IR.    Chronic pain syndrome, Neck pain       polyethylene glycol powder    MIRALAX/GLYCOLAX    527 g    STIR 17 GM OF POWDER (SEE MINGO INSIDE CAP) IN 8-OZ OF LIQUID UNTIL COMPLETELY DISSOLVED. DRINK THE SOLUTION DAILY OR AS DIRECTED.    Chronic constipation       tretinoin 0.1 % cream    RETIN-A    45 g    Apply topically At Bedtime    Acne vulgaris

## 2018-01-25 NOTE — TELEPHONE ENCOUNTER
Hopefully it can be a very expedited PA   We could always bridge the gap with something that is covered. Does he know if short acting morphine will be covered?   (4) rarely moist

## 2018-02-05 ENCOUNTER — TELEPHONE (OUTPATIENT)
Dept: FAMILY MEDICINE | Facility: CLINIC | Age: 48
End: 2018-02-05

## 2018-02-05 DIAGNOSIS — M54.2 NECK PAIN: ICD-10-CM

## 2018-02-05 DIAGNOSIS — G89.4 CHRONIC PAIN SYNDROME: ICD-10-CM

## 2018-02-05 RX ORDER — OXYCODONE HYDROCHLORIDE 30 MG/1
30 TABLET ORAL 4 TIMES DAILY
Qty: 60 TABLET | Refills: 0 | Status: CANCELLED | OUTPATIENT
Start: 2018-02-05

## 2018-02-05 RX ORDER — OXYCODONE HYDROCHLORIDE AND ACETAMINOPHEN 10; 325 MG/1; MG/1
2 TABLET ORAL 4 TIMES DAILY
Qty: 120 TABLET | Refills: 0 | Status: CANCELLED | OUTPATIENT
Start: 2018-02-05

## 2018-02-05 NOTE — TELEPHONE ENCOUNTER
Requested Prescriptions   Pending Prescriptions Disp Refills     oxyCODONE IR (ROXICODONE) 30 MG tablet 60 tablet 0     Sig: Take 1 tablet (30 mg) by mouth 4 times daily In combination with percocet.    There is no refill protocol information for this order        diazepam (VALIUM) 10 MG tablet 60 tablet 0     Sig: TAKE 1 TABLET BY MOUTH TWICE DAILY AS NEEDED FOR SLEEP    There is no refill protocol information for this order        PERCOCET  MG per tablet 120 tablet 0     Sig: Take 2 tablets by mouth 4 times daily In combination with oxy IR.    There is no refill protocol information for this order        Routing refill request to provider for review/approval because:  Drug not on the [facility] [place]     Lian Rodriguez RN, BSN

## 2018-02-05 NOTE — TELEPHONE ENCOUNTER
Appears rx for percocet and oxycodone IR were printed 2/2/18 (are these at the front or were they already picked up?)    Too early for valium refill

## 2018-02-05 NOTE — TELEPHONE ENCOUNTER
Reason for Call:  Medication or medication refill:    Do you use a [facility]?  Name of the pharmacy and phone number for the current request:  WRITTEN PRESCRIPTION REQUESTED    Name of the medication requested: Pending Prescriptions:                       Disp   Refills    oxyCODONE IR (ROXICODONE) 30 MG tablet    60 tab*0            Sig: Take 1 tablet (30 mg) by mouth 4 times daily In           combination with percocet.    diazepam (VALIUM) 10 MG tablet            60 tab*0            Sig: TAKE 1 TABLET BY MOUTH TWICE DAILY AS NEEDED FOR           SLEEP    PERCOCET  MG per tablet             120 ta*0            Sig: Take 2 tablets by mouth 4 times daily In           combination with oxy IR.      Other request: My mom will  the scripts her names is Evette Gonzáles. Please call when available for pickup would like to pick them up on  2/7/2018    Can we leave a detailed message on this number? YES    Phone number patient can be reached at: Cell number on file:    Telephone Information:   Mobile [phone]       Best Time: Any    Call taken on 2/5/2018 at 12:45 PM by Phoebe Corley

## 2018-02-06 ENCOUNTER — TELEPHONE (OUTPATIENT)
Dept: FAMILY MEDICINE | Facility: CLINIC | Age: 48
End: 2018-02-06

## 2018-02-06 NOTE — TELEPHONE ENCOUNTER
Looks like Rxs were placed at  on 1/24/18.  I dont see anything noted about rxs being placed up front on 2/2/18.    Please advise.    Jenny BREEN, Patient Care

## 2018-02-06 NOTE — TELEPHONE ENCOUNTER
rec pcp to review tomorrow when  He is back in office given it's unclear where rx's from 2/2/18 are.

## 2018-02-06 NOTE — TELEPHONE ENCOUNTER
Reason for Call:  Other prescription    Detailed comments: Diazepam is due on 2/10.  Oxycodone and Percocet 2/16 mom is driving down so if possible she would  for me early but I realize they can't be filled early. Please call me when approved.     Phone Number Patient can be reached at: Home number on file [phone] (home)    Best Time: any    Can we leave a detailed message on this number? YES    Call taken on 2/6/2018 at 8:57 AM by Phoebe Corley

## 2018-02-06 NOTE — TELEPHONE ENCOUNTER
Reason for Call:  Other     Detailed comments: Have letter cancel request.    Phone Number Patient can be reached at: Home number on file [phone] (home)    Best Time: any    Can we leave a detailed message on this number? YES    Call taken on 2/6/2018 at 8:55 AM by Phoebe Corley

## 2018-02-06 NOTE — TELEPHONE ENCOUNTER
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    Phone Number Patient can be reached at: Home number on file [phone] (home)    Best Time: anytime    Can we leave a detailed message on this number? YES    Call taken on 2/6/2018 at 8:35 AM by Milo Dickinson

## 2018-02-06 NOTE — TELEPHONE ENCOUNTER
According to [place] [facility] website Prescription for percocet was filled on 2/2/18 and oxycodone was filled on 2/4/18.    Prescription for valium was filled 1/12/18.  Further refills of valium per PCP who will be in the office tomorrow.    No fills outside of this office   Please call and inform patient that prescriptions will need to wait for PCP to review.

## 2018-02-07 RX ORDER — DIAZEPAM 10 MG
TABLET ORAL
Qty: 60 TABLET | Refills: 0 | Status: SHIPPED | OUTPATIENT
Start: 2018-02-10 | End: 2018-02-26

## 2018-02-07 RX ORDER — OXYCODONE HYDROCHLORIDE AND ACETAMINOPHEN 10; 325 MG/1; MG/1
2 TABLET ORAL 4 TIMES DAILY
Qty: 120 TABLET | Refills: 0 | Status: SHIPPED | OUTPATIENT
Start: 2018-02-16 | End: 2018-02-26

## 2018-02-07 RX ORDER — OXYCODONE HYDROCHLORIDE 30 MG/1
30 TABLET ORAL 4 TIMES DAILY
Qty: 60 TABLET | Refills: 0 | Status: SHIPPED | OUTPATIENT
Start: 2018-02-16 | End: 2018-02-26

## 2018-02-07 NOTE — TELEPHONE ENCOUNTER
Reason for Call:  Other prescription    Detailed comments: Pt calling to follow up to remind Dr. Cui to have his Valium and Percocet Rx's written and ready for pickup at the  by today .  He would like to authorize his Mother to come into clinic to  Rx's as soon as possible.      Phone Number Patient can be reached at: Home number on file [phone] (home)    Best Time: anytime    Can we leave a detailed message on this number? YES    Call taken on 2/7/2018 at 8:57 AM by Ashok Forde

## 2018-02-26 ENCOUNTER — OFFICE VISIT (OUTPATIENT)
Dept: FAMILY MEDICINE | Facility: CLINIC | Age: 48
End: 2018-02-26
Payer: COMMERCIAL

## 2018-02-26 VITALS
OXYGEN SATURATION: 94 % | SYSTOLIC BLOOD PRESSURE: 140 MMHG | HEART RATE: 107 BPM | TEMPERATURE: 98.1 F | WEIGHT: 168.1 LBS | BODY MASS INDEX: 24.07 KG/M2 | DIASTOLIC BLOOD PRESSURE: 98 MMHG | HEIGHT: 70 IN | RESPIRATION RATE: 18 BRPM

## 2018-02-26 DIAGNOSIS — K21.9 GASTROESOPHAGEAL REFLUX DISEASE WITHOUT ESOPHAGITIS: ICD-10-CM

## 2018-02-26 DIAGNOSIS — S30.1XXA ABDOMINAL WALL HEMATOMA, INITIAL ENCOUNTER: Primary | ICD-10-CM

## 2018-02-26 DIAGNOSIS — B17.9 ACUTE HEPATITIS: ICD-10-CM

## 2018-02-26 DIAGNOSIS — G89.4 CHRONIC PAIN SYNDROME: ICD-10-CM

## 2018-02-26 DIAGNOSIS — M54.2 NECK PAIN: ICD-10-CM

## 2018-02-26 DIAGNOSIS — K59.09 CHRONIC CONSTIPATION: ICD-10-CM

## 2018-02-26 DIAGNOSIS — L64.9 MALE PATTERN BALDNESS: ICD-10-CM

## 2018-02-26 PROCEDURE — 99214 OFFICE O/P EST MOD 30 MIN: CPT | Performed by: FAMILY MEDICINE

## 2018-02-26 RX ORDER — OXYCODONE HYDROCHLORIDE 30 MG/1
30 TABLET ORAL 4 TIMES DAILY
Qty: 60 TABLET | Refills: 0 | Status: SHIPPED | OUTPATIENT
Start: 2018-03-02 | End: 2018-02-26

## 2018-02-26 RX ORDER — POLYETHYLENE GLYCOL 3350 17 G/17G
POWDER, FOR SOLUTION ORAL
Qty: 527 G | Refills: 10 | Status: SHIPPED | OUTPATIENT
Start: 2018-02-26 | End: 2018-11-24

## 2018-02-26 RX ORDER — OXYCODONE HYDROCHLORIDE AND ACETAMINOPHEN 10; 325 MG/1; MG/1
2 TABLET ORAL 4 TIMES DAILY
Qty: 120 TABLET | Refills: 0 | Status: SHIPPED | OUTPATIENT
Start: 2018-03-02 | End: 2018-02-26

## 2018-02-26 RX ORDER — DIAZEPAM 10 MG
TABLET ORAL
Qty: 60 TABLET | Refills: 0 | Status: SHIPPED | OUTPATIENT
Start: 2018-03-09 | End: 2018-04-04

## 2018-02-26 RX ORDER — OXYCODONE HYDROCHLORIDE AND ACETAMINOPHEN 10; 325 MG/1; MG/1
2 TABLET ORAL 4 TIMES DAILY
Qty: 120 TABLET | Refills: 0 | Status: SHIPPED | OUTPATIENT
Start: 2018-03-16 | End: 2018-03-26

## 2018-02-26 RX ORDER — OXYCODONE HYDROCHLORIDE 30 MG/1
30 TABLET ORAL 4 TIMES DAILY
Qty: 60 TABLET | Refills: 0 | Status: SHIPPED | OUTPATIENT
Start: 2018-03-16 | End: 2018-03-26

## 2018-02-26 RX ORDER — OMEPRAZOLE 20 MG/1
20 TABLET, DELAYED RELEASE ORAL DAILY
Qty: 90 TABLET | Refills: 3 | Status: SHIPPED | OUTPATIENT
Start: 2018-02-26 | End: 2019-04-23

## 2018-02-26 RX ORDER — TRETINOIN 1 MG/G
CREAM TOPICAL AT BEDTIME
Qty: 45 G | Refills: 1 | Status: CANCELLED | OUTPATIENT
Start: 2018-02-26

## 2018-02-26 RX ORDER — FINASTERIDE 5 MG/1
5 TABLET, FILM COATED ORAL DAILY
Qty: 30 TABLET | Refills: 5 | Status: SHIPPED | OUTPATIENT
Start: 2018-02-26 | End: 2019-01-02

## 2018-02-26 NOTE — MR AVS SNAPSHOT
After Visit Summary   2/26/2018    Jose Luis Christoph Gonzáles    MRN: [id number]           Patient Information     Date Of Birth          1970        Visit Information        Provider Department      2/26/2018 11:00 AM Tonie Cui MD [facility]        Today's Diagnoses     Abdominal wall hematoma, initial encounter    -  1    Chronic constipation        Acute hepatitis        Chronic pain syndrome        Gastroesophageal reflux disease without esophagitis        Male pattern baldness        Neck pain           Follow-ups after your visit        Follow-up notes from your care team     Return in about 1 month (around 3/26/2018).      Who to contact     If you have questions or need follow up information about today's clinic visit or your schedule please contact [facility] directly at [phone].  Normal or non-critical lab and imaging results will be communicated to you by [facility], letter or phone within 4 business days after the clinic has received the results. If you do not hear from us within 7 days, please contact the clinic through [facility] or phone. If you have a critical or abnormal lab result, we will notify you by phone as soon as possible.  Submit refill requests through [facility] or call your pharmacy and they will forward the refill request to us. Please allow 3 business days for your refill to be completed.          Additional Information About Your Visit        MyChart Information     [facility] gives you secure access to your electronic health record. If you see a primary care provider, you can also send messages to your care team and make appointments. If you have questions, please call your primary care clinic.  If you do not have a primary care provider, please call [phone] and they will assist you.        Care EveryWhere ID     This is your Care EveryWhere ID. This could be used by other organizations to access your [facility]  "records  [id number]        Your Vitals Were     Pulse Temperature Respirations Height Pulse Oximetry BMI (Body Mass Index)    107 98.1  F (36.7  C) (Oral) 18 1.778 m (5' 10\") 94% 24.12 kg/m2       Blood Pressure from Last 3 Encounters:   02/26/18 (!) 140/98   11/14/17 122/80   08/15/17 126/75    Weight from Last 3 Encounters:   02/26/18 76.2 kg (168 lb 1.6 oz)   11/14/17 79.9 kg (176 lb 3.2 oz)   08/15/17 76.2 kg (167 lb 14.4 oz)              Today, you had the following     No orders found for display         Today's Medication Changes          These changes are accurate as of 2/26/18 12:17 PM.  If you have any questions, ask your nurse or doctor.               Start taking these medicines.        Dose/Directions    oxyCODONE IR 30 MG tablet   Commonly known as:  ROXICODONE   Used for:  Chronic pain syndrome, Neck pain   Started by:  Tonie Cui MD        Dose:  30 mg   Start taking on:  3/16/2018   Take 1 tablet (30 mg) by mouth 4 times daily In combination with percocet.   Quantity:  60 tablet   Refills:  0       PERCOCET  MG per tablet   Used for:  Chronic pain syndrome, Neck pain   Generic drug:  oxyCODONE-acetaminophen   Started by:  Tonie Cui MD        Dose:  2 tablet   Start taking on:  3/16/2018   Take 2 tablets by mouth 4 times daily In combination with oxy IR.   Quantity:  120 tablet   Refills:  0            Where to get your medicines      Some of these will need a paper prescription and others can be bought over the counter.  Ask your nurse if you have questions.     Bring a paper prescription for each of these medications     diazepam 10 MG tablet    finasteride 5 MG tablet    omeprazole 20 MG tablet    oxyCODONE IR 30 MG tablet    PERCOCET  MG per tablet    polyethylene glycol powder                Primary Care Provider Office Phone # Fax #    Tonie Cui [phone] [address] [facility] [address]        Equal " Access to Services     [facility]: Hadii bishop jaramillo jus Lopez, waaxda luqadaha, qaybta kaalanahi lobitochiragjose angel caballero. So [facility] [phone].    ATENCIÓN: Si habla espkelly, tiene a ceron disposición servicios gratuitos de asistencia lingüística. Llame al [phone].    We comply with applicable federal civil rights laws and [place] laws. We do not discriminate on the basis of race, color, national origin, age, disability, sex, sexual orientation, or gender identity.            Thank you!     Thank you for choosing [facility]  for your care. Our goal is always to provide you with excellent care. Hearing back from our patients is one way we can continue to improve our services. Please take a few minutes to complete the written survey that you may receive in the mail after your visit with us. Thank you!             Your Updated Medication List - Protect others around you: Learn how to safely use, store and throw away your medicines at www.disposemymeds.org.          This list is accurate as of 2/26/18 12:17 PM.  Always use your most recent med list.                   Brand Name Dispense Instructions for use Diagnosis    adapalene 0.1 % gel    DIFFERIN    45 g    Apply topically At Bedtime    Acne vulgaris       clonazePAM 1 MG tablet    klonoPIN    180 tablet    TAKE 1-2 TABLETS BY MOUTH THREE TIMES DAILY AS NEEDED FOR ANXIETY    Anxiety       diazepam 10 MG tablet   Start taking on:  3/9/2018    VALIUM    60 tablet    TAKE 1 TABLET BY MOUTH TWICE DAILY AS NEEDED FOR SLEEP    Chronic pain syndrome, Neck pain       finasteride 5 MG tablet    PROSCAR    30 tablet    Take 1 tablet (5 mg) by mouth daily    Male pattern baldness       mupirocin 2 % ointment    BACTROBAN    22 g    Apply topically 3 times daily    Facial cellulitis       omeprazole 20 MG tablet    priLOSEC OTC    90 tablet    Take 1 tablet (20 mg) by mouth daily    Gastroesophageal reflux disease without  esophagitis       order for DME     1 Bottle    Actavis testosterone gel 1% 50 mg daily to intact skin    Male hypogonadism       oxyCODONE IR 30 MG tablet   Start taking on:  3/16/2018    ROXICODONE    60 tablet    Take 1 tablet (30 mg) by mouth 4 times daily In combination with percocet.    Chronic pain syndrome, Neck pain       PERCOCET  MG per tablet   Generic drug:  oxyCODONE-acetaminophen   Start taking on:  3/16/2018     120 tablet    Take 2 tablets by mouth 4 times daily In combination with oxy IR.    Chronic pain syndrome, Neck pain       polyethylene glycol powder    MIRALAX/GLYCOLAX    527 g    STIR 17 GM OF POWDER (SEE MINGO INSIDE CAP) IN 8-OZ OF LIQUID UNTIL COMPLETELY DISSOLVED. DRINK THE SOLUTION DAILY OR AS DIRECTED.    Chronic constipation       tretinoin 0.1 % cream    RETIN-A    45 g    Apply topically At Bedtime    Acne vulgaris

## 2018-02-26 NOTE — NURSING NOTE
"Chief Complaint   Patient presents with     Pain       Initial BP (!) 140/98 (BP Location: Right arm, Patient Position: Sitting, Cuff Size: Adult Regular)  Pulse 107  Temp 98.1  F (36.7  C) (Oral)  Resp 18  Ht 1.778 m (5' 10\")  Wt 76.2 kg (168 lb 1.6 oz)  SpO2 94%  BMI 24.12 kg/m2 Estimated body mass index is 24.12 kg/(m^2) as calculated from the following:    Height as of this encounter: 1.778 m (5' 10\").    Weight as of this encounter: 76.2 kg (168 lb 1.6 oz).  Medication Reconciliation: emili Disla        "

## 2018-02-26 NOTE — PROGRESS NOTES
SUBJECTIVE:   Jose Luis Gonzáles is a 47 year old male who presents to clinic today for the following health issues:      Chronic Pain Follow-Up       Type / Location of Pain: RT side ribs and LT foot  Analgesia/pain control:       Recent changes:  worse      Overall control: Inadequate pain control  Activity level/function:      Daily activities:  Able to do light housework, cooking    Work:  Unable to work  Adverse effects:  No  Adherance    Taking medication as directed?  Yes    Participating in other treatments: no -   Risk Factors:    Sleep:  Poor    Mood/anxiety:  controlled    Recent family or social stressors:  none noted    Other aggravating factors: none  PHQ-9 SCORE 9/26/2016 2/28/2017 11/14/2017   Total Score - - -   Total Score 8 13 8     ODALYS-7 SCORE 9/26/2016 2/28/2017 11/14/2017   Total Score - - -   Total Score 12 20 10     Encounter-Level CSA - 12/12/2016:          Controlled Substance Agreement - Scan on 12/13/2016 10:02 AM : CONTROLLED SUBSTANCE AGREEMENT 12/12/16 (below)                Amount of exercise or physical activity: None    Problems taking medications regularly: No    Medication side effects: none    Diet: regular (no restrictions)    SUBJECTIVE:  Here today primarily in follow-up of his chronic neck pain.  From that standpoint things are about the same.  Still using medications as prescribed without side effects other than constipation.  Uses MiraLAX to help control that.  He had met with Dr. Melendez they had discussed setting up a colonoscopy for routine screening and to evaluate his constipation.  Has not yet set that up.  But about a month ago he had a bout of acute pancreatitis and hepatitis -full records reviewed with patient and through care everywhere ([place]).  Per the emergency department there classifying this as alcoholic hepatitis and pancreatitis.  The patient reports that he did not have more than 1 or 2 drinks that night and he thinks there is more of an issue of the  "gallbladder causing this.  He has a CT scan from 2017 which shows mild distention of the gallbladder without any discernible stones.  I discussed the concept of gallbladder sludging with the patient.  Discussed that we could further evaluate this with lab tests, functional studies, etc.  He wants to bounce that all of this off Dr. Melendez and I think that would be reasonable.  We discussed the direct toxicity that alcohol presents to the liver and pancreas.  The patient says he is not a routine drinker but he certainly has a history of substance abuse in the past.  Fell about 3 weeks ago -slipped on the bathroom floor and struck his right side against the toilet.  Bruised ribs and has a resolving hematoma right lower abdomen.    Review of systems otherwise negative.  Past medical, family, and social history reviewed and updated in chart.    OBJECTIVE:  BP (!) 140/98 (BP Location: Right arm, Patient Position: Sitting, Cuff Size: Adult Regular)  Pulse 107  Temp 98.1  F (36.7  C) (Oral)  Resp 18  Ht 1.778 m (5' 10\")  Wt 76.2 kg (168 lb 1.6 oz)  SpO2 94%  BMI 24.12 kg/m2  Alert, pleasant, upbeat, and in no apparent discomfort.  S1 and S2 normal, no murmurs, clicks, gallops or rubs. Regular rate and rhythm. Chest is clear; no wheezes or rales. No edema or JVD.  Chest wall  - bruised and mildly tender to palpation right side  Abdomen -firm resolving hematoma right lower abdomen  Past labs reviewed with the patient.     ASSESSMENT / PLAN:  (S30.1XXA) Abdominal wall hematoma, initial encounter  (primary encounter diagnosis)  Comment: Discussed using some heat and giving this time.  No other treatment needed at this point  Plan:     (K59.09) Chronic constipation  Comment: Ongoing issue and I do think given his other medical issues that colonoscopy would be a good idea to set up with Dr. Melendez   Plan: polyethylene glycol (MIRALAX/GLYCOLAX) powder            (B17.9) Acute hepatitis  Comment: Questionable whether this is " alcohol or possibly a gallbladder issue.  He again will discuss this with Dr. Melendez   Plan:     (G89.4) Chronic pain syndrome  Comment: Stable on current dosage.  Refilled and following  Plan: diazepam (VALIUM) 10 MG tablet, oxyCODONE IR         (ROXICODONE) 30 MG tablet, PERCOCET  MG         per tablet, DISCONTINUED: oxyCODONE IR         (ROXICODONE) 30 MG tablet, DISCONTINUED:         PERCOCET  MG per tablet            (K21.9) Gastroesophageal reflux disease without esophagitis  Comment:   Plan: omeprazole (PRILOSEC OTC) 20 MG tablet            (L64.9) Male pattern baldness  Comment:   Plan: finasteride (PROSCAR) 5 MG tablet            (M54.2) Neck pain  Comment:   Plan: diazepam (VALIUM) 10 MG tablet, oxyCODONE IR         (ROXICODONE) 30 MG tablet, PERCOCET  MG         per tablet, DISCONTINUED: oxyCODONE IR         (ROXICODONE) 30 MG tablet, DISCONTINUED:         PERCOCET  MG per tablet            Follow up 1 month   JANET Cui MD    (Chart documentation completed in part with Dragon voice-recognition software.  Even though reviewed some grammatical, spelling, and word errors may remain.)

## 2018-03-12 ENCOUNTER — TELEPHONE (OUTPATIENT)
Dept: FAMILY MEDICINE | Facility: CLINIC | Age: 48
End: 2018-03-12

## 2018-03-12 NOTE — TELEPHONE ENCOUNTER
Reason for Call:  Other Letter    Detailed comments: Dr. Cui had written a work absence letter that Pt would like for Dr. Cui to change the dates. He would like a call back to discuss further as it pertains to his disability .    Phone Number Patient can be reached at: Home number on file [phone] (home)    Best Time: anytime    Can we leave a detailed message on this number? YES    Call taken on 3/12/2018 at 1:16 PM by Ashok Forde

## 2018-03-12 NOTE — TELEPHONE ENCOUNTER
Spoke with Pt and he says he was approved for social security.    He says the letter was from 2014. Needs the year changed to 2018. Pt would like letter to  [phone]    Ashley Disla MA

## 2018-03-12 NOTE — LETTER
March 21, 2018        Jose Luis Gonzáles  [address] [place] [address]          To whom it may concern:    RE: Jose Luis Gonzáles    I have been the primary physician for Mr. Jose Luis Gonzáles for almost 10 years.   He suffers from a variety of chronic medical conditions, and I do not feel he is currently capable of substantial employment, and expect this to continue for the foreseeable future.      Please contact me for questions or concerns.      Sincerely,        Tonie Cui MD

## 2018-03-15 NOTE — TELEPHONE ENCOUNTER
Wait, is this a letter verifying that he is on SSI disability?  Or a note stating I don't think he can work?

## 2018-03-16 NOTE — TELEPHONE ENCOUNTER
This writer attempted to contact pt on 03/16/18      Reason for call please verify if pt is needing letter for SSI Disability or for missing work. and left message.      If patient calls back:   Relay message - verify and route back to team, (read verbatim), document that pt called and close encounter        Christoph Berman MA

## 2018-03-21 NOTE — TELEPHONE ENCOUNTER
This writer attempted to contact pt on 03/21/18      Reason for call find out is pt wants letter mailed or if her will  and left message.      If patient calls back:   find out if pt wants letter mailed or if her will          Jenny Ambrose MA

## 2018-03-26 DIAGNOSIS — M54.2 NECK PAIN: ICD-10-CM

## 2018-03-26 DIAGNOSIS — G89.4 CHRONIC PAIN SYNDROME: ICD-10-CM

## 2018-03-26 NOTE — TELEPHONE ENCOUNTER
...Reason for Call: prescription    Detailed comments: Patient called said he need a refill on PERCOCET and OXYCODONE.     Phone Number Patient can be reached at: Home number on file [phone] (home)    Best Time: anytime    Can we leave a detailed message on this number? YES    Call taken on 3/26/2018 at 9:00 AM by Milo Dickinson

## 2018-03-27 RX ORDER — OXYCODONE HYDROCHLORIDE 30 MG/1
30 TABLET ORAL 4 TIMES DAILY
Qty: 60 TABLET | Refills: 0 | Status: SHIPPED | OUTPATIENT
Start: 2018-03-30 | End: 2018-04-04

## 2018-03-27 RX ORDER — OXYCODONE HYDROCHLORIDE AND ACETAMINOPHEN 10; 325 MG/1; MG/1
2 TABLET ORAL 4 TIMES DAILY
Qty: 120 TABLET | Refills: 0 | Status: SHIPPED | OUTPATIENT
Start: 2018-03-30 | End: 2018-04-04

## 2018-03-27 NOTE — TELEPHONE ENCOUNTER
Requested Prescriptions   Pending Prescriptions Disp Refills     oxyCODONE IR (ROXICODONE) 30 MG tablet      Last Written Prescription Date:  03/16/18  Last Fill Quantity: 60 tablet,   # refills: 0  Last Office Visit: 02/26/18 Dr. Cui  Future Office visit:       Routing refill request to provider for review/approval because:  Drug not on the [facility] or  Health refill protocol or controlled substance 60 tablet 0     Sig: Take 1 tablet (30 mg) by mouth 4 times daily In combination with percocet.    There is no refill protocol information for this order            PERCOCET  MG per tablet      Last Written Prescription Date:  03/16/18  Last Fill Quantity: 120 tablet,   # refills: 0  Last Office Visit: 02/26/18 Dr. Cui  Future Office visit:       Routing refill request to provider for review/approval because:  Drug not on the [facility] or [facility] refill protocol or controlled substance 120 tablet 0     Sig: Take 2 tablets by mouth 4 times daily In combination with oxy IR.    There is no refill protocol information for this order

## 2018-03-27 NOTE — TELEPHONE ENCOUNTER
...Reason for Call:   checking on Rx status    Detailed comments: please call when this is ready at the , Mom is picking up - Evette Gonzáles    Phone Number Patient can be reached at: Home number on file [phone] (home)    Best Time: anytime    Can we leave a detailed message on this number? YES    Call taken on 3/27/2018 at 8:25 AM by Clara Krueger

## 2018-04-04 ENCOUNTER — VIRTUAL VISIT (OUTPATIENT)
Dept: FAMILY MEDICINE | Facility: CLINIC | Age: 48
End: 2018-04-04
Payer: COMMERCIAL

## 2018-04-04 ENCOUNTER — TELEPHONE (OUTPATIENT)
Dept: FAMILY MEDICINE | Facility: CLINIC | Age: 48
End: 2018-04-04

## 2018-04-04 DIAGNOSIS — G89.4 CHRONIC PAIN SYNDROME: ICD-10-CM

## 2018-04-04 DIAGNOSIS — F41.9 ANXIETY: ICD-10-CM

## 2018-04-04 DIAGNOSIS — G89.4 CHRONIC PAIN SYNDROME: Primary | ICD-10-CM

## 2018-04-04 DIAGNOSIS — M54.2 NECK PAIN: ICD-10-CM

## 2018-04-04 PROCEDURE — 99441 ZZC PHYSICIAN TELEPHONE EVALUATION 5-10 MIN: CPT | Performed by: FAMILY MEDICINE

## 2018-04-04 RX ORDER — OXYCODONE HYDROCHLORIDE 30 MG/1
30 TABLET ORAL 4 TIMES DAILY
Qty: 60 TABLET | Refills: 0 | Status: SHIPPED | OUTPATIENT
Start: 2018-04-13 | End: 2018-04-25

## 2018-04-04 RX ORDER — OXYCODONE HYDROCHLORIDE AND ACETAMINOPHEN 10; 325 MG/1; MG/1
2 TABLET ORAL 4 TIMES DAILY
Qty: 120 TABLET | Refills: 0 | Status: CANCELLED | OUTPATIENT
Start: 2018-04-04

## 2018-04-04 RX ORDER — OXYCODONE HYDROCHLORIDE AND ACETAMINOPHEN 10; 325 MG/1; MG/1
2 TABLET ORAL 4 TIMES DAILY
Qty: 120 TABLET | Refills: 0 | Status: SHIPPED | OUTPATIENT
Start: 2018-04-13 | End: 2018-04-25

## 2018-04-04 RX ORDER — OXYCODONE HYDROCHLORIDE 30 MG/1
30 TABLET ORAL 4 TIMES DAILY
Qty: 60 TABLET | Refills: 0 | Status: CANCELLED | OUTPATIENT
Start: 2018-04-04

## 2018-04-04 RX ORDER — CLONAZEPAM 1 MG/1
TABLET ORAL
Qty: 180 TABLET | Refills: 0 | Status: SHIPPED | OUTPATIENT
Start: 2018-04-04 | End: 2018-05-10

## 2018-04-04 RX ORDER — CLONAZEPAM 1 MG/1
TABLET ORAL
Qty: 180 TABLET | Refills: 0 | Status: CANCELLED | OUTPATIENT
Start: 2018-04-04

## 2018-04-04 RX ORDER — DIAZEPAM 10 MG
TABLET ORAL
Qty: 60 TABLET | Refills: 0 | Status: SHIPPED | OUTPATIENT
Start: 2018-04-07 | End: 2018-05-10

## 2018-04-04 RX ORDER — DIAZEPAM 10 MG
TABLET ORAL
Qty: 60 TABLET | Refills: 0 | Status: CANCELLED | OUTPATIENT
Start: 2018-04-04

## 2018-04-04 NOTE — TELEPHONE ENCOUNTER
..Reason for Call:   Patient has concerns about Dr Cui's transition    Detailed comments: Patient states he heard from his mother about Dr Cui and his transition and has concerns about being able to continue as his patient; please call to discuss;   Also will need to have a telephone visit, currently schedule is booking into 04-18-18, discuss;     Phone Number Patient can be reached at: Home number on file [phone] (home)    Best Time: anytime    Can we leave a detailed message on this number? YES    Call taken on 4/4/2018 at 7:44 AM by Clara Krueger
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
## 2018-04-04 NOTE — TELEPHONE ENCOUNTER
Patient had these refilled through a virtual visit today. Message closed.    Cassandra Montalvo RN, [facility]

## 2018-04-04 NOTE — MR AVS SNAPSHOT
After Visit Summary   4/4/2018    Jose Luis Christoph Gonzáles    MRN: [id number]           Patient Information     Date Of Birth          1970        Visit Information        Provider Department      4/4/2018 8:20 AM Tonie Cui MD [facility]        Today's Diagnoses     Chronic pain syndrome    -  1    Neck pain        Anxiety           Follow-ups after your visit        Follow-up notes from your care team     Return in about 1 month (around 5/4/2018).      Who to contact     If you have questions or need follow up information about today's clinic visit or your schedule please contact [facility] directly at [phone].  Normal or non-critical lab and imaging results will be communicated to you by MyChart, letter or phone within 4 business days after the clinic has received the results. If you do not hear from us within 7 days, please contact the clinic through [employer] or phone. If you have a critical or abnormal lab result, we will notify you by phone as soon as possible.  Submit refill requests through [employer] or call your pharmacy and they will forward the refill request to us. Please allow 3 business days for your refill to be completed.          Additional Information About Your Visit        MyChart Information     [employer] gives you secure access to your electronic health record. If you see a primary care provider, you can also send messages to your care team and make appointments. If you have questions, please call your primary care clinic.  If you do not have a primary care provider, please call [phone] and they will assist you.        Care EveryWhere ID     This is your Care EveryWhere ID. This could be used by other organizations to access your [facility] medical records  [id number]         Blood Pressure from Last 3 Encounters:   02/26/18 (!) 140/98   11/14/17 122/80   08/15/17 126/75    Weight from Last 3 Encounters:   02/26/18 76.2 kg (168 lb  1.6 oz)   11/14/17 79.9 kg (176 lb 3.2 oz)   08/15/17 76.2 kg (167 lb 14.4 oz)              Today, you had the following     No orders found for display         Today's Medication Changes          These changes are accurate as of 4/4/18  8:50 AM.  If you have any questions, ask your nurse or doctor.               These medicines have changed or have updated prescriptions.        Dose/Directions    finasteride 5 MG tablet   Commonly known as:  PROSCAR   This may have changed:  additional instructions   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        Dose:  5 mg   Take 1 tablet (5 mg) by mouth daily   Quantity:  30 tablet   Refills:  5            Where to get your medicines      Some of these will need a paper prescription and others can be bought over the counter.  Ask your nurse if you have questions.     Bring a paper prescription for each of these medications     clonazePAM 1 MG tablet    diazepam 10 MG tablet    oxyCODONE IR 30 MG tablet    PERCOCET  MG per tablet                Primary Care Provider Office Phone # Fax #    Tonie Zach Cui [phone] [address] [facility] [address]        Equal Access to Services     [facility]: Hadii bishop jaramillo hadasho Socalli, waaxda luqadaha, qaybta kaalmada elder, jose angel kilgore . So [facility] [phone].    ATENCIÓN: Si habla español, tiene a ceron disposición servicios gratuitos de asistencia lingüística. [facility] [phone].    We comply with applicable federal civil rights laws and [place] laws. We do not discriminate on the basis of race, color, national origin, age, disability, sex, sexual orientation, or gender identity.            Thank you!     Thank you for choosing [facility]  for your care. Our goal is always to provide you with excellent care. Hearing back from our patients is one way we can continue to improve our services. Please take a few minutes to complete the written  survey that you may receive in the mail after your visit with us. Thank you!             Your Updated Medication List - Protect others around you: Learn how to safely use, store and throw away your medicines at www.disposemymeds.org.          This list is accurate as of 4/4/18  8:50 AM.  Always use your most recent med list.                   Brand Name Dispense Instructions for use Diagnosis    adapalene 0.1 % gel    DIFFERIN    45 g    Apply topically At Bedtime    Acne vulgaris       clonazePAM 1 MG tablet    klonoPIN    180 tablet    TAKE 1-2 TABLETS BY MOUTH THREE TIMES DAILY AS NEEDED FOR ANXIETY    Anxiety       diazepam 10 MG tablet   Start taking on:  4/7/2018    VALIUM    60 tablet    TAKE 1 TABLET BY MOUTH TWICE DAILY AS NEEDED FOR SLEEP    Chronic pain syndrome, Neck pain       finasteride 5 MG tablet    PROSCAR    30 tablet    Take 1 tablet (5 mg) by mouth daily    Male pattern baldness       mupirocin 2 % ointment    BACTROBAN    22 g    Apply topically 3 times daily    Facial cellulitis       omeprazole 20 MG tablet    priLOSEC OTC    90 tablet    Take 1 tablet (20 mg) by mouth daily    Gastroesophageal reflux disease without esophagitis       order for DME     1 Bottle    Actavis testosterone gel 1% 50 mg daily to intact skin    Male hypogonadism       oxyCODONE IR 30 MG tablet   Start taking on:  4/13/2018    ROXICODONE    60 tablet    Take 1 tablet (30 mg) by mouth 4 times daily In combination with percocet.    Chronic pain syndrome, Neck pain       PERCOCET  MG per tablet   Generic drug:  oxyCODONE-acetaminophen   Start taking on:  4/13/2018     120 tablet    Take 2 tablets by mouth 4 times daily In combination with oxy IR.    Chronic pain syndrome, Neck pain       polyethylene glycol powder    MIRALAX/GLYCOLAX    527 g    STIR 17 GM OF POWDER (SEE MINGO INSIDE CAP) IN 8-OZ OF LIQUID UNTIL COMPLETELY DISSOLVED. DRINK THE SOLUTION DAILY OR AS DIRECTED.    Chronic constipation       tretinoin  0.1 % cream    RETIN-A    45 g    Apply topically At Bedtime    Acne vulgaris

## 2018-04-04 NOTE — TELEPHONE ENCOUNTER
..Reason for Call:  Medication or medication refill:    Do you use a [facility]?  Name of the pharmacy and phone number for the current request:  [facility]    Name of the medication requested: clonazepam, diazepam, oxycodone IR, percocet;     Other request: Patient is aware he's calling these in early/about ten days;     Can we leave a detailed message on this number? YES    Phone number patient can be reached at: Home number on file [phone] (home)    Best Time: anytime      Call taken on 4/4/2018 at 7:42 AM by Clara Krueger

## 2018-04-19 ENCOUNTER — TELEPHONE (OUTPATIENT)
Dept: FAMILY MEDICINE | Facility: CLINIC | Age: 48
End: 2018-04-19

## 2018-04-25 ENCOUNTER — VIRTUAL VISIT (OUTPATIENT)
Dept: FAMILY MEDICINE | Facility: CLINIC | Age: 48
End: 2018-04-25
Payer: COMMERCIAL

## 2018-04-25 DIAGNOSIS — G89.4 CHRONIC PAIN SYNDROME: ICD-10-CM

## 2018-04-25 DIAGNOSIS — R74.8 ELEVATED LIVER ENZYMES: Primary | ICD-10-CM

## 2018-04-25 DIAGNOSIS — M54.2 NECK PAIN: ICD-10-CM

## 2018-04-25 PROCEDURE — 99442 ZZC PHYSICIAN TELEPHONE EVALUATION 11-20 MIN: CPT | Performed by: FAMILY MEDICINE

## 2018-04-25 RX ORDER — OXYCODONE HYDROCHLORIDE AND ACETAMINOPHEN 10; 325 MG/1; MG/1
2 TABLET ORAL 4 TIMES DAILY
Qty: 120 TABLET | Refills: 0 | Status: SHIPPED | OUTPATIENT
Start: 2018-05-10 | End: 2018-05-14

## 2018-04-25 RX ORDER — OXYCODONE HYDROCHLORIDE AND ACETAMINOPHEN 10; 325 MG/1; MG/1
2 TABLET ORAL 4 TIMES DAILY
Qty: 120 TABLET | Refills: 0 | Status: SHIPPED | OUTPATIENT
Start: 2018-04-26 | End: 2018-04-25

## 2018-04-25 RX ORDER — OXYCODONE HYDROCHLORIDE 30 MG/1
30 TABLET ORAL 4 TIMES DAILY
Qty: 60 TABLET | Refills: 0 | Status: SHIPPED | OUTPATIENT
Start: 2018-05-10 | End: 2018-05-14

## 2018-04-25 RX ORDER — OXYCODONE HYDROCHLORIDE 30 MG/1
30 TABLET ORAL 4 TIMES DAILY
Qty: 60 TABLET | Refills: 0 | Status: SHIPPED | OUTPATIENT
Start: 2018-04-26 | End: 2018-04-25

## 2018-04-25 NOTE — MR AVS SNAPSHOT
After Visit Summary   4/25/2018    Jose Luis Christoph Gonzáles    MRN: [id number]           Patient Information     Date Of Birth          1970        Visit Information        Provider Department      4/25/2018 11:00 AM Tonie Cui MD [facility]        Today's Diagnoses     Elevated liver enzymes    -  1    Chronic pain syndrome        Neck pain           Follow-ups after your visit        Follow-up notes from your care team     Return in about 6 weeks (around 6/6/2018).      Who to contact     If you have questions or need follow up information about today's clinic visit or your schedule please contact [facility] directly at [phone].  Normal or non-critical lab and imaging results will be communicated to you by MyChart, letter or phone within 4 business days after the clinic has received the results. If you do not hear from us within 7 days, please contact the clinic through [employer] or phone. If you have a critical or abnormal lab result, we will notify you by phone as soon as possible.  Submit refill requests through [employer] or call your pharmacy and they will forward the refill request to us. Please allow 3 business days for your refill to be completed.          Additional Information About Your Visit        MyChart Information     [employer] gives you secure access to your electronic health record. If you see a primary care provider, you can also send messages to your care team and make appointments. If you have questions, please call your primary care clinic.  If you do not have a primary care provider, please call [phone] and they will assist you.        Care EveryWhere ID     This is your Care EveryWhere ID. This could be used by other organizations to access your [place] medical records  [id number]         Blood Pressure from Last 3 Encounters:   02/26/18 (!) 140/98   11/14/17 122/80   08/15/17 126/75    Weight from Last 3 Encounters:    02/26/18 76.2 kg (168 lb 1.6 oz)   11/14/17 79.9 kg (176 lb 3.2 oz)   08/15/17 76.2 kg (167 lb 14.4 oz)              Today, you had the following     No orders found for display         Today's Medication Changes          These changes are accurate as of 4/25/18 12:32 PM.  If you have any questions, ask your nurse or doctor.               Start taking these medicines.        Dose/Directions    oxyCODONE IR 30 MG tablet   Commonly known as:  ROXICODONE   Used for:  Chronic pain syndrome, Neck pain        Dose:  30 mg   Start taking on:  5/10/2018   Take 1 tablet (30 mg) by mouth 4 times daily In combination with percocet.   Quantity:  60 tablet   Refills:  0       PERCOCET  MG per tablet   Used for:  Chronic pain syndrome, Neck pain   Generic drug:  oxyCODONE-acetaminophen        Dose:  2 tablet   Start taking on:  5/10/2018   Take 2 tablets by mouth 4 times daily In combination with oxy IR.   Quantity:  120 tablet   Refills:  0            Where to get your medicines      Some of these will need a paper prescription and others can be bought over the counter.  Ask your nurse if you have questions.     Bring a paper prescription for each of these medications     oxyCODONE IR 30 MG tablet    PERCOCET  MG per tablet               Information about OPIOIDS     PRESCRIPTION OPIOIDS: WHAT YOU NEED TO KNOW   You have a prescription for an opioid (narcotic) pain medicine. Opioids can cause addiction. If you have a history of chemical dependency of any type, you are at a higher risk of becoming addicted to opioids. Only take this medicine after all other options have been tried. Take it for as short a time and as few doses as possible.     Do not:    Drive. If you drive while taking these medicines, you could be arrested for driving under the influence (DUI).    Operate heavy machinery    Do any other dangerous activities while taking these medicines.     Drink any alcohol while taking these medicines.      Take  with any other medicines that contain acetaminophen. Read all labels carefully. Look for the word  acetaminophen  or  Tylenol.  Ask your pharmacist if you have questions or are unsure.    Store your pills in a secure place, locked if possible. We will not replace any lost or stolen medicine. If you don t finish your medicine, please throw away (dispose) as directed by your pharmacist. The [place] Pollution Control Agency has more information about safe disposal: https://www.pca.[facility].[place].us/living-green/managing-unwanted-medications    All opioids tend to cause constipation. Drink plenty of water and eat foods that have a lot of fiber, such as fruits, vegetables, prune juice, apple juice and high-fiber cereal. Take a laxative (Miralax, milk of magnesia, Colace, Senna) if you don t move your bowels at least every other day.          Primary Care Provider Office Phone # Fax #    Tonie Zach Cui [phone] [address] [facility] [address]        Equal Access to Services     [facility] : Hadii aad ku hadasho Soomaali, waaxda luqadaha, qaybta kaalmada adeegyada, waxay idiin haywellington kilgore . So [facility] [phone].    ATENCIÓN: Si habla español, tiene a ceron disposición servicios gratuitos de asistencia lingüística. Llame al [phone].    We comply with applicable federal civil rights laws and [place] laws. We do not discriminate on the basis of race, color, national origin, age, disability, sex, sexual orientation, or gender identity.            Thank you!     Thank you for choosing [facility]  for your care. Our goal is always to provide you with excellent care. Hearing back from our patients is one way we can continue to improve our services. Please take a few minutes to complete the written survey that you may receive in the mail after your visit with us. Thank you!             Your Updated Medication List - Protect others around you: Learn  how to safely use, store and throw away your medicines at www.disposemymeds.org.          This list is accurate as of 4/25/18 12:32 PM.  Always use your most recent med list.                   Brand Name Dispense Instructions for use Diagnosis    adapalene 0.1 % gel    DIFFERIN    45 g    Apply topically At Bedtime    Acne vulgaris       clonazePAM 1 MG tablet    klonoPIN    180 tablet    TAKE 1-2 TABLETS BY MOUTH THREE TIMES DAILY AS NEEDED FOR ANXIETY    Anxiety       diazepam 10 MG tablet    VALIUM    60 tablet    TAKE 1 TABLET BY MOUTH TWICE DAILY AS NEEDED FOR SLEEP    Chronic pain syndrome, Neck pain       finasteride 5 MG tablet    PROSCAR    30 tablet    Take 1 tablet (5 mg) by mouth daily    Male pattern baldness       mupirocin 2 % ointment    BACTROBAN    22 g    Apply topically 3 times daily    Facial cellulitis       omeprazole 20 MG tablet    priLOSEC OTC    90 tablet    Take 1 tablet (20 mg) by mouth daily    Gastroesophageal reflux disease without esophagitis       order for DME     1 Bottle    Actavis testosterone gel 1% 50 mg daily to intact skin    Male hypogonadism       oxyCODONE IR 30 MG tablet   Start taking on:  5/10/2018    ROXICODONE    60 tablet    Take 1 tablet (30 mg) by mouth 4 times daily In combination with percocet.    Chronic pain syndrome, Neck pain       PERCOCET  MG per tablet   Generic drug:  oxyCODONE-acetaminophen   Start taking on:  5/10/2018     120 tablet    Take 2 tablets by mouth 4 times daily In combination with oxy IR.    Chronic pain syndrome, Neck pain       polyethylene glycol powder    MIRALAX/GLYCOLAX    527 g    STIR 17 GM OF POWDER (SEE MINGO INSIDE CAP) IN 8-OZ OF LIQUID UNTIL COMPLETELY DISSOLVED. DRINK THE SOLUTION DAILY OR AS DIRECTED.    Chronic constipation       tretinoin 0.1 % cream    RETIN-A    45 g    Apply topically At Bedtime    Acne vulgaris

## 2018-04-25 NOTE — PROGRESS NOTES
"Jose Luis Gonzáles is a 47 year old male who is being evaluated via a telephone visit.      The patient has been notified of following:     \"This telephone visit will be conducted via a call between you and your physician/provider. We have found that certain health care needs can be provided without the need for a physical exam.  This service lets us provide the care you need with a short phone conversation.  If a prescription is necessary we can send it directly to your pharmacy.  If lab work is needed we can place an order for that and you can then stop by our lab to have the test done at a later time.    We will bill your insurance company for this service.  Please check with your medical insurance if this type of visit is covered. You may be responsible for the cost of this type of visit if insurance coverage is denied.  The typical cost is $30 (10min), $59 (11-20min) and $85 (21-30min).  Most often these visits are shorter than 10 minutes.    If during the course of the call the physician/provider feels a telephone visit is not appropriate, you will not be charged for this service.\"       Consent has been obtained for this service by care team member: yes.   See the scanned image in the medical record.    Jose Luis Gonzáles complains of  Hospital F/U; Recheck Medication; and Results      I have reviewed and updated the patient's Past Medical History, Social History, Family History and Medication List.    ALLERGIES  Clonidine; Contrast dye; Ibuprofen; Keflex [cephalexin]; Seroquel [quetiapine]; and Valproic acid    Venus Marinelli MA on 4/25/2018 at 11:02 AM   (MA signature)    Additional provider notes:   Spoke with patient via phone regarding recent ER visit through [facility].  Full records reviewed with the patient and through care everywhere.  He presented with nausea, vomiting, abdominal pain.  Lipase was normal.  Had been elevated in January and they were concerned regarding alcoholic pancreatitis.  But the " patient again reiterates to me that he rarely drinks and does not really think that that was the case.  An ultrasound was obtained at this recent visit which did not show specific gallbladder disease but there was evidence of a fatty liver.  Liver enzymes were elevated as well.  He has a relationship established with Dr. Sahil Melendez and is scheduled to see him soon for colonoscopy.  There also planning some type of endoscopic procedure to help investigate why he is having pancreatic symptoms.  So the patient I had a discussion about potential obstructive causes and the difficulty in diagnosing these.  He does not have a reason for a fatty liver other than lifestyle.  His cholesterol is perfect and he has no evidence of diabetes.  Admittedly does not exercise much and tends to eat a high fat diet.    ASSESSMENT / PLAN:  (R74.8) Elevated liver enzymes  (primary encounter diagnosis)  Comment: Discussion as above.  Reiterated the need to avoid alcohol until further diagnosis can be performed with his specialist.  Suggested working on exercise and improving his diet  Plan:     (G89.4) Chronic pain syndrome  Comment: Stable on current dosage.  Refilled ×2 separate 2 week prescriptions  Plan: oxyCODONE IR (ROXICODONE) 30 MG tablet,         PERCOCET  MG per tablet, DISCONTINUED:         oxyCODONE IR (ROXICODONE) 30 MG tablet,         DISCONTINUED: PERCOCET  MG per tablet            (M54.2) Neck pain  Comment:   Plan: oxyCODONE IR (ROXICODONE) 30 MG tablet,         PERCOCET  MG per tablet, DISCONTINUED:         oxyCODONE IR (ROXICODONE) 30 MG tablet,         DISCONTINUED: PERCOCET  MG per tablet               I have reviewed the note as documented above.  This accurately captures the substance of my conversation with the patient,  SJeremi Cui M.D.     Total time of call between patient and provider was 12 minutes

## 2018-05-03 ENCOUNTER — TELEPHONE (OUTPATIENT)
Dept: FAMILY MEDICINE | Facility: CLINIC | Age: 48
End: 2018-05-03

## 2018-05-03 DIAGNOSIS — K08.89 PAIN, DENTAL: Primary | ICD-10-CM

## 2018-05-03 DIAGNOSIS — G89.4 CHRONIC PAIN SYNDROME: ICD-10-CM

## 2018-05-03 NOTE — TELEPHONE ENCOUNTER
Reason for Call:  Other prescription    Detailed comments: Patient called back said RN called him . Wants additional pain medication not the scheduled one(s) early for tooth problem.    Phone Number Patient can be reached at: Cell number on file:    Telephone Information:   Mobile [phone]     Best Time: any    Can we leave a detailed message on this number? YES    Call taken on 5/3/2018 at 4:03 PM by Phoebe Corley

## 2018-05-03 NOTE — TELEPHONE ENCOUNTER
Please clarify with patient. He has a prescription for Oxycodone and Percocet that is due to be filled on 5/10/18. Is he wanting this filled early? He may need to be seen if this is for a different issue.     Lian Rodriguez RN, BSN

## 2018-05-03 NOTE — TELEPHONE ENCOUNTER
Per chart review, this patient request has been sent to provider for review earlier today. Will await provider response.     Livier Stokes RN

## 2018-05-03 NOTE — TELEPHONE ENCOUNTER
Reason for Call:  Other prescription    Detailed comments: Needs pain pills for tooth problem. Please call back and advise.    Phone Number Patient can be reached at: Cell number on file:    Telephone Information:   Mobile [phone]     Best Time: any    Can we leave a detailed message on this number? YES    Call taken on 5/3/2018 at 10:47 AM by Phoebe Corley

## 2018-05-03 NOTE — TELEPHONE ENCOUNTER
This writer attempted to contact Jose Luis on 05/03/18      Team please contact patient and ask if his refill request for oxycodone is a request to refill early?  His refill request is not due to be filled until 5/10/18. If he is requesting an early refill please ask why. Route to RN pool after.  Nolvia Murphy RN          Reason for call Oxycodone and left message.      If patient calls back:   Patient contacted by a Registered Nurse. Inform patient that someone from the RN group will contact them, document that pt called and route to P DYAD 3 RN POOL [[id number]]        Nolvia Murphy, RN

## 2018-05-04 ENCOUNTER — TRANSFERRED RECORDS (OUTPATIENT)
Dept: MULTI SPECIALTY CLINIC | Facility: CLINIC | Age: 48
End: 2018-05-04
Payer: COMMERCIAL

## 2018-05-04 ENCOUNTER — INFUSION THERAPY VISIT (OUTPATIENT)
Dept: FAMILY MEDICINE | Facility: CLINIC | Age: 48
End: 2018-05-04
Payer: COMMERCIAL

## 2018-05-04 ENCOUNTER — TELEPHONE (OUTPATIENT)
Dept: NURSING | Facility: CLINIC | Age: 48
End: 2018-05-04

## 2018-05-04 ENCOUNTER — NURSE TRIAGE (OUTPATIENT)
Dept: NURSING | Facility: CLINIC | Age: 48
End: 2018-05-04

## 2018-05-04 ENCOUNTER — TELEPHONE (OUTPATIENT)
Dept: FAMILY MEDICINE | Facility: CLINIC | Age: 48
End: 2018-05-04

## 2018-05-04 DIAGNOSIS — Z53.9 ERRONEOUS ENCOUNTER--DISREGARD: Primary | ICD-10-CM

## 2018-05-04 RX ORDER — OXYCODONE HYDROCHLORIDE 15 MG/1
15-30 TABLET ORAL EVERY 4 HOURS PRN
Qty: 30 TABLET | Refills: 0 | Status: SHIPPED | OUTPATIENT
Start: 2018-05-04 | End: 2018-06-04

## 2018-05-04 NOTE — TELEPHONE ENCOUNTER
..Reason for Call:    Prescription request    Detailed comments: Patient had a tooth extraction appointment but they cut into a good tooth, is in a lot of pain; requesting something for this pain, Patient declined when offered medications at the dental office as he only goes through Dr Cui;   Patient states he has a colonoscopy scheduled this afternoon at 3 pm and his mother, Evette Gonzáles would be picking up script; Thank you    Phone Number Patient can be reached at: Home number on file [phone] (home)    Best Time: anytime      Can we leave a detailed message on this number? YES    Call taken on 5/4/2018 at 10:06 AM by Clara Krueger

## 2018-05-04 NOTE — TELEPHONE ENCOUNTER
Clinic Action Needed:please call Mirna  Reason for Call:Caller needs to have the RN verify the order for 15mg of oxycodiene 15 mg in addition to his regular order. I read the note from Dr Cui to him. The pharmacist, Avi, requests a call at  #[phone] sometime next week to discuss the concerns.  Librado Julio RN -[phone]    Patient Recommendations/Teaching:  Routed to:Dr Cui

## 2018-05-04 NOTE — TELEPHONE ENCOUNTER
Per RN below:   Nikko Hinson, Medical Assistant      Per chart review, this patient request has been sent to provider for review earlier today. Will await provider response.      Livier Stokes RN

## 2018-05-04 NOTE — TELEPHONE ENCOUNTER
returned call    Best number to reach caller: Home number on file [phone] (home)    Is it ok to leave a detailed message: YES

## 2018-05-04 NOTE — TELEPHONE ENCOUNTER
Reason for Call:  Other prescription    Detailed comments: Verify oxycodine for 15 mg in addition to 30 mg routine is what you want us to dispense. This need answer asap please s sending high priority message.    [facility]: [phone]    Best Time: any    Can we leave a detailed message on this number? YES    Call taken on 5/4/2018 at 3:54 PM by Phoebe Corley

## 2018-05-04 NOTE — TELEPHONE ENCOUNTER
Caller needs to have the RN verify the order for 15mg of oxycodiene 15 mg in addition to his regular order. I read the note from Dr Cui to him. The pharmacist, Avi, requests a call at  #[phone] sometime next week to discuss the concerns.  Librado Julio RN [phone]

## 2018-05-07 NOTE — TELEPHONE ENCOUNTER
Yes, this is a very short-term breakthrough prescription.  He is monitored carefully in our program and through the  database

## 2018-05-09 NOTE — TELEPHONE ENCOUNTER
"Consistently filling 3 d early (per 2 weeks) - will clamp down on dating   \"fill on or after..\"  "

## 2018-05-10 ENCOUNTER — TELEPHONE (OUTPATIENT)
Dept: FAMILY MEDICINE | Facility: CLINIC | Age: 48
End: 2018-05-10

## 2018-05-10 DIAGNOSIS — G89.4 CHRONIC PAIN SYNDROME: ICD-10-CM

## 2018-05-10 DIAGNOSIS — M54.2 NECK PAIN: ICD-10-CM

## 2018-05-10 DIAGNOSIS — F41.9 ANXIETY: ICD-10-CM

## 2018-05-10 NOTE — TELEPHONE ENCOUNTER
..Reason for Call:  Other     Detailed comments:[employer] called requesting a Morphine equivalent form faxed to [facility] please call [phone] to get a good fax #    Phone Number Patient can be reached at: [phone]    Best Time: anytime    Can we leave a detailed message on this number? YES    Call taken on 5/10/2018 at 9:43 AM by Milo Dickinson

## 2018-05-10 NOTE — TELEPHONE ENCOUNTER
Requested Prescriptions   Pending Prescriptions Disp Refills     clonazePAM (KLONOPIN) 1 MG tablet [Pharmacy Med Name: CLONAZEPAM 1MG TABLETS] 180 tablet 0     Sig: TAKE 1 TO 2 TABLETS BY MOUTH THREE TIMES DAILY AS NEEDED FOR ANXIETY    There is no refill protocol information for this order        diazepam (VALIUM) 10 MG tablet [Pharmacy Med Name: DIAZEPAM 10MG TABLETS] 60 tablet 0     Sig: TAKE 1 TABLET BY MOUTH TWICE DAILY AS NEEDED FOR SLEEP    There is no refill protocol information for this order        clonazePAM (KLONOPIN) 1 MG tablet      Last Written Prescription Date:  4/4/18  Last Fill Quantity: 180,   # refills: 0  Last Office Visit: 4/25/18  Future Office visit:       Routing refill request to provider for review/approval because:  Drug not on the [facility] or  [facility] refill protocol or controlled substance    diazepam (VALIUM) 10 MG tablet      Last Written Prescription Date:  4/7/18  Last Fill Quantity: 60,   # refills: 0  Last Office Visit: 4/25/18  Future Office visit:       Routing refill request to provider for review/approval because:  Drug not on the [facility] or  [facility] refill protocol or controlled substance

## 2018-05-11 NOTE — TELEPHONE ENCOUNTER
...Reason for Call:  Other     Detailed comments: Patient called and would like to get in as soon as possible, he said he has some questions and recently had a colonoscopy.    Phone Number Patient can be reached at: Home number on file [phone] (home)    Best Time: anytime     Can we leave a detailed message on this number? YES    Call taken on 5/11/2018 at 9:52 AM by Milo Dickinson

## 2018-05-14 DIAGNOSIS — M54.2 NECK PAIN: ICD-10-CM

## 2018-05-14 DIAGNOSIS — G89.4 CHRONIC PAIN SYNDROME: ICD-10-CM

## 2018-05-14 NOTE — TELEPHONE ENCOUNTER
Reason for Call:  Medication or medication refill:    Do you use a [facility]?  Name of the pharmacy and phone number for the current request:  WRITTEN PRESCRIPTION     Name of the medication requested: oxyCODONE IR (ROXICODONE) 30 MG tablet  PERCOCET  MG per tablet    Other request: patient would like to  RX on May 21st and script dated for May 24th.     Can we leave a detailed message on this number? YES    Phone number patient can be reached at: [phone]    Best Time: anytime     Call taken on 5/14/2018 at 5:21 PM by Harvey Gordillo

## 2018-05-15 RX ORDER — OXYCODONE HYDROCHLORIDE AND ACETAMINOPHEN 10; 325 MG/1; MG/1
2 TABLET ORAL 4 TIMES DAILY
Qty: 120 TABLET | Refills: 0 | Status: SHIPPED | OUTPATIENT
Start: 2018-05-24 | End: 2018-06-04

## 2018-05-15 RX ORDER — OXYCODONE HYDROCHLORIDE 30 MG/1
30 TABLET ORAL 4 TIMES DAILY
Qty: 60 TABLET | Refills: 0 | Status: SHIPPED | OUTPATIENT
Start: 2018-05-24 | End: 2018-06-04

## 2018-05-15 NOTE — TELEPHONE ENCOUNTER
Requested Prescriptions   Pending Prescriptions Disp Refills     PERCOCET  MG per tablet    Possible duplicate request    Last Written Prescription Date: 05/10/18  Last Fill Quantity: 120 tablet,   # refills: 0  Last Office Visit: 2/26/18 Dr. Cui  Future Office visit:    Next 5 appointments (look out 90 days)     Dorian 15, 2018  3:00 PM CDT   Office Visit with Tonie uCi MD   [facility] ([address] [address]   [phone]                   Routing refill request to provider for review/approval because:  Drug not on the [facility] or [facility] refill protocol or controlled substance 120 tablet 0     Sig: Take 2 tablets by mouth 4 times daily In combination with oxy IR.    There is no refill protocol information for this order                oxyCODONE IR (ROXICODONE) 30 MG tablet     Possible duplicate request  Last Written Prescription Date: 05/10/18  Last Fill Quantity: 60 tablet,   # refills: 0  Last Office Visit: 2/26/18 Dr. Cui  Future Office visit:    Next 5 appointments (look out 90 days)     Dorian 15, 2018  3:00 PM CDT   Office Visit with Tonie Cui MD   [facility] ([address] [address]   [phone]                   Routing refill request to provider for review/approval because:  Drug not on the [facility], [facility] or [facility] refill protocol or controlled substance 60 tablet 0     Sig: Take 1 tablet (30 mg) by mouth 4 times daily In combination with percocet.    There is no refill protocol information for this order

## 2018-05-15 NOTE — TELEPHONE ENCOUNTER
Routing refill request to provider for review/approval because:  Drug not on the [facility] refill protocol   Name of the medication requested: oxyCODONE IR (ROXICODONE) 30 MG tablet  PERCOCET  MG per tablet     Other request: patient would like to  RX on May 21st and script dated for May 24th.     Janette Marrufo RN

## 2018-05-16 RX ORDER — CLONAZEPAM 1 MG/1
TABLET ORAL
Qty: 180 TABLET | Refills: 0 | Status: SHIPPED | OUTPATIENT
Start: 2018-05-15 | End: 2018-06-18

## 2018-05-16 RX ORDER — DIAZEPAM 10 MG
TABLET ORAL
Qty: 60 TABLET | Refills: 0 | Status: SHIPPED | OUTPATIENT
Start: 2018-05-15 | End: 2018-06-18

## 2018-05-24 ENCOUNTER — TELEPHONE (OUTPATIENT)
Dept: FAMILY MEDICINE | Facility: CLINIC | Age: 48
End: 2018-05-24

## 2018-05-24 NOTE — TELEPHONE ENCOUNTER
Central Prior Authorization Team   Phone: [phone]      PA Initiation    Medication: PA for Oxycodone HCI  Insurance Company: [employer] [place] - Phone [phone] Fax [phone]  Pharmacy Filling the Rx: [id number] - [place] - [address]  AT [facility] [address]  Filling Pharmacy Phone: [phone]  Filling Pharmacy Fax: [phone]  Start Date: 5/24/2018

## 2018-05-24 NOTE — TELEPHONE ENCOUNTER
"PA for Oxycodone HCI    Key.cover"[email or web address][email or web address] and click \"Enter a Key\"    Enter patient name and  And the key    Key: [id number]    Complete the form and click \"Send to Plan\"    PA or alternative    Arcelia Rodriguez    "

## 2018-05-25 ENCOUNTER — TELEPHONE (OUTPATIENT)
Dept: FAMILY MEDICINE | Facility: CLINIC | Age: 48
End: 2018-05-25

## 2018-05-25 NOTE — TELEPHONE ENCOUNTER
Pt informed, pt states another PA needs to be done for the Endocet.    Jenny BREEN, Patient Care

## 2018-05-25 NOTE — TELEPHONE ENCOUNTER
Central Prior Authorization Team   Phone: [phone]      PA Initiation    Medication: PERCOCET  MG  Insurance Company: [facility] - Phone [phone] Fax [phone]  Pharmacy Filling the Rx: [id number] - [place] - [address]  [address] [facility] [address]  Filling Pharmacy Phone: [phone]  Filling Pharmacy Fax: [phone]  Start Date: 5/25/2018

## 2018-05-25 NOTE — TELEPHONE ENCOUNTER
Prior Authorization Approval    Authorization Effective Date: 5/24/2018  Authorization Expiration Date: 11/24/2018  Medication: Oxycodone HCI - Approved  Approved Dose/Quantity:    Reference #: [id number]   Insurance Company: [employer] [place] - Phone [phone] Fax [phone]  Expected CoPay:       CoPay Card Available:      Foundation Assistance Needed:    Which Pharmacy is filling the prescription (Not needed for infusion/clinic administered): [facility] - [place] - [address] [facility]  Pharmacy Notified: Yes  Patient Notified: Yes

## 2018-05-25 NOTE — TELEPHONE ENCOUNTER
Pt called in to follow up on PA form and to schedule a telephone visit with Basilia. Please call pt back at [phone].

## 2018-05-25 NOTE — TELEPHONE ENCOUNTER
Prior Authorization Retail Medication Request    Medication/Dose: PERCOCET  MG  ICD code (if different than what is on RX):     Previously Tried and Failed:     Rationale:       Insurance Name:     Insurance ID:         Pharmacy Information (if different than what is on RX)  Name:     Phone:

## 2018-05-29 NOTE — TELEPHONE ENCOUNTER
PRIOR AUTHORIZATION DENIED    Medication: PERCOCET  MG - Denied    Denial Date: 5/26/2018    Denial Rational:  Must have tired/failed 3 drugs that are covered by the plan.     Appeal Information:    If you would like to appeal, please supply P/A team with a letter of medical necessity with clinical reason.

## 2018-05-29 NOTE — TELEPHONE ENCOUNTER
PA Denied would you like to appeal, prescribe alternative or patient will have to pay out of pocket?

## 2018-05-30 NOTE — TELEPHONE ENCOUNTER
Spoke with pharmacy and they did not received anything that stated percocet was denied. They only received something stating oxycodone was covered      Ashley Disla MA

## 2018-05-31 NOTE — TELEPHONE ENCOUNTER
Please verify with patient that he picked up prescription and no further issues.  Close encounter if nothing else is needed.

## 2018-05-31 NOTE — TELEPHONE ENCOUNTER
Picked up Oxycodone and Percocet patient paid out of pocket.   Pharmacy staff said they're are supposed to refund for the Oxycodone.   Pharmacist will get back to patient on the percocet, whether he will get a refund or not.       Nikko Hinson, Medical Assistant

## 2018-06-02 ENCOUNTER — NURSE TRIAGE (OUTPATIENT)
Dept: NURSING | Facility: CLINIC | Age: 48
End: 2018-06-02

## 2018-06-02 ENCOUNTER — TELEPHONE (OUTPATIENT)
Dept: FAMILY MEDICINE | Facility: CLINIC | Age: 48
End: 2018-06-02

## 2018-06-02 DIAGNOSIS — M54.2 NECK PAIN: ICD-10-CM

## 2018-06-02 DIAGNOSIS — G89.4 CHRONIC PAIN SYNDROME: ICD-10-CM

## 2018-06-02 NOTE — TELEPHONE ENCOUNTER
"Clinic Action Needed:Yes  FNA Triage Call  Presenting Problem:    Patient calling to see if 2 refills were ready for him.   Is looking for oxycodone and Endocet.   \"I've been on this for many years.\"   Caller is difficult to understand over the phone.   Advised Patient that the last refill I see in the chart was on 5/24.   Advised Patient that I was going to read the current refill note in the chart to see how I could best help him.   Caller hung up on this RN before I could complete getting information from him.  This RN called the Patient back but he hung up again.    Will send a message to the clinic to call the Patient on Monday 6/4 to follow up on his refill request.      Routed to:Dr Cui/nurse vianney Boyce RN/ [facility]        "

## 2018-06-02 NOTE — TELEPHONE ENCOUNTER
"Patient calling to see if 2 refills were ready for him.   Is looking for oxycodone and Endocet.   \"I've been on this for many years.\"   Caller is difficult to understand over the phone.   Advised Patient that the last refill I see in the chart was on 5/24.   Advised Patient that I was going to read the current refill note in the chart to see how I could best help him.   Caller hung up on this RN before I could complete getting information from him.  This RN called the Patient back but he hung up again.    Will send a message to the clinic to call the Patient on Monday 6/4 to follow up on his refill request.      Reason for Disposition    Caller requesting a NON-URGENT new prescription or refill and triager unable to refill per unit policy    Protocols used: MEDICATION QUESTION CALL-ADULT-      "

## 2018-06-04 ENCOUNTER — NURSE TRIAGE (OUTPATIENT)
Dept: NURSING | Facility: CLINIC | Age: 48
End: 2018-06-04

## 2018-06-04 ENCOUNTER — TELEPHONE (OUTPATIENT)
Dept: FAMILY MEDICINE | Facility: CLINIC | Age: 48
End: 2018-06-04

## 2018-06-04 DIAGNOSIS — G89.4 CHRONIC PAIN SYNDROME: ICD-10-CM

## 2018-06-04 DIAGNOSIS — M54.2 NECK PAIN: ICD-10-CM

## 2018-06-04 RX ORDER — OXYCODONE HYDROCHLORIDE 30 MG/1
30 TABLET ORAL 4 TIMES DAILY
Qty: 60 TABLET | Refills: 0 | Status: SHIPPED | OUTPATIENT
Start: 2018-06-08 | End: 2018-06-05

## 2018-06-04 RX ORDER — OXYCODONE HYDROCHLORIDE AND ACETAMINOPHEN 10; 325 MG/1; MG/1
2 TABLET ORAL 4 TIMES DAILY
Qty: 120 TABLET | Refills: 0 | Status: SHIPPED | OUTPATIENT
Start: 2018-06-08 | End: 2018-06-05

## 2018-06-04 NOTE — TELEPHONE ENCOUNTER
..Reason for Call:   prescriptions    Detailed comments: Patient checking status on scripts/has information about having paid for his medications out of pocket/reimbursed and has questions also about this  Call when scripts are ready/MomEvette will ; Thank you  Phone Number Patient can be reached at: Home number on file [phone] (home)    Best Time: anytime    Can we leave a detailed message on this number? YES    Call taken on 6/4/2018 at 10:04 AM by Clara Krueger

## 2018-06-04 NOTE — TELEPHONE ENCOUNTER
...Reason for Call:  prescription    Detailed comments: Patient would like scripts to be left at the  OXYCODONE     Phone Number Patient can be reached at: Home number on file [phone] (home)    Best Time: ANYTIME    Can we leave a detailed message on this number? YES    Call taken on 6/4/2018 at 10:50 AM by Milo Dickinson

## 2018-06-04 NOTE — TELEPHONE ENCOUNTER
Patient states medication start date is supposed to be for June 7th not the 8th due to May having 31 days. Patient would like start dates switched.

## 2018-06-05 RX ORDER — OXYCODONE HYDROCHLORIDE AND ACETAMINOPHEN 10; 325 MG/1; MG/1
2 TABLET ORAL 4 TIMES DAILY
Qty: 120 TABLET | Refills: 0 | Status: SHIPPED | OUTPATIENT
Start: 2018-06-07 | End: 2018-06-06

## 2018-06-05 RX ORDER — OXYCODONE HYDROCHLORIDE 30 MG/1
30 TABLET ORAL 4 TIMES DAILY
Qty: 60 TABLET | Refills: 0 | Status: SHIPPED | OUTPATIENT
Start: 2018-06-07 | End: 2018-06-06

## 2018-06-05 NOTE — TELEPHONE ENCOUNTER
Patient said he is behind now due to May having 31 days    Please call him back to advise    Thank you

## 2018-06-05 NOTE — TELEPHONE ENCOUNTER
returned call    Best number to reach caller: [phone] (H)    Is it ok to leave a detailed message: YES

## 2018-06-05 NOTE — TELEPHONE ENCOUNTER
Clinic Action Needed:Yes  FNA Triage Call  Presenting Problem: Patient said his recent  prescriptions were dated 6/8 and are supposed be dated 6/7. He requests a call back from his care team tomorrow (6/5) about this. Phone # [phone].    Routed to:  Dr Tonie Sierra RN/FNA

## 2018-06-06 DIAGNOSIS — G89.4 CHRONIC PAIN SYNDROME: ICD-10-CM

## 2018-06-06 DIAGNOSIS — M54.2 NECK PAIN: ICD-10-CM

## 2018-06-06 RX ORDER — OXYCODONE HYDROCHLORIDE AND ACETAMINOPHEN 10; 325 MG/1; MG/1
2 TABLET ORAL 4 TIMES DAILY
Qty: 120 TABLET | Refills: 0 | Status: SHIPPED | OUTPATIENT
Start: 2018-06-07 | End: 2018-06-18

## 2018-06-06 RX ORDER — OXYCODONE HYDROCHLORIDE 30 MG/1
30 TABLET ORAL 4 TIMES DAILY
Qty: 60 TABLET | Refills: 0 | Status: SHIPPED | OUTPATIENT
Start: 2018-06-07 | End: 2018-06-18

## 2018-06-08 NOTE — TELEPHONE ENCOUNTER
I do not want to change the dating of his routine refills.  This will complicate things too much.  So I will print a small limited prescription for some breakthrough medication to be used for up to 5 days.  OK for mom to     no

## 2018-06-15 ENCOUNTER — TELEPHONE (OUTPATIENT)
Dept: FAMILY MEDICINE | Facility: CLINIC | Age: 48
End: 2018-06-15

## 2018-06-15 NOTE — TELEPHONE ENCOUNTER
Reason for call:  Medication   If this is a refill request, has the caller requested the refill from the pharmacy already? No  Will the patient be using a [facility]? No  Name of the pharmacy and phone number for the current request: NA    Name of the medication requested: oxycodone IR and endocet or oxycodone-acetaminophen     Other request: Patient's insurance will not cover percocet. Will only cover generic endocet or oxycodone-acetaminophen . He could not make it to his appointment today. He would like to  his hard copies as soon as possible on Monday and he will make another appointment to see Dr. Cui when he can.    Phone number to reach patient:  Home number on file [phone] (home)    Best Time:  As soon as possible    Can we leave a detailed message on this number?  YES     Abbey MUNOZ  Central Scheduler

## 2018-06-16 DIAGNOSIS — M54.2 NECK PAIN: ICD-10-CM

## 2018-06-16 DIAGNOSIS — F41.9 ANXIETY: ICD-10-CM

## 2018-06-16 DIAGNOSIS — G89.4 CHRONIC PAIN SYNDROME: ICD-10-CM

## 2018-06-16 NOTE — TELEPHONE ENCOUNTER
Patient is requesting refill of his pain pills, his valium and diazepam by Monday [place], missed and rescheduled appointment for 07/27/18 with PCP. He states that he is dealing with dental issues, pain issues, and autistic son that caused him to miss his recent appointment. He is wondering if Dr. Cui would refill his following medications:   oxyCODONE IR (ROXICODONE) 30 MG tablet  PERCOCET  MG per tablet  clonazePAM (KLONOPIN) 1 MG tablet  diazepam (VALIUM) 10 MG tablet    By Monday morning prior to [place]. He would appreciate a call back at [phone].  He declined triage services or needs at this time. He is willing to come in to clinic on Monday to get hard copy scripts if provider is willing to refill.     Janet Galan, RN, BSN  [facility]

## 2018-06-18 ENCOUNTER — TELEPHONE (OUTPATIENT)
Dept: FAMILY MEDICINE | Facility: CLINIC | Age: 48
End: 2018-06-18

## 2018-06-18 ENCOUNTER — NURSE TRIAGE (OUTPATIENT)
Dept: NURSING | Facility: CLINIC | Age: 48
End: 2018-06-18

## 2018-06-18 DIAGNOSIS — M54.2 NECK PAIN: ICD-10-CM

## 2018-06-18 DIAGNOSIS — G89.4 CHRONIC PAIN SYNDROME: ICD-10-CM

## 2018-06-18 PROCEDURE — 80307 DRUG TEST PRSMV CHEM ANLYZR: CPT | Mod: 90 | Performed by: FAMILY MEDICINE

## 2018-06-18 PROCEDURE — 99000 SPECIMEN HANDLING OFFICE-LAB: CPT | Performed by: FAMILY MEDICINE

## 2018-06-18 RX ORDER — OXYCODONE HYDROCHLORIDE AND ACETAMINOPHEN 10; 325 MG/1; MG/1
2 TABLET ORAL 4 TIMES DAILY
Qty: 120 TABLET | Refills: 0 | Status: SHIPPED | OUTPATIENT
Start: 2018-06-22 | End: 2018-06-27

## 2018-06-18 RX ORDER — OXYCODONE HYDROCHLORIDE 30 MG/1
30 TABLET ORAL 4 TIMES DAILY
Qty: 60 TABLET | Refills: 0 | Status: SHIPPED | OUTPATIENT
Start: 2018-06-22 | End: 2018-06-27

## 2018-06-18 RX ORDER — OXYCODONE HYDROCHLORIDE 30 MG/1
30 TABLET ORAL 4 TIMES DAILY
Qty: 60 TABLET | Refills: 0 | Status: CANCELLED | OUTPATIENT
Start: 2018-06-18

## 2018-06-18 RX ORDER — DIAZEPAM 10 MG
TABLET ORAL
Qty: 60 TABLET | Refills: 0 | Status: SHIPPED | OUTPATIENT
Start: 2018-06-18 | End: 2018-07-31

## 2018-06-18 RX ORDER — CLONAZEPAM 1 MG/1
TABLET ORAL
Qty: 180 TABLET | Refills: 0 | Status: SHIPPED | OUTPATIENT
Start: 2018-06-18 | End: 2018-07-31

## 2018-06-18 NOTE — TELEPHONE ENCOUNTER
Prescription refilled.  But I would like a urine sample left for urine tox screen before picking up prescription.  This is part of the new policy regarding chronic pain prescriptions.

## 2018-06-18 NOTE — TELEPHONE ENCOUNTER
Reason for Call:  Other prescription    Detailed comments: Jose Luis is calling to ask about his prescriptions.  He would like to  just the hard copies to be filled at a later date. He is not looking to fill them early. Please call him.  Thank you     Phone Number Patient can be reached at: Home number on file [phone] (home)    Best Time: Any    Can we leave a detailed message on this number? YES    Call taken on 6/18/2018 at 11:06 AM by Silverio Young

## 2018-06-18 NOTE — TELEPHONE ENCOUNTER
Requested Prescriptions   Pending Prescriptions Disp Refills     PERCOCET  MG per tablet 120 tablet 0     Sig: Take 2 tablets by mouth 4 times daily In combination with oxy IR.    There is no refill protocol information for this order        oxyCODONE IR (ROXICODONE) 30 MG tablet 60 tablet 0     Sig: Take 1 tablet (30 mg) by mouth 4 times daily In combination with percocet.    There is no refill protocol information for this order        clonazePAM (KLONOPIN) 1 MG tablet 180 tablet 0     Sig: TAKE 1 TO 2 TABLETS BY MOUTH THREE TIMES DAILY AS NEEDED FOR ANXIETY    There is no refill protocol information for this order        diazepam (VALIUM) 10 MG tablet 60 tablet 0     Sig: TAKE 1 TABLET BY MOUTH TWICE DAILY AS NEEDED FOR SLEEP    There is no refill protocol information for this order        Routing refill request to provider for review/approval because:  Drug not on the [facility] refill protocol     Lian Rodriguez RN, BSN

## 2018-06-18 NOTE — TELEPHONE ENCOUNTER
Clinic Action Needed: Please call back Pt , when Rxs hard copy ready for  by Pt   Or questions .   Reason for Call: Pt  requesting refill per routine every 2 weeks .   From PCP  Day -1)   oxyCODONE IR (ROXICODONE) 30 MG tablet 30 mg Oral 4 TIMES DAILY 180 mg     2) Needs to have generic  Endocet   Instead of PERCOCET  MG per tablet 2 tablet Oral 4 TIMES DAILY to be covered by insurance .  Please have start date for both Rx's for 6/21/18 .  Next appt is 6/27/18 with PCP .     Routed to:Sent to PCP's nurse pool.   Callie Storey RN, [facility]

## 2018-06-20 ENCOUNTER — TELEPHONE (OUTPATIENT)
Dept: FAMILY MEDICINE | Facility: CLINIC | Age: 48
End: 2018-06-20

## 2018-06-20 NOTE — TELEPHONE ENCOUNTER
Panel Management Review      BP Readings from Last 1 Encounters:   02/26/18 (!) 140/98      Last Office Visit with this department: 6/18/2018        Patient is due/failing the following:   BP CHECK    Action needed:   Patient needs office visit for hypertension with provider.    Type of outreach:    Sent [employer] message. and Sent letter.    Nikko Hinson, Medical Assistant

## 2018-06-21 ENCOUNTER — TELEPHONE (OUTPATIENT)
Dept: FAMILY MEDICINE | Facility: CLINIC | Age: 48
End: 2018-06-21

## 2018-06-21 NOTE — TELEPHONE ENCOUNTER
"PA for Oxycodone-Acetaminophen    [email or web address]    Enter key: [id number]    Enter patient's last name and     Complete the form and click \"Send to Plan\"    PA or alternative    Arcelia Rodriguez    "

## 2018-06-21 NOTE — TELEPHONE ENCOUNTER
Central Prior Authorization Team   Phone: [phone]      PA Initiation    Medication: PA for Oxycodone-Acetaminophen  Insurance Company: [employer] [place] - Phone [phone] Fax [phone]  Pharmacy Filling the Rx: [id number] - [place] - [address]  AT [facility] [address]  Filling Pharmacy Phone: [phone]  Filling Pharmacy Fax:    Start Date: 6/21/2018

## 2018-06-21 NOTE — TELEPHONE ENCOUNTER
"PA for Endocet    [email or web address]    Enter key: [id number]    Enter patients last name and     Complete the form and click \"Send to Plan\"    PA or alternative    Arcelia Rodriguez    "

## 2018-06-22 NOTE — TELEPHONE ENCOUNTER
Central Prior Authorization Team   Phone: [phone]    PA Initiation    Medication: endocet  Insurance Company: [facility] - Phone [phone] Fax [phone]  Pharmacy Filling the Rx: [id number] - [place] - [address]  AT [facility] [address]  Filling Pharmacy Phone: [phone]  Filling Pharmacy Fax:    Start Date: 6/22/2018

## 2018-06-22 NOTE — TELEPHONE ENCOUNTER
..Reason for Call:  call back    Detailed comments: Pstient called back said someone called from our office.    Phone Number Patient can be reached at: Home number on file [phone] (home)    Best Time: anytime    Can we leave a detailed message on this number? YES    Call taken on 6/22/2018 at 9:17 AM by Milo Dickinson

## 2018-06-22 NOTE — TELEPHONE ENCOUNTER
Central Prior Authorization Team   Phone: [phone]    Called and spoke with pharmacy due to duplicate request to see if patient wants Percocet or Endocet.  Pharmacist states patient is filling rx for Endocet.

## 2018-06-22 NOTE — TELEPHONE ENCOUNTER
PRIOR AUTHORIZATION DENIED    Medication: endocet    Denial Date: 6/22/2018    Denial Rational: Insurance limits medication to 6 tablets per day.         Appeal Information:   If provider would like to appeal please provide a letter of medical necessity and route back to PA team.

## 2018-06-22 NOTE — TELEPHONE ENCOUNTER
PA denied.  If provider would like to appeal please provide a letter of medical necessity and route back to PA team.  Otherwise, please close encounter when finished.   Thanks   Cassandra (Routing comment)     Nikko Hinson, Medical Assistant

## 2018-06-25 LAB — COMPREHEN DRUG ANALYSIS UR: NORMAL

## 2018-06-25 NOTE — TELEPHONE ENCOUNTER
OK for telephone visit - can double book Wednesday    I'm going to send a [address] message as well

## 2018-06-25 NOTE — TELEPHONE ENCOUNTER
..Reason for Call:    Telephone appointment and prescription    Detailed comments: please call to clarify the medications/confusion with quantity - etc;   Hoping to have a telephone appointment if at all possible to discuss this issue    Phone Number Patient can be reached at: Home number on file [phone] (home)    Best Time: anytime    Can we leave a detailed message on this number? YES    Call taken on 6/25/2018 at 8:02 AM by Clara Krueger

## 2018-06-26 NOTE — TELEPHONE ENCOUNTER
This writer attempted to contact pt on 06/26/18      Reason for call schedule phone visit and left message.      If patient calls back:   Schedule Telephone Visit appointment on 6/27/18, ok to double book for PHONE visit with PCP, document that pt called and close encounter         Jenny Ambrose MA

## 2018-06-27 ENCOUNTER — VIRTUAL VISIT (OUTPATIENT)
Dept: FAMILY MEDICINE | Facility: CLINIC | Age: 48
End: 2018-06-27
Payer: COMMERCIAL

## 2018-06-27 DIAGNOSIS — G89.4 CHRONIC PAIN SYNDROME: ICD-10-CM

## 2018-06-27 DIAGNOSIS — M54.2 NECK PAIN: ICD-10-CM

## 2018-06-27 PROCEDURE — 99441 ZZC PHYSICIAN TELEPHONE EVALUATION 5-10 MIN: CPT | Performed by: FAMILY MEDICINE

## 2018-06-27 RX ORDER — OXYCODONE HYDROCHLORIDE 30 MG/1
TABLET ORAL
Qty: 75 TABLET | Refills: 0 | Status: SHIPPED | OUTPATIENT
Start: 2018-07-06 | End: 2018-07-03

## 2018-06-27 RX ORDER — OXYCODONE HYDROCHLORIDE AND ACETAMINOPHEN 10; 325 MG/1; MG/1
TABLET ORAL
Qty: 60 TABLET | Refills: 0 | Status: SHIPPED | OUTPATIENT
Start: 2018-07-02 | End: 2018-07-16

## 2018-06-27 NOTE — PROGRESS NOTES
Jose Luis,  The prescribed medications are there as expected.  But I am awfully concerned about the alcohol in your system.  As a recovering addict I think you know this is an issue.  I think we will need a long talk about this.    And, here is a note I am sending out to all of my patients on chronic pain medication:    It does not take more than a glance at the news to realize the crisis we are in related to narcotic pain medication.    There are now specific national guidelines in place, and, as such, what we have been doing up until now is no longer acceptable.  It is not safe to be on chronic narcotics for any length of time.  It is now recommended that we attempt to taper all patients off of pain medication completely, reserving its use for short courses following injury, surgery, etc.  If a patient cannot completely taper off medication, the amount needs to be at a much lower, acceptable level.    We can approach this in a couple of ways.  My preference is for you and I to sit down and come up with a written tapering schedule.  The other option is to utilize a pain management specialist for their expertise.      I can assure you that this is done because I have your health and well being in mind; it is not meant to be punitive.  These are well-studied national recommendations.  I realize it is not always easy to change, but I promise it will improve your overall quality of life in the end.      Please let me know how you want to proceed.    JANET Cui M.D.

## 2018-06-27 NOTE — MR AVS SNAPSHOT
After Visit Summary   6/27/2018    Jose Luis Gonzáles    MRN: [id number]           Patient Information     Date Of Birth          1970        Visit Information        Provider Department      6/27/2018 9:00 AM Tonie Cui MD [facility]        Today's Diagnoses     Chronic pain syndrome        Neck pain           Follow-ups after your visit        Your next 10 appointments already scheduled     Jul 27, 2018 11:20 AM CDT   Office Visit with Tonie Cui MD   [facility]    [address] [facility] [address] [phone]           Bring a current list of meds and any records pertaining to this visit. For Physicals, please bring immunization records and any forms needing to be filled out. Please arrive 10 minutes early to complete paperwork.              Who to contact     If you have questions or need follow up information about today's clinic visit or your schedule please contact [facility] directly at [phone].  Normal or non-critical lab and imaging results will be communicated to you by Misohonihart, letter or phone within 4 business days after the clinic has received the results. If you do not hear from us within 7 days, please contact the clinic through [email or web address] or phone. If you have a critical or abnormal lab result, we will notify you by phone as soon as possible.  Submit refill requests through [email or web address] or call your pharmacy and they will forward the refill request to us. Please allow 3 business days for your refill to be completed.          Additional Information About Your Visit        MisohoniharHuoBi Information     [email or web address] gives you secure access to your electronic health record. If you see a primary care provider, you can also send messages to your care team and make appointments. If you have questions, please call your primary care clinic.  If you do not have a primary care  provider, please call [phone] and they will assist you.        Care EveryWhere ID     This is your Care EveryWhere ID. This could be used by other organizations to access your [place] medical records  [id number]         Blood Pressure from Last 3 Encounters:   02/26/18 (!) 140/98   11/14/17 122/80   08/15/17 126/75    Weight from Last 3 Encounters:   02/26/18 76.2 kg (168 lb 1.6 oz)   11/14/17 79.9 kg (176 lb 3.2 oz)   08/15/17 76.2 kg (167 lb 14.4 oz)              Today, you had the following     No orders found for display         Today's Medication Changes          These changes are accurate as of 6/27/18 12:05 PM.  If you have any questions, ask your nurse or doctor.               These medicines have changed or have updated prescriptions.        Dose/Directions    oxyCODONE IR 30 MG tablet   Commonly known as:  ROXICODONE   This may have changed:    - how much to take  - how to take this  - when to take this  - additional instructions  - These instructions start on 7/6/2018. If you are unsure what to do until then, ask your doctor or other care provider.   Used for:  Chronic pain syndrome, Neck pain   Changed by:  Tonie Cui MD        Start taking on:  7/6/2018   1 tab every 4 hours as needed up to 5 per day.   Quantity:  75 tablet   Refills:  0       PERCOCET  MG per tablet   This may have changed:    - how much to take  - how to take this  - when to take this  - additional instructions  - These instructions start on 7/2/2018. If you are unsure what to do until then, ask your doctor or other care provider.   Used for:  Chronic pain syndrome, Neck pain   Generic drug:  oxyCODONE-acetaminophen   Changed by:  Tonie Cui MD        Start taking on:  7/2/2018   1-2 tabs as needed for breakthrough pain.  Max 4 tabs per day.   Quantity:  60 tablet   Refills:  0            Where to get your medicines      Some of these will need a paper prescription and others can be bought over  the counter.  Ask your nurse if you have questions.     Bring a paper prescription for each of these medications     oxyCODONE IR 30 MG tablet    PERCOCET  MG per tablet               Information about OPIOIDS     PRESCRIPTION OPIOIDS: WHAT YOU NEED TO KNOW   We gave you an opioid (narcotic) pain medicine. It is important to manage your pain, but opioids are not always the best choice. You should first try all the other options your care team gave you. Take this medicine for as short a time (and as few doses) as possible.     These medicines have risks:    DO NOT drive when on new or higher doses of pain medicine. These medicines can affect your alertness and reaction times, and you could be arrested for driving under the influence (DUI). If you need to use opioids long-term, talk to your care team about driving.    DO NOT operate heave machinery    DO NOT do any other dangerous activities while taking these medicines.     DO NOT drink any alcohol while taking these medicines.      If the opioid prescribed includes acetaminophen, DO NOT take with any other medicines that contain acetaminophen. Read all labels carefully. Look for the word  acetaminophen  or  Tylenol.  Ask your pharmacist if you have questions or are unsure.    You can get addicted to pain medicines, especially if you have a history of addiction (chemical, alcohol or substance dependence). Talk to your care team about ways to reduce this risk.    Store your pills in a secure place, locked if possible. We will not replace any lost or stolen medicine. If you don t finish your medicine, please throw away (dispose) as directed by your pharmacist. The [place] Pollution Control Agency has more information about safe disposal: https://www.pca.state.[place]/living-green/managing-unwanted-medications.     All opioids tend to cause constipation. Drink plenty of water and eat foods that have a lot of fiber, such as fruits, vegetables, prune juice, apple  juice and high-fiber cereal. Take a laxative (Miralax, milk of magnesia, Colace, Senna) if you don t move your bowels at least every other day.          Primary Care Provider Office Phone # Fax #    Tonie Zach Cui [phone] [address] [facility] [address]        Equal Access to Services     [facility]: Hadii aad ku hadasho Soomaali, waaxda luqadaha, qaybta kaalmada adeegyada, waxay idiin hayaan adeeg kharash la'aan ah. So [facility] [phone].    ATENCIÓN: Si habla español, tiene a ceron disposición servicios gratuitos de asistencia lingüística. Llame al [phone].    We comply with applicable federal civil rights laws and [place] laws. We do not discriminate on the basis of race, color, national origin, age, disability, sex, sexual orientation, or gender identity.            Thank you!     Thank you for choosing [facility]  for your care. Our goal is always to provide you with excellent care. Hearing back from our patients is one way we can continue to improve our services. Please take a few minutes to complete the written survey that you may receive in the mail after your visit with us. Thank you!             Your Updated Medication List - Protect others around you: Learn how to safely use, store and throw away your medicines at www.disposemymeds.org.          This list is accurate as of 6/27/18 12:05 PM.  Always use your most recent med list.                   Brand Name Dispense Instructions for use Diagnosis    adapalene 0.1 % gel    DIFFERIN    45 g    Apply topically At Bedtime    Acne vulgaris       clonazePAM 1 MG tablet    klonoPIN    180 tablet    TAKE 1 TO 2 TABLETS BY MOUTH THREE TIMES DAILY AS NEEDED FOR ANXIETY    Anxiety       diazepam 10 MG tablet    VALIUM    60 tablet    TAKE 1 TABLET BY MOUTH TWICE DAILY AS NEEDED FOR SLEEP    Chronic pain syndrome, Neck pain       finasteride 5 MG tablet    PROSCAR    30 tablet    Take 1 tablet (5 mg)  by mouth daily    Male pattern baldness       mupirocin 2 % ointment    BACTROBAN    22 g    Apply topically 3 times daily    Facial cellulitis       omeprazole 20 MG tablet    priLOSEC OTC    90 tablet    Take 1 tablet (20 mg) by mouth daily    Gastroesophageal reflux disease without esophagitis       order for DME     1 Bottle    Actavis testosterone gel 1% 50 mg daily to intact skin    Male hypogonadism       oxyCODONE IR 30 MG tablet   Start taking on:  7/6/2018    ROXICODONE    75 tablet    1 tab every 4 hours as needed up to 5 per day.    Chronic pain syndrome, Neck pain       PERCOCET  MG per tablet   Generic drug:  oxyCODONE-acetaminophen   Start taking on:  7/2/2018     60 tablet    1-2 tabs as needed for breakthrough pain.  Max 4 tabs per day.    Chronic pain syndrome, Neck pain       polyethylene glycol powder    MIRALAX/GLYCOLAX    527 g    STIR 17 GM OF POWDER (SEE MINGO INSIDE CAP) IN 8-OZ OF LIQUID UNTIL COMPLETELY DISSOLVED. DRINK THE SOLUTION DAILY OR AS DIRECTED.    Chronic constipation       tretinoin 0.1 % cream    RETIN-A    45 g    Apply topically At Bedtime    Acne vulgaris

## 2018-06-27 NOTE — PROGRESS NOTES
"Jose Luis Gonzáles is a 48 year old male who is being evaluated via a telephone visit.      The patient has been notified of following:     \"This telephone visit will be conducted via a call between you and your physician/provider. We have found that certain health care needs can be provided without the need for a physical exam.  This service lets us provide the care you need with a short phone conversation.  If a prescription is necessary we can send it directly to your pharmacy.  If lab work is needed we can place an order for that and you can then stop by our lab to have the test done at a later time.    We will bill your insurance company for this service.  Please check with your medical insurance if this type of visit is covered. You may be responsible for the cost of this type of visit if insurance coverage is denied.  The typical cost is $30 (10min), $59 (11-20min) and $85 (21-30min).  Most often these visits are shorter than 10 minutes.    If during the course of the call the physician/provider feels a telephone visit is not appropriate, you will not be charged for this service.\"       Consent has been obtained for this service by care team member: yes.   See the scanned image in the medical record.    Jose Luis Gonzáles complains of  Recheck Medication      I have reviewed and updated the patient's Past Medical History, Social History, Family History and Medication List.    ALLERGIES  Clonidine; Contrast dye; Ibuprofen; Keflex [cephalexin]; Seroquel [quetiapine]; and Valproic acid    CAW (MA signature)    Additional provider notes:   Spoke with patient via phone regarding issue of chronic pain.  Recent regulatory changes in pharmacy monitoring is brought this issue to light.  Evidently his insurance will no longer cover the dosage he had been on with a maximum of 6 tablets per day.  So this was a good opportunity for Jose Luis and I need to discuss tapering back or even off his pain medication.  He says he is " willing to entertain this.  Long-standing history of chronic neck and back pain, but also a history of substance use disorder.  He has been through treatment but obviously he is high risk for developing dependency.  We discussed a slow taper back of his medications.  One advantage is that he has always filled his prescriptions every 2 weeks so that we can keep a close eye on this.  I was a bit concerned about the presence of alcohol and a recent urinary drug screen.  He said this came after a night of going out with friends.  He does have a sponsor for his addiction issues and has kept up with him.    ASSESSMENT / PLAN:  (G89.4) Chronic pain syndrome  Comment: His previous prescription was 200 mg oxycodone daily.  We will make sure there is a Narcan prescription in place.  Controlled substance agreement in place and updated with routine monitoring.  We will decrease him to a maximum of 190 mg daily by changing to 5 of his 30 mg tablets and up to 4 of the Percocet 10 for breakthrough.  Plan to decrease in 2 weeks  Plan: oxyCODONE IR (ROXICODONE) 30 MG tablet,         PERCOCET  MG per tablet            (M54.2) Neck pain  Comment: As above  Plan: oxyCODONE IR (ROXICODONE) 30 MG tablet,         PERCOCET  MG per tablet             I have reviewed the note as documented above.  This accurately captures the substance of my conversation with the patient,  JANET Cui M.D.     Total time of call between patient and provider was 8 minutes

## 2018-06-28 ENCOUNTER — NURSE TRIAGE (OUTPATIENT)
Dept: NURSING | Facility: CLINIC | Age: 48
End: 2018-06-28

## 2018-06-28 NOTE — TELEPHONE ENCOUNTER
"  Additional Information    Negative: Drug overdose and nurse unable to answer question    Negative: Caller requesting information not related to medicine    Negative: Caller requesting a prescription for Strep throat and has a positive culture result    Negative: Rash while taking a medication or within 3 days of stopping it    Negative: Immunization reaction suspected    Negative: [1] Asthma and [2] having symptoms of asthma (cough, wheezing, etc)    Negative: MORE THAN A DOUBLE DOSE of a prescription or over-the-counter (OTC) drug    Negative: [1] DOUBLE DOSE (an extra dose or lesser amount) of over-the-counter (OTC) drug AND [2] any symptoms (e.g., dizziness, nausea, pain, sleepiness)    Negative: [1] DOUBLE DOSE (an extra dose or lesser amount) of prescription drug AND [2] any symptoms (e.g., dizziness, nausea, pain, sleepiness)    Negative: Took another person's prescription drug    Negative: [1] DOUBLE DOSE (an extra dose or lesser amount) of prescription drug AND [2] NO symptoms (Exception: a double dose of antibiotics)    Negative: Diabetes drug error or overdose (e.g., insulin or extra dose)    Negative: [1] Request for URGENT new prescription or refill of \"essential\" medication (i.e., likelihood of harm to patient if not taken) AND [2] triager unable to fill per unit policy    Negative: [1] Prescription not at pharmacy AND [2] was prescribed today by PCP    Negative: Pharmacy calling with prescription questions and triager unable to answer question    Negative: Caller has URGENT medication question about med that PCP prescribed and triager unable to answer question    Negative: Caller has NON-URGENT medication question about med that PCP prescribed and triager unable to answer question    Negative: Caller requesting a NON-URGENT new prescription or refill and triager unable to refill per unit policy    Negative: Caller has medication question about med not prescribed by PCP and triager unable to answer " question (e.g., compatibility with other med, storage)    Negative: [1] DOUBLE DOSE (an extra dose or lesser amount) of over-the-counter (OTC) drug AND [2] NO symptoms (all triage questions negative)    Negative: [1] DOUBLE DOSE (an extra dose or lesser amount) of antibiotic drug AND [2] NO symptoms (all triage questions negative)    Caller has medication question only, adult not sick, and triager answers question    Protocols used: MEDICATION QUESTION CALL-ADULT-    Patient calling to find out which prescriptions his doctor had prescribed and when they will be available.  Writer informed patient that the percocet prescription will be available on 7/2/18 and that the oxycodone will be available on 7/6/18 and that both scripts will need to be picked up in office.  Patient verbalized understanding and denied further questions.    Cassandra Villaseñor RN  [facility]

## 2018-06-30 ENCOUNTER — NURSE TRIAGE (OUTPATIENT)
Dept: NURSING | Facility: CLINIC | Age: 48
End: 2018-06-30

## 2018-07-03 DIAGNOSIS — M54.2 NECK PAIN: ICD-10-CM

## 2018-07-03 DIAGNOSIS — G89.4 CHRONIC PAIN SYNDROME: ICD-10-CM

## 2018-07-06 ENCOUNTER — TELEPHONE (OUTPATIENT)
Dept: FAMILY MEDICINE | Facility: CLINIC | Age: 48
End: 2018-07-06

## 2018-07-06 NOTE — TELEPHONE ENCOUNTER
Reason for Call:  Other prescription    Detailed comments: Patient called said he is at his [place] home and needs more medication. Would not go into any detail said he  Would speak to nurse advisor. Please call back and advise.    Phone Number Patient can be reached at: Cell number on file:    Telephone Information:   Mobile [phone]     Best Time: any    Can we leave a detailed message on this number? YES    Call taken on 7/6/2018 at 1:44 PM by Phoebe Corley

## 2018-07-06 NOTE — TELEPHONE ENCOUNTER
This writer attempted to contact pt on 07/06/18      Reason for call med question and left message.      If patient calls back:   Patient contacted by [facility] Team (MA/TC). Inform patient that someone from the team will contact them, document that pt called and route to care team.         Ashley Disla, [facility]

## 2018-07-11 ENCOUNTER — NURSE TRIAGE (OUTPATIENT)
Dept: NURSING | Facility: CLINIC | Age: 48
End: 2018-07-11

## 2018-07-11 ENCOUNTER — TELEPHONE (OUTPATIENT)
Dept: FAMILY MEDICINE | Facility: CLINIC | Age: 48
End: 2018-07-11

## 2018-07-11 DIAGNOSIS — M54.2 NECK PAIN: ICD-10-CM

## 2018-07-11 DIAGNOSIS — G89.4 CHRONIC PAIN SYNDROME: ICD-10-CM

## 2018-07-11 DIAGNOSIS — K59.09 CHRONIC CONSTIPATION: ICD-10-CM

## 2018-07-11 RX ORDER — OXYCODONE HYDROCHLORIDE AND ACETAMINOPHEN 10; 325 MG/1; MG/1
TABLET ORAL
Qty: 60 TABLET | Refills: 0 | OUTPATIENT
Start: 2018-07-11

## 2018-07-11 NOTE — TELEPHONE ENCOUNTER
.Reason for Call:  Other call back    Detailed comments: patient called and he would like a call back regarding previous call.     Phone Number Patient can be reached at: Cell number on file:    Telephone Information:   Mobile [phone]       Best Time: any    Can we leave a detailed message on this number? YES    Call taken on 7/11/2018 at 12:13 PM by Lenard Jeffers

## 2018-07-11 NOTE — TELEPHONE ENCOUNTER
Clinic Action Needed: Yes please call Jose Luis [phone]    Reason for Call: Patient calling requesting PERCOCET  MG per tablet by 7/16/2018- Last filled 7/2/2018 60 tablets (NO Refill). Last virtual visit on 6/27/2018: Patient also requesting Roxicodone 30mg by 7/20/2018. Requesting call back from PCP if possible.      Routed to: [place] [facility] [place].     Kevin Beckwith, RN  [facility]

## 2018-07-11 NOTE — TELEPHONE ENCOUNTER
Clinic Action Needed: Yes please call Jose Luis [phone]    Reason for Call: Patient calling requesting PERCOCET  MG per tablet by 7/16/2018- Last filled 7/2/2018 60 tablets (NO Refill). Last virtual visit on 6/27/2018: Patient also requesting Roxicodone 30mg by 7/20/2018. Requesting call back from PCP if possible.      Routed to: [place] [facility] [place].     Kevin Beckwith, RN  [facility]

## 2018-07-11 NOTE — TELEPHONE ENCOUNTER
Reason for Call:  Other prescription    Detailed comments: Pt calling for he would like a 's only phone call from Dr. Cui to address his medication concern for he has further questions regarding this matter that he would like to address.     Phone Number Patient can be reached at: Home number on file [phone] (home)    Best Time: anytime    Can we leave a detailed message on this number? YES    Call taken on 7/11/2018 at 11:48 AM by Ashok Forde

## 2018-07-12 NOTE — TELEPHONE ENCOUNTER
Please call and advise that Dr. Cui is out of the office until Monday.  May wait until Monday.  If wants one of covering providers to address- needs to discuss concerns with staff.  [place]  and several prescriptions by Dr. Cui  - most recent 7/6/18 Given recent refills I am not comfortable prescribing any additional narcotics

## 2018-07-16 RX ORDER — OXYCODONE HYDROCHLORIDE 30 MG/1
TABLET ORAL
Qty: 75 TABLET | Refills: 0 | Status: SHIPPED | OUTPATIENT
Start: 2018-07-20 | End: 2018-07-31

## 2018-07-16 RX ORDER — OXYCODONE HYDROCHLORIDE AND ACETAMINOPHEN 10; 325 MG/1; MG/1
TABLET ORAL
Qty: 42 TABLET | Refills: 0 | Status: SHIPPED | OUTPATIENT
Start: 2018-07-17 | End: 2018-07-25

## 2018-07-16 NOTE — TELEPHONE ENCOUNTER
Can [facility] give a call when he can  Patient has some meds due today so he would like you to write those   Up and his mom Evette will be getting them for him  [phone]

## 2018-07-16 NOTE — TELEPHONE ENCOUNTER
Reason for Call:  Other returning call    Detailed comments: 2:27 PM 7/16 Said Ashley called he is returning her call.    Phone Number Patient can be reached at: Cell number on file:    Telephone Information:   Mobile [phone]     Best Time: any    Can we leave a detailed message on this number? YES    Call taken on 7/16/2018 at 2:28 PM by Phoebe Corley

## 2018-07-16 NOTE — TELEPHONE ENCOUNTER
Patient's mom Evette stopped by the clinic today to  script.  Script was not ready to be picked up.  Told to wait for a call when script is ready for .

## 2018-07-16 NOTE — TELEPHONE ENCOUNTER
Patient's mom Evette said that patient has enough for today and will be out of percocet tomorrow. Worried about patient going into withdrawal if out of medication. Requesting script by tomorrow.

## 2018-07-17 NOTE — TELEPHONE ENCOUNTER
rxs will be ready Tuesday - the taper back will continue  (down to 180 mg daily)    OK for mom to

## 2018-07-17 NOTE — TELEPHONE ENCOUNTER
..Reason for Call:    prescriptions    Detailed comments: checking on the scripts, and the dosages;   (approved by [employer])  Clarify scripts being ready today, per note it states they'd be ready Tues. The 17th or 24th?   Appointment on the 27th,     Phone Number Patient can be reached at: Home number on file [phone] (home) [phone]  Best Time: anytime    Can we leave a detailed message on this number? YES    Call taken on 7/17/2018 at 9:42 AM by Clara Krueger

## 2018-07-25 ENCOUNTER — TELEPHONE (OUTPATIENT)
Dept: FAMILY MEDICINE | Facility: CLINIC | Age: 48
End: 2018-07-25

## 2018-07-25 DIAGNOSIS — M54.2 NECK PAIN: ICD-10-CM

## 2018-07-25 DIAGNOSIS — G89.4 CHRONIC PAIN SYNDROME: ICD-10-CM

## 2018-07-25 RX ORDER — OXYCODONE HYDROCHLORIDE AND ACETAMINOPHEN 10; 325 MG/1; MG/1
1 TABLET ORAL 2 TIMES DAILY PRN
Qty: 30 TABLET | Refills: 0 | Status: SHIPPED | OUTPATIENT
Start: 2018-07-25 | End: 2018-07-31

## 2018-07-25 NOTE — TELEPHONE ENCOUNTER
Prescription for percocet 10 printed - that is the one he'll need Monday.  We will continue the taper.

## 2018-07-25 NOTE — TELEPHONE ENCOUNTER
Patient is asking for a quick call regarding his refills  His appointment this Friday got rescheduled until next tues  He wont have enough meds until then  Can you do a quick call and you can bill him for a phone visit if you need to     [phone]

## 2018-07-31 ENCOUNTER — TELEPHONE (OUTPATIENT)
Dept: PALLIATIVE MEDICINE | Facility: CLINIC | Age: 48
End: 2018-07-31

## 2018-07-31 ENCOUNTER — OFFICE VISIT (OUTPATIENT)
Dept: FAMILY MEDICINE | Facility: CLINIC | Age: 48
End: 2018-07-31
Payer: COMMERCIAL

## 2018-07-31 VITALS
WEIGHT: 166.9 LBS | TEMPERATURE: 98 F | BODY MASS INDEX: 23.89 KG/M2 | HEART RATE: 104 BPM | HEIGHT: 70 IN | RESPIRATION RATE: 16 BRPM | OXYGEN SATURATION: 90 % | SYSTOLIC BLOOD PRESSURE: 142 MMHG | DIASTOLIC BLOOD PRESSURE: 92 MMHG

## 2018-07-31 DIAGNOSIS — G89.4 CHRONIC PAIN SYNDROME: ICD-10-CM

## 2018-07-31 DIAGNOSIS — F41.9 ANXIETY: ICD-10-CM

## 2018-07-31 DIAGNOSIS — M54.2 NECK PAIN: ICD-10-CM

## 2018-07-31 PROCEDURE — 99214 OFFICE O/P EST MOD 30 MIN: CPT | Performed by: FAMILY MEDICINE

## 2018-07-31 RX ORDER — OXYCODONE HYDROCHLORIDE 30 MG/1
TABLET ORAL
Qty: 75 TABLET | Refills: 0 | Status: SHIPPED | OUTPATIENT
Start: 2018-08-03 | End: 2018-08-17

## 2018-07-31 RX ORDER — DIAZEPAM 10 MG
TABLET ORAL
Qty: 60 TABLET | Refills: 0 | Status: SHIPPED | OUTPATIENT
Start: 2018-07-31 | End: 2018-09-05

## 2018-07-31 RX ORDER — OXYCODONE AND ACETAMINOPHEN 5; 325 MG/1; MG/1
1 TABLET ORAL 3 TIMES DAILY
Qty: 45 TABLET | Refills: 0 | Status: SHIPPED | OUTPATIENT
Start: 2018-08-08 | End: 2018-08-17

## 2018-07-31 RX ORDER — CLONAZEPAM 1 MG/1
TABLET ORAL
Qty: 180 TABLET | Refills: 0 | Status: SHIPPED | OUTPATIENT
Start: 2018-07-31 | End: 2018-09-05

## 2018-07-31 ASSESSMENT — PAIN SCALES - GENERAL: PAINLEVEL: EXTREME PAIN (9)

## 2018-07-31 NOTE — MR AVS SNAPSHOT
After Visit Summary   7/31/2018    Jose Luis Christoph Gonzáles    MRN: [id number]           Patient Information     Date Of Birth          1970        Visit Information        Provider Department      7/31/2018 1:00 PM Tonie Cui MD [facility]        Today's Diagnoses     Chronic pain syndrome        Neck pain        Anxiety          Care Instructions    At [facility], we strive to deliver an exceptional experience to you, every time we see you.  If you receive a survey in the mail, please send us back your thoughts. We really do value your feedback.    Based on your medical history, these are the current health maintenance/preventive care services that you are due for (some may have been done at this visit.)  Health Maintenance Due   Topic Date Due     EYE EXAM Q1 YEAR  12/20/2017       Suggested websites for health information:  Www.Eco-Vacay : Up to date and easily searchable information on multiple topics.  Www.Kodkod.gov : medication info, interactive tutorials, watch real surgeries online  Www.familydoctor.org : good info from the Academy of Family Physicians  Www.cdc.gov : public health info, travel advisories, epidemics (H1N1)  Www.aap.org : children's health info, normal development, vaccinations  Www.health.state[place] : [place] dept of health, public health issues in [place], N1N1    Your care team:                            Family Medicine Internal Medicine   MD Paul Ramírez MD Shantel Branch-Fleming, MD Katya Georgiev PA-C Megan Hill, JULI Polanco MD Pediatrics   LISA Myers, MD Tere Esquivel APRN CNP   MD Agustina Young MD Deborah Mielke, MD Kim Thein, APRN [facility]      Clinic hours: Monday - Thursday 7 am-7 pm; Fridays 7 am-5 pm.   Urgent care: Monday - Friday 11 am-9 pm; Saturday and Sunday 9 am-5 pm.  Pharmacy : Monday -Thursday 8 am-8 pm; Friday 8  am-6 pm; Saturday and Sunday 9 am-5 pm.     Clinic: [phone]   Pharmacy: [phone]              Follow-ups after your visit        Additional Services     PAIN MANAGEMENT REFERRAL       Your provider has referred you to: [facility] [place], [place]: [facility] -    Reason for Referral: Evaluation for comprehensive services- patients will be evaluated if appropriate for comprehensive service including medication changes, procedures, pain psychology, and pain physical therapy.  While involved with comprehensive services, pain providers will work with referring provider/PCP to stabilize appropriate medication management, with long-term plan of transition of prescribing back to referring provider/PCP upon completion of comprehensive services.      Please complete the following questions:    Do you have any specific questions for the pain specialist? No    Are there any red flags that may impact the assessment or management of the patient? None      What is your diagnosis for the patient's pain? Chronic neck and back pain, high risk dosage      For any questions, contact the [facility] [place] at [phone].     **ANY DIAGNOSTIC TESTS THAT ARE NOT IN EPIC SHOULD BE SENT TO THE PAIN CENTER**    REGARDING OPIOID MEDICATIONS:  The discussion of opioids management, appropriateness of therapy, and dosing will be discussed in patients being seen for evaluation.  The pain management clinics are not long-term prescribing clinics, with transition of prescribing of medications ultimately going back to the referring provider/PCP.  If prescribing is taken over at the pain clinic, it is in actively involved patients whom are appropriate for opioids, urine drug screening is completed, and long-term prescribing plan has been determined.  Therefore, we will not be automatically taking over prescribing at the patient's first visit.  Is this agreeable to you? agrees.     Please be aware that coverage of these  services is subject to the terms and limitations of your health insurance plan.  Call member services at your health plan with any benefit or coverage questions.      Please bring the following with you to your appointment:    (1) Any X-Rays, CTs or MRIs which have been performed.  Contact the facility where they were done to arrange for  prior to your scheduled appointment.    (2) List of current medications   (3) This referral request   (4) Any documents/labs given to you for this referral                  Follow-up notes from your care team     Return in about 1 month (around 8/31/2018).      Who to contact     If you have questions or need follow up information about today's clinic visit or your schedule please contact [facility] directly at [phone].  Normal or non-critical lab and imaging results will be communicated to you by MyChart, letter or phone within 4 business days after the clinic has received the results. If you do not hear from us within 7 days, please contact the clinic through [facility] [employer] or phone. If you have a critical or abnormal lab result, we will notify you by phone as soon as possible.  Submit refill requests through [facility] or call your pharmacy and they will forward the refill request to us. Please allow 3 business days for your refill to be completed.          Additional Information About Your Visit        OurHouse Information     Our[facility] gives you secure access to your electronic health record. If you see a primary care provider, you can also send messages to your care team and make appointments. If you have questions, please call your primary care clinic.  If you do not have a primary care provider, please call [phone] and they will assist you.        Care EveryWhere ID     This is your Care EveryWhere ID. This could be used by other organizations to access your [facility] medical records  [id number]        Your Vitals Were     Pulse Temperature Respirations  "Height Pulse Oximetry BMI (Body Mass Index)    104 98  F (36.7  C) (Oral) 16 1.778 m (5' 10\") 90% 23.95 kg/m2       Blood Pressure from Last 3 Encounters:   07/31/18 (!) 142/92   02/26/18 (!) 140/98   11/14/17 122/80    Weight from Last 3 Encounters:   07/31/18 75.7 kg (166 lb 14.4 oz)   02/26/18 76.2 kg (168 lb 1.6 oz)   11/14/17 79.9 kg (176 lb 3.2 oz)              We Performed the Following     PAIN MANAGEMENT REFERRAL          Today's Medication Changes          These changes are accurate as of 7/31/18  2:10 PM.  If you have any questions, ask your nurse or doctor.               Start taking these medicines.        Dose/Directions    oxyCODONE-acetaminophen 5-325 MG per tablet   Commonly known as:  PERCOCET   Used for:  Chronic pain syndrome, Neck pain   Replaces:  PERCOCET  MG per tablet   Started by:  Tonie Cui MD        Dose:  1 tablet   Start taking on:  8/8/2018   Take 1 tablet by mouth 3 times daily   Quantity:  45 tablet   Refills:  0         These medicines have changed or have updated prescriptions.        Dose/Directions    oxyCODONE IR 30 MG tablet   Commonly known as:  ROXICODONE   This may have changed:  These instructions start on 8/3/2018. If you are unsure what to do until then, ask your doctor or other care provider.   Used for:  Chronic pain syndrome, Neck pain   Changed by:  Tonie Cui MD        Start taking on:  8/3/2018   1 tab every 4 hours as needed up to 5 per day.   Quantity:  75 tablet   Refills:  0         Stop taking these medicines if you haven't already. Please contact your care team if you have questions.     PERCOCET  MG per tablet   Generic drug:  oxyCODONE-acetaminophen   Replaced by:  oxyCODONE-acetaminophen 5-325 MG per tablet   Stopped by:  Tonie Cui MD                Where to get your medicines      Some of these will need a paper prescription and others can be bought over the counter.  Ask your nurse if you have " questions.     Bring a paper prescription for each of these medications     clonazePAM 1 MG tablet    diazepam 10 MG tablet    oxyCODONE IR 30 MG tablet    oxyCODONE-acetaminophen 5-325 MG per tablet               Information about OPIOIDS     PRESCRIPTION OPIOIDS: WHAT YOU NEED TO KNOW   We gave you an opioid (narcotic) pain medicine. It is important to manage your pain, but opioids are not always the best choice. You should first try all the other options your care team gave you. Take this medicine for as short a time (and as few doses) as possible.     These medicines have risks:    DO NOT drive when on new or higher doses of pain medicine. These medicines can affect your alertness and reaction times, and you could be arrested for driving under the influence (DUI). If you need to use opioids long-term, talk to your care team about driving.    DO NOT operate heave machinery    DO NOT do any other dangerous activities while taking these medicines.     DO NOT drink any alcohol while taking these medicines.      If the opioid prescribed includes acetaminophen, DO NOT take with any other medicines that contain acetaminophen. Read all labels carefully. Look for the word  acetaminophen  or  Tylenol.  Ask your pharmacist if you have questions or are unsure.    You can get addicted to pain medicines, especially if you have a history of addiction (chemical, alcohol or substance dependence). Talk to your care team about ways to reduce this risk.    Store your pills in a secure place, locked if possible. We will not replace any lost or stolen medicine. If you don t finish your medicine, please throw away (dispose) as directed by your pharmacist. The [place] Pollution Control Agency has more information about safe disposal: https://www.pca.[facility].[place]/living-green/managing-unwanted-medications.     All opioids tend to cause constipation. Drink plenty of water and eat foods that have a lot of fiber, such as fruits,  vegetables, prune juice, apple juice and high-fiber cereal. Take a laxative (Miralax, milk of magnesia, Colace, Senna) if you don t move your bowels at least every other day.          Primary Care Provider Office Phone # Fax #    Tonie Zach Cui [phone] [address] [facility] [address]        Equal Access to Services     [facility] Hospital[facility]OM[facility] : Hadii aad ku hadasho Soomaali, waaxda luqadaha, qaybta kaalmada adeegyada, waxay idiin hayaan adeeg kharash la'concepciónn . So [facility] [phone].    ATENCIÓN: Si del hernandez, tiene a ceron disposición servicios gratuitos de asistencia lingüística. Llame al [phone].    We comply with applicable federal civil rights laws and [place] laws. We do not discriminate on the basis of race, color, national origin, age, disability, sex, sexual orientation, or gender identity.            Thank you!     Thank you for choosing [facility]  for your care. Our goal is always to provide you with excellent care. Hearing back from our patients is one way we can continue to improve our services. Please take a few minutes to complete the written survey that you may receive in the mail after your visit with us. Thank you!             Your Updated Medication List - Protect others around you: Learn how to safely use, store and throw away your medicines at www.disposemymeds.org.          This list is accurate as of 7/31/18  2:10 PM.  Always use your most recent med list.                   Brand Name Dispense Instructions for use Diagnosis    adapalene 0.1 % gel    DIFFERIN    45 g    Apply topically At Bedtime    Acne vulgaris       clonazePAM 1 MG tablet    klonoPIN    180 tablet    TAKE 1 TO 2 TABLETS BY MOUTH THREE TIMES DAILY AS NEEDED FOR ANXIETY    Anxiety       diazepam 10 MG tablet    VALIUM    60 tablet    TAKE 1 TABLET BY MOUTH TWICE DAILY AS NEEDED FOR SLEEP    Chronic pain syndrome, Neck pain       finasteride 5 MG tablet    PROSCAR    30  tablet    Take 1 tablet (5 mg) by mouth daily    Male pattern baldness       naloxone nasal spray    NARCAN    0.2 mL    Spray 1 spray (4 mg) into one nostril alternating nostrils as needed for opioid reversal every 2-3 minutes until assistance arrives    Chronic pain syndrome, Neck pain       omeprazole 20 MG tablet    priLOSEC OTC    90 tablet    Take 1 tablet (20 mg) by mouth daily    Gastroesophageal reflux disease without esophagitis       oxyCODONE IR 30 MG tablet   Start taking on:  8/3/2018    ROXICODONE    75 tablet    1 tab every 4 hours as needed up to 5 per day.    Chronic pain syndrome, Neck pain       oxyCODONE-acetaminophen 5-325 MG per tablet   Start taking on:  8/8/2018    PERCOCET    45 tablet    Take 1 tablet by mouth 3 times daily    Chronic pain syndrome, Neck pain       polyethylene glycol powder    MIRALAX/GLYCOLAX    527 g    STIR 17 GM OF POWDER (SEE MINGO INSIDE CAP) IN 8-OZ OF LIQUID UNTIL COMPLETELY DISSOLVED. DRINK THE SOLUTION DAILY OR AS DIRECTED.    Chronic constipation       tretinoin 0.1 % cream    RETIN-A    45 g    Apply topically At Bedtime    Acne vulgaris

## 2018-07-31 NOTE — PATIENT INSTRUCTIONS
At [facility], we strive to deliver an exceptional experience to you, every time we see you.  If you receive a survey in the mail, please send us back your thoughts. We really do value your feedback.    Based on your medical history, these are the current health maintenance/preventive care services that you are due for (some may have been done at this visit.)  Health Maintenance Due   Topic Date Due     EYE EXAM Q1 YEAR  12/20/2017       Suggested websites for health information:  Www.Community HealthMDconnectME.org : Up to date and easily searchable information on multiple topics.  Www.medlineplus.gov : medication info, interactive tutorials, watch real surgeries online  Www.familydoctor.org : good info from the Academy of Family Physicians  Www.cdc.gov : public health info, travel advisories, epidemics (H1N1)  Www.aap.org : children's health info, normal development, vaccinations  Www.health.[facility].[place].us : [place] dept of health, public health issues in [place], N1N1    Your care team:                            Family Medicine Internal Medicine   MD Paul Ramírez MD Shantel Branch-Fleming, MD Katya Georgiev PA-C Megan Hill, APRN CNP    Hadley Polanco MD Pediatrics   Hamlet Funez, PA-C  Natasha Ruby, CNP MD Tere Reynoso APRN CNP   MD Agustina Young MD Deborah Mielke, MD Kim Thein, APRN CNP      Clinic hours: Monday - Thursday 7 am-7 pm; Fridays 7 am-5 pm.   Urgent care: Monday - Friday 11 am-9 pm; Saturday and Sunday 9 am-5 pm.  Pharmacy : Monday -Thursday 8 am-8 pm; Friday 8 am-6 pm; Saturday and Sunday 9 am-5 pm.     Clinic: [phone]   Pharmacy: [phone]

## 2018-07-31 NOTE — PROGRESS NOTES
"  SUBJECTIVE:   Jose Luis Gonzáles is a 48 year old male who presents to clinic today for the following health issues:      Result review and Medication discuss    SUBJECTIVE:  Here today in follow-up of chronic pain.  Long-standing patient of mine and we set up our expectations for tapering back to a safer level at a previous virtual visit.  This is her first meeting face-to-face about it.  I discussed with the patient currently considered safe limits of less than 90 MED on a daily basis.  Patient began this process 3 times that limit and we have decreased 10 mg every 2 weeks.  He does admit that he is having some trouble with withdrawal and also concerned regarding decreases in the future.  Still having quite a bit of pain neck and back and recently had significant dental work.  But I discussed that we cannot go backwards by prescribing breakthrough amounts for minor procedures when he has such a high dosage to begin.    Review of systems otherwise negative.  Past medical, family, and social history reviewed and updated in chart.    OBJECTIVE:  BP (!) 142/92  Pulse 104  Temp 98  F (36.7  C) (Oral)  Resp 16  Ht 1.778 m (5' 10\")  Wt 75.7 kg (166 lb 14.4 oz)  SpO2 90%  BMI 23.95 kg/m2  Alert, pleasant, upbeat, and in no apparent discomfort.   S1 and S2 normal, no murmurs, clicks, gallops or rubs. Regular rate and rhythm. Chest is clear; no wheezes or rales. No edema or JVD.  Past labs reviewed with the patient.     ASSESSMENT / PLAN:  (G89.4) Chronic pain syndrome  Comment: Discussion as above with the patient.  We will slowly taper down for the next 2 weeks cycle by decreasing movement 5 mg daily, maintaining his 30 mg oxycodone tablets 5 times daily and changing to Percocet 5 mg tablets, but utilizing three times daily dosing.  Problem list is updated with plan and current dosage.   database reviewed.  I have significant doubt, however, that the patient will be able to tolerate this overall decrease.  I " discussed this directly with him.  To this extent I am going to place a referral to pain management as I know it takes some time for patients to get scheduled.  The patient's biggest fear is losing me as a primary provider and I discussed with him that that will certainly not be the case.  We are just looking for expert help in a safer management plan.  Plan: oxyCODONE IR (ROXICODONE) 30 MG tablet,         oxyCODONE-acetaminophen (PERCOCET) 5-325 MG per        tablet, PAIN MANAGEMENT REFERRAL            (M54.2) Neck pain  Comment:   Plan: oxyCODONE IR (ROXICODONE) 30 MG tablet,         oxyCODONE-acetaminophen (PERCOCET) 5-325 MG per        tablet, PAIN MANAGEMENT REFERRAL    25 minutes spent in face-to-face time during this visit.  25 minutes were spent on counseling and coordinating care.           Follow up 1 month   JANET Cui MD    (Chart documentation completed in part with Dragon voice-recognition software.  Even though reviewed some grammatical, spelling, and word errors may remain.)

## 2018-08-01 NOTE — TELEPHONE ENCOUNTER
Pain Management Center Referral      1. Confirmed address with patient? Yes  2. Confirmed phone number with patient? Yes  3. Confirmed referring provider? Yes  4. Is the PCP the same as the referring provider? Yes  5. Has the patient been to any previous pain clinics? Yes - [facility]  (If yes, send VERNON with welcome letter)  6. Which insurance are we to bill for this appointment?  BCBS    7. Informed pt of cancellation (48 hour) policy? Yes    REGARDING OPIOID MEDICATIONS: We will always address appropriateness of opioid pain medications, but we generally will not automatically take on a prescribing role. When we do take on prescribing of opioids for chronic pain, it is in collaboration with the referring physician for an intermediate period of time (months), with an expectation that the primary physician or provider will assume the prescribing role if medications are effective at stable doses with demonstrated compliance. Therefore, please do not assume that your prescribing responsibilities end on the day of pain clinic consultation.  7. Informed pt of prescribing policy? Yes      8. Referring Provider: Tonie Cui    [facility]

## 2018-08-13 ENCOUNTER — TELEPHONE (OUTPATIENT)
Dept: FAMILY MEDICINE | Facility: CLINIC | Age: 48
End: 2018-08-13

## 2018-08-13 DIAGNOSIS — M54.2 NECK PAIN: ICD-10-CM

## 2018-08-13 DIAGNOSIS — G89.4 CHRONIC PAIN SYNDROME: ICD-10-CM

## 2018-08-13 NOTE — TELEPHONE ENCOUNTER
Reason for Call:  Other call back    Detailed comments: Pt calling for he is unable to get into the Pain Management Clinic tdoday and would like a call back for he has questions or concerns he would like to address with Dr. Cui.    Phone Number Patient can be reached at: Home number on file [phone] (home)    Best Time: anytime    Can we leave a detailed message on this number? YES    Call taken on 8/13/2018 at 11:36 AM by Ashok Forde

## 2018-08-13 NOTE — TELEPHONE ENCOUNTER
"Patient requesting for refill on Oxycodone IR. I clarified with him that he should not be out of that medication yet. He said he will run out soon and needs a refill.   Oxycodone 5mg was recently filled as well. Patient reported he does not take Oxycodone 5mg - \"there's blue dye in there and I do not take those ones\".       LXIONG3, MEDICAL ASSISTANT       "

## 2018-08-13 NOTE — TELEPHONE ENCOUNTER
Should not be out of pain medications yet since given 45 percocet on  8/8/18 with 3 tablets a day.  Will need to be approved or denied by PCP since on a taper.  Please call and advise will need to wait until Dr. Cui can address later this week  Route back to PCP after informed and not to pool

## 2018-08-15 ENCOUNTER — NURSE TRIAGE (OUTPATIENT)
Dept: NURSING | Facility: CLINIC | Age: 48
End: 2018-08-15

## 2018-08-15 NOTE — TELEPHONE ENCOUNTER
Please see 8/13 ANTHONY Fernández RN/SHAYNA    Reason for Disposition    [1] Caller requesting NON-URGENT health information AND [2] PCP's office is the best resource    Additional Information    Negative: [1] Caller is not with the adult (patient) AND [2] reporting urgent symptoms    Negative: Lab result questions    Negative: Medication questions    Negative: Caller cannot be reached by phone    Negative: Caller has already spoken to PCP or another triager    Negative: RN needs further essential information from caller in order to complete triage    Negative: Requesting regular office appointment    Protocols used: IN[facility]

## 2018-08-15 NOTE — TELEPHONE ENCOUNTER
Pt calling back about refill on Percocet. Advised pt there has not been any response yet from Dr. Cui regarding this request. Advised pt call clinic when they are open 8am-5pm and then he can talk directly to Dr Cui's team. Pt voiced understanding and agreement.

## 2018-08-16 ENCOUNTER — TELEPHONE (OUTPATIENT)
Dept: FAMILY MEDICINE | Facility: CLINIC | Age: 48
End: 2018-08-16

## 2018-08-16 DIAGNOSIS — I10 HYPERTENSION GOAL BP (BLOOD PRESSURE) < 140/90: ICD-10-CM

## 2018-08-16 DIAGNOSIS — M54.2 NECK PAIN: ICD-10-CM

## 2018-08-16 DIAGNOSIS — G89.4 CHRONIC PAIN SYNDROME: Primary | ICD-10-CM

## 2018-08-16 NOTE — TELEPHONE ENCOUNTER
Reason for Call:  Other prescription    Detailed comments: Pt calling for he would like to know if there are any other medication detox options for oxycodone that he could take since Pt has said he has had difficulty obtaining his Oxycodone medications.  He would like a call back for further advisement on this matter .      Phone Number Patient can be reached at: Home number on file [phone] (home)    Best Time: anytime    Can we leave a detailed message on this number? YES    Call taken on 8/16/2018 at 10:59 AM by Ashok Forde

## 2018-08-17 RX ORDER — OXYCODONE AND ACETAMINOPHEN 5; 325 MG/1; MG/1
1 TABLET ORAL 2 TIMES DAILY
Qty: 30 TABLET | Refills: 0 | Status: SHIPPED | OUTPATIENT
Start: 2018-08-17 | End: 2018-08-29

## 2018-08-17 RX ORDER — CLONIDINE HYDROCHLORIDE 0.1 MG/1
0.1 TABLET ORAL 2 TIMES DAILY
Qty: 60 TABLET | Refills: 2 | Status: SHIPPED | OUTPATIENT
Start: 2018-08-17 | End: 2018-11-24

## 2018-08-17 RX ORDER — OXYCODONE HYDROCHLORIDE 30 MG/1
TABLET ORAL
Qty: 75 TABLET | Refills: 0 | Status: SHIPPED | OUTPATIENT
Start: 2018-08-17 | End: 2018-08-29
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
## 2018-08-17 NOTE — TELEPHONE ENCOUNTER
Refills are ready Friday.  [facility] database reviewed.   He is to continue 30 mg 5x / day plus percocet 5 mg.  But we are decreasing that percocet down to twice daily for the next two weeks.

## 2018-08-17 NOTE — TELEPHONE ENCOUNTER
A good choice may be to add a small dose of clonidine to help with both the withdrawal and his BP.    I'll have that in basket as well

## 2018-08-20 ENCOUNTER — OFFICE VISIT (OUTPATIENT)
Dept: PALLIATIVE MEDICINE | Facility: CLINIC | Age: 48
End: 2018-08-20
Payer: COMMERCIAL

## 2018-08-20 VITALS — OXYGEN SATURATION: 96 % | HEART RATE: 72 BPM | DIASTOLIC BLOOD PRESSURE: 80 MMHG | SYSTOLIC BLOOD PRESSURE: 116 MMHG

## 2018-08-20 DIAGNOSIS — G89.4 CHRONIC PAIN SYNDROME: ICD-10-CM

## 2018-08-20 DIAGNOSIS — G89.29 CHRONIC NECK PAIN: ICD-10-CM

## 2018-08-20 DIAGNOSIS — M54.16 LUMBAR RADICULOPATHY: Primary | ICD-10-CM

## 2018-08-20 DIAGNOSIS — M54.2 CHRONIC NECK PAIN: ICD-10-CM

## 2018-08-20 PROCEDURE — 99244 OFF/OP CNSLTJ NEW/EST MOD 40: CPT | Performed by: NURSE PRACTITIONER

## 2018-08-20 ASSESSMENT — PAIN SCALES - GENERAL: PAINLEVEL: SEVERE PAIN (6)

## 2018-08-20 NOTE — PATIENT INSTRUCTIONS
Make a follow up appointment with Dr. Cui as soon as able to discuss recommendations provided today. Consider inpatient detox as a resource. Call our clinic if interested in participating in our multidisciplinary program.     To check the status of detox bed availability at [facility] call:  [phone] (call at 0800 prior to going to the ER)    [facility] call:  [phone] [address] call:  [phone]    [place], [place]  Call: francesca [phone]     What to expect from pain PT...      Focuses on chronic pain (greater than 6 months)    Focuses on managing chronic pain    Focuses on respect for the body, healing, injury prevention, and recovery    Internal focus    Mind-body consciousness    Calming, focusing, relaxing    Often slow and methodical     Builds coordination, balance, precision of movement    Broad focus on sensation and awareness of body space    Meditative, goal-less practice    What to expect from pain psychology...    Focus on chronic pain management    Review of various coping skills    Review of relationship between pain, anxiety, depression, insomnia, and stress    Discussion of relaxation therapy, may include hypnosis

## 2018-08-20 NOTE — MR AVS SNAPSHOT
After Visit Summary   8/20/2018    Jose Luis Christoph Gonzáles    MRN: [id number]           Patient Information     Date Of Birth          1970        Visit Information        Provider Department      8/20/2018 3:30 PM Inez Nagel APRN CNP [facility]        Today's Diagnoses     Lumbar radiculopathy    -  1      Care Instructions    Make a follow up appointment with Dr. Cui as soon as able to discuss recommendations provided today. Consider inpatient detox as a resource. Call our clinic if interested in participating in our multidisciplinary program.     To check the status of detox bed availability at [facility] call:  [phone] (call at 0800 prior to going to the ER)    [facility] call:  [phone]    [address] [place] [address] call:  [phone]    [place], [place]  Call: francesca [phone]     What to expect from pain PT...      Focuses on chronic pain (greater than 6 months)    Focuses on managing chronic pain    Focuses on respect for the body, healing, injury prevention, and recovery    Internal focus    Mind-body consciousness    Calming, focusing, relaxing    Often slow and methodical     Builds coordination, balance, precision of movement    Broad focus on sensation and awareness of body space    Meditative, goal-less practice    What to expect from pain psychology...    Focus on chronic pain management    Review of various coping skills    Review of relationship between pain, anxiety, depression, insomnia, and stress    Discussion of relaxation therapy, may include hypnosis            Follow-ups after your visit        Who to contact     If you have questions or need follow up information about today's clinic visit or your schedule please contact [facility] directly at [phone].  Normal or non-critical lab and imaging results will be communicated to you by MyChart, letter or phone within 4 business days after the clinic has  received the results. If you do not hear from us within 7 days, please contact the clinic through [employer] or phone. If you have a critical or abnormal lab result, we will notify you by phone as soon as possible.  Submit refill requests through [employer] or call your pharmacy and they will forward the refill request to us. Please allow 3 business days for your refill to be completed.          Additional Information About Your Visit        [employer] Information     [employer] gives you secure access to your electronic health record. If you see a primary care provider, you can also send messages to your care team and make appointments. If you have questions, please call your primary care clinic.  If you do not have a primary care provider, please call [phone] and they will assist you.        Care EveryWhere ID     This is your Care EveryWhere ID. This could be used by other organizations to access your [place] medical records  [id number]        Your Vitals Were     Pulse Pulse Oximetry                72 96%           Blood Pressure from Last 3 Encounters:   08/20/18 116/80   07/31/18 (!) 142/92   02/26/18 (!) 140/98    Weight from Last 3 Encounters:   07/31/18 75.7 kg (166 lb 14.4 oz)   02/26/18 76.2 kg (168 lb 1.6 oz)   11/14/17 79.9 kg (176 lb 3.2 oz)              Today, you had the following     No orders found for display       Primary Care Provider Office Phone # Fax #    Tonie Zach Cui [phone] [address] [facility] [address]        Equal Access to Services     [facility] : Hadii aad ku hadasho Soomaali, waaxda luqadaha, qaybta kaalmada adeegyada, jose angel kilgore . So [facility] [phone].    ATENCIÓN: Si habla español, tiene a ceron disposición servicios gratuitos de asistencia lingüística. Llame al [phone].    We comply with applicable federal civil rights laws and [place] laws. We do not discriminate on the basis of race, color,  national origin, age, disability, sex, sexual orientation, or gender identity.            Thank you!     Thank you for choosing [facility]  for your care. Our goal is always to provide you with excellent care. Hearing back from our patients is one way we can continue to improve our services. Please take a few minutes to complete the written survey that you may receive in the mail after your visit with us. Thank you!             Your Updated Medication List - Protect others around you: Learn how to safely use, store and throw away your medicines at www.disposemymeds.org.          This list is accurate as of 8/20/18  4:16 PM.  Always use your most recent med list.                   Brand Name Dispense Instructions for use Diagnosis    adapalene 0.1 % gel    DIFFERIN    45 g    Apply topically At Bedtime    Acne vulgaris       clonazePAM 1 MG tablet    klonoPIN    180 tablet    TAKE 1 TO 2 TABLETS BY MOUTH THREE TIMES DAILY AS NEEDED FOR ANXIETY    Anxiety       cloNIDine 0.1 MG tablet    CATAPRES    60 tablet    Take 1 tablet (0.1 mg) by mouth 2 times daily    Chronic pain syndrome, Hypertension goal BP (blood pressure) < 140/90       diazepam 10 MG tablet    VALIUM    60 tablet    TAKE 1 TABLET BY MOUTH TWICE DAILY AS NEEDED FOR SLEEP    Chronic pain syndrome, Neck pain       finasteride 5 MG tablet    PROSCAR    30 tablet    Take 1 tablet (5 mg) by mouth daily    Male pattern baldness       naloxone nasal spray    NARCAN    0.2 mL    Spray 1 spray (4 mg) into one nostril alternating nostrils as needed for opioid reversal every 2-3 minutes until assistance arrives    Chronic pain syndrome, Neck pain       omeprazole 20 MG tablet    priLOSEC OTC    90 tablet    Take 1 tablet (20 mg) by mouth daily    Gastroesophageal reflux disease without esophagitis       oxyCODONE IR 30 MG tablet    ROXICODONE    75 tablet    1 tab every 4 hours as needed up to 5 per day.    Chronic pain syndrome, Neck pain        oxyCODONE-acetaminophen 5-325 MG per tablet    PERCOCET    30 tablet    Take 1 tablet by mouth 2 times daily    Chronic pain syndrome, Neck pain       polyethylene glycol powder    MIRALAX/GLYCOLAX    527 g    STIR 17 GM OF POWDER (SEE MINGO INSIDE CAP) IN 8-OZ OF LIQUID UNTIL COMPLETELY DISSOLVED. DRINK THE SOLUTION DAILY OR AS DIRECTED.    Chronic constipation       tretinoin 0.1 % cream    RETIN-A    45 g    Apply topically At Bedtime    Acne vulgaris

## 2018-08-28 NOTE — TELEPHONE ENCOUNTER
..Reason for Call:    calling back     Detailed comments: call to discuss;  If the Oxycodone IR 30 milligram can be refilled one more time until something else is figured out;     15 days on Friday/due date; Ok for Mom to  - Evette Gonzáles    *message below not relayed    Phone Number Patient can be reached at: Home number on file [phone] (home)    Best Time: anytime    Can we leave a detailed message on this number? YES    Call taken on 8/28/2018 at 10:41 AM by Clara Krueger

## 2018-08-29 RX ORDER — OXYCODONE HYDROCHLORIDE 30 MG/1
TABLET ORAL
Qty: 75 TABLET | Refills: 0 | Status: SHIPPED | OUTPATIENT
Start: 2018-08-31 | End: 2018-09-05

## 2018-08-29 RX ORDER — OXYCODONE AND ACETAMINOPHEN 5; 325 MG/1; MG/1
1 TABLET ORAL 2 TIMES DAILY
Qty: 30 TABLET | Refills: 0 | Status: SHIPPED | OUTPATIENT
Start: 2018-08-31 | End: 2018-09-05

## 2018-08-29 NOTE — TELEPHONE ENCOUNTER
Reason for Call:  Other prescription    Detailed comments: 1:02 Pm called again waiting for med ready callback.    Phone Number Patient can be reached at: Cell number on file:    Telephone Information:   Mobile [phone]     Best Time: any     Can we leave a detailed message on this number? YES    Call taken on 8/29/2018 at 1:03 PM by Phoebe Corley

## 2018-09-05 ENCOUNTER — OFFICE VISIT (OUTPATIENT)
Dept: FAMILY MEDICINE | Facility: CLINIC | Age: 48
End: 2018-09-05
Payer: COMMERCIAL

## 2018-09-05 VITALS
HEART RATE: 81 BPM | BODY MASS INDEX: 26.83 KG/M2 | OXYGEN SATURATION: 96 % | TEMPERATURE: 97.8 F | WEIGHT: 187 LBS | SYSTOLIC BLOOD PRESSURE: 165 MMHG | DIASTOLIC BLOOD PRESSURE: 105 MMHG

## 2018-09-05 DIAGNOSIS — F41.9 ANXIETY: ICD-10-CM

## 2018-09-05 DIAGNOSIS — R11.0 NAUSEA: ICD-10-CM

## 2018-09-05 DIAGNOSIS — G89.4 CHRONIC PAIN SYNDROME: Primary | ICD-10-CM

## 2018-09-05 DIAGNOSIS — M54.2 NECK PAIN: ICD-10-CM

## 2018-09-05 PROCEDURE — 99214 OFFICE O/P EST MOD 30 MIN: CPT | Performed by: FAMILY MEDICINE

## 2018-09-05 RX ORDER — OXYCODONE HYDROCHLORIDE 30 MG/1
TABLET ORAL
Qty: 75 TABLET | Refills: 0 | Status: SHIPPED | OUTPATIENT
Start: 2018-09-14 | End: 2018-09-25

## 2018-09-05 RX ORDER — DIAZEPAM 10 MG
10 TABLET ORAL
Qty: 14 TABLET | Refills: 0 | Status: SHIPPED | OUTPATIENT
Start: 2018-09-05 | End: 2018-09-25

## 2018-09-05 RX ORDER — ONDANSETRON 4 MG/1
4-8 TABLET, FILM COATED ORAL 3 TIMES DAILY PRN
Qty: 30 TABLET | Refills: 0 | Status: SHIPPED | OUTPATIENT
Start: 2018-09-05 | End: 2018-11-24

## 2018-09-05 RX ORDER — OXYCODONE AND ACETAMINOPHEN 5; 325 MG/1; MG/1
1 TABLET ORAL 2 TIMES DAILY PRN
Qty: 10 TABLET | Refills: 0 | Status: SHIPPED | OUTPATIENT
Start: 2018-09-05 | End: 2018-09-25

## 2018-09-05 RX ORDER — CLONAZEPAM 1 MG/1
TABLET ORAL
Qty: 180 TABLET | Refills: 0 | Status: SHIPPED | OUTPATIENT
Start: 2018-09-05 | End: 2018-10-29

## 2018-09-05 ASSESSMENT — PAIN SCALES - GENERAL: PAINLEVEL: SEVERE PAIN (7)

## 2018-09-05 NOTE — PROGRESS NOTES
SUBJECTIVE:   Jose Luis Gonzáles is a 48 year old male who presents to clinic today for the following health issues:      Chronic Pain Follow-Up       Type / Location of Pain: Neck  Analgesia/pain control:       Recent changes:  worse      Overall control: Inadequate pain control  Activity level/function:      Daily activities:  Unable to perform most daily activities - chores, hobbies, social activities, driving    Work:  Unable to work  Adverse effects:  No  Adherance    Taking medication as directed?  Yes    Participating in other treatments: yes  Risk Factors:    Sleep:  Poor    Mood/anxiety:  slightly worsened    Recent family or social stressors:  some    Other aggravating factors: riding in cars  PHQ-9 SCORE 9/26/2016 2/28/2017 11/14/2017   Total Score - - -   Total Score 8 13 8     ODALYS-7 SCORE 9/26/2016 2/28/2017 11/14/2017   Total Score - - -   Total Score 12 20 10     Encounter-Level CSA - 12/12/2016:          Controlled Substance Agreement - Scan on 12/13/2016 10:02 AM : CONTROLLED SUBSTANCE AGREEMENT 12/12/16 (below)                Amount of exercise or physical activity: 1 day/week for an average of 15-30 minutes    Problems taking medications regularly: No    Medication side effects: none    Diet: regular (no restrictions)    SUBJECTIVE:  Here today in follow-up of chronic pain.  We have been tapering back every 2 weeks on his dosage with the goal of getting him less than 90 MED, if not off medication altogether.  Reviewed recent initial visit with pain management and they echoed my concerns given his history of addiction as well.  Proposed plan was a multidisciplinary approach including continuance of taper, perhaps even faster than I had proposed.  Also recommended getting off the benzodiazepines as well with an initial cessation of the diazepam.  Patient is experiencing a lot of dyspepsia and nausea.  Sometimes he feels bloated and sometimes has loose stools.  We discussed the role that his  change in medications can play in that.    Review of systems otherwise negative.  Past medical, family, and social history reviewed and updated in chart.    OBJECTIVE:  BP (!) 165/105  Pulse 81  Temp 97.8  F (36.6  C) (Oral)  Wt 84.8 kg (187 lb)  SpO2 96%  BMI 26.83 kg/m2  Alert and pleasant but obviously uncomfortable.  Some belching noted  S1 and S2 normal, no murmurs, clicks, gallops or rubs. Regular rate and rhythm. Chest is clear; no wheezes or rales. No edema or JVD.  The abdomen is soft without tenderness, guarding, mass, rebound or organomegaly. Bowel sounds are normal. No CVA tenderness or inguinal adenopathy noted.   Past labs reviewed with the patient.     ASSESSMENT / PLAN:  (G89.4) Chronic pain syndrome  (primary encounter diagnosis)  Comment: Discussed with the patient that we will continue with the taper.  I will give him a few more Endocet as he gets off of this completely and will be maintained for the next couple of weeks just on his baseline oxycodone dosage.  Will continue with the taper in 2 more weeks.  Has been taking 2 Valium at bedtime in addition to his underlying clonazepam.  We will cut this down to 1 at bedtime for a couple of weeks and then stop altogether.  Again discussed resources for addiction medicine, rehabilitation, etc.  Plan: oxyCODONE IR (ROXICODONE) 30 MG tablet,         oxyCODONE-acetaminophen (PERCOCET) 5-325 MG per        tablet, diazepam (VALIUM) 10 MG tablet            (M54.2) Neck pain  Comment: As above  Plan: oxyCODONE IR (ROXICODONE) 30 MG tablet,         oxyCODONE-acetaminophen (PERCOCET) 5-325 MG per        tablet, diazepam (VALIUM) 10 MG tablet            (F41.9) Anxiety  Comment: Taper plan as above  Plan: clonazePAM (KLONOPIN) 1 MG tablet            (R11.0) Nausea  Comment: Symptomatic relief if needed  Plan: ondansetron (ZOFRAN) 4 MG tablet        Discussed mechanism of action of the proposed medication, as well as potential effects, both good and bad.   Patient expressed understanding and agreed with treatment.     Follow up 2 weeks  JANET Cui MD    (Chart documentation completed in part with Dragon voice-recognition software.  Even though reviewed some grammatical, spelling, and word errors may remain.)

## 2018-09-05 NOTE — MR AVS SNAPSHOT
After Visit Summary   9/5/2018    Jose Luisdavion Gonzáles    MRN: [id number]           Patient Information     Date Of Birth          1970        Visit Information        Provider Department      9/5/2018 7:00 AM Tonie Cui MD [facility]        Today's Diagnoses     Chronic pain syndrome    -  1    Neck pain        Anxiety        Nausea           Follow-ups after your visit        Follow-up notes from your care team     Return in about 2 weeks (around 9/19/2018) for Contact me with progress.      Who to contact     If you have questions or need follow up information about today's clinic visit or your schedule please contact [facility] directly at [phone].  Normal or non-critical lab and imaging results will be communicated to you by MyChart, letter or phone within 4 business days after the clinic has received the results. If you do not hear from us within 7 days, please contact the clinic through [employer] or phone. If you have a critical or abnormal lab result, we will notify you by phone as soon as possible.  Submit refill requests through [employer] or call your pharmacy and they will forward the refill request to us. Please allow 3 business days for your refill to be completed.          Additional Information About Your Visit        MyChart Information     [employer] gives you secure access to your electronic health record. If you see a primary care provider, you can also send messages to your care team and make appointments. If you have questions, please call your primary care clinic.  If you do not have a primary care provider, please call [phone] and they will assist you.        Care EveryWhere ID     This is your Care EveryWhere ID. This could be used by other organizations to access your [place] medical records  [id number]        Your Vitals Were     Pulse Temperature Pulse Oximetry BMI (Body Mass Index)          81 97.8  F (36.6  C) (Oral)  96% 26.83 kg/m2         Blood Pressure from Last 3 Encounters:   09/05/18 (!) 165/105   08/20/18 116/80   07/31/18 (!) 142/92    Weight from Last 3 Encounters:   09/05/18 84.8 kg (187 lb)   07/31/18 75.7 kg (166 lb 14.4 oz)   02/26/18 76.2 kg (168 lb 1.6 oz)              Today, you had the following     No orders found for display         Today's Medication Changes          These changes are accurate as of 9/5/18  7:45 AM.  If you have any questions, ask your nurse or doctor.               Start taking these medicines.        Dose/Directions    ondansetron 4 MG tablet   Commonly known as:  ZOFRAN   Used for:  Nausea   Started by:  Tonie Cui MD        Dose:  4-8 mg   Take 1-2 tablets (4-8 mg) by mouth 3 times daily as needed for nausea   Quantity:  30 tablet   Refills:  0         These medicines have changed or have updated prescriptions.        Dose/Directions    diazepam 10 MG tablet   Commonly known as:  VALIUM   This may have changed:    - how much to take  - how to take this  - when to take this  - reasons to take this  - additional instructions   Used for:  Chronic pain syndrome, Neck pain   Changed by:  Tonie Cui MD        Dose:  10 mg   Take 1 tablet (10 mg) by mouth nightly as needed For 2 weeks then stop   Quantity:  14 tablet   Refills:  0       oxyCODONE IR 30 MG tablet   Commonly known as:  ROXICODONE   This may have changed:  These instructions start on 9/14/2018. If you are unsure what to do until then, ask your doctor or other care provider.   Used for:  Chronic pain syndrome, Neck pain   Changed by:  Tonie Cui MD        Start taking on:  9/14/2018   1 tab every 4 hours as needed up to 5 per day.   Quantity:  75 tablet   Refills:  0       oxyCODONE-acetaminophen 5-325 MG per tablet   Commonly known as:  PERCOCET   This may have changed:    - when to take this  - reasons to take this   Used for:  Chronic pain syndrome, Neck pain   Changed by:  Tonie Cui  MD Zach        Dose:  1 tablet   Take 1 tablet by mouth 2 times daily as needed for pain   Quantity:  10 tablet   Refills:  0            Where to get your medicines      Some of these will need a paper prescription and others can be bought over the counter.  Ask your nurse if you have questions.     Bring a paper prescription for each of these medications     clonazePAM 1 MG tablet    diazepam 10 MG tablet    ondansetron 4 MG tablet    oxyCODONE IR 30 MG tablet    oxyCODONE-acetaminophen 5-325 MG per tablet               Information about OPIOIDS     PRESCRIPTION OPIOIDS: WHAT YOU NEED TO KNOW   We gave you an opioid (narcotic) pain medicine. It is important to manage your pain, but opioids are not always the best choice. You should first try all the other options your care team gave you. Take this medicine for as short a time (and as few doses) as possible.    Some activities can increase your pain, such as bandage changes or therapy sessions. It may help to take your pain medicine 30 to 60 minutes before these activities. Reduce your stress by getting enough sleep, working on hobbies you enjoy and practicing relaxation or meditation. Talk to your care team about ways to manage your pain beyond prescription opioids.    These medicines have risks:    DO NOT drive when on new or higher doses of pain medicine. These medicines can affect your alertness and reaction times, and you could be arrested for driving under the influence (DUI). If you need to use opioids long-term, talk to your care team about driving.    DO NOT operate heavy machinery    DO NOT do any other dangerous activities while taking these medicines.    DO NOT drink any alcohol while taking these medicines.     If the opioid prescribed includes acetaminophen, DO NOT take with any other medicines that contain acetaminophen. Read all labels carefully. Look for the word  acetaminophen  or  Tylenol.  Ask your pharmacist if you have questions or are  unsure.    You can get addicted to pain medicines, especially if you have a history of addiction (chemical, alcohol or substance dependence). Talk to your care team about ways to reduce this risk.    All opioids tend to cause constipation. Drink plenty of water and eat foods that have a lot of fiber, such as fruits, vegetables, prune juice, apple juice and high-fiber cereal. Take a laxative (Miralax, milk of magnesia, Colace, Senna) if you don t move your bowels at least every other day. Other side effects include upset stomach, sleepiness, dizziness, throwing up, tolerance (needing more of the medicine to have the same effect), physical dependence and slowed breathing.    Store your pills in a secure place, locked if possible. We will not replace any lost or stolen medicine. If you don t finish your medicine, please throw away (dispose) as directed by your pharmacist. The [place] [employer] has more information about safe disposal: https://www.pca.[email or web address].[email or web address]/[email or web address]-unwanted-medications         Primary Care Provider Office Phone # Fax #    Tonie Zach Cui [phone] [address] [facility] [address]        Equal Access to Services     GIANCARLO LIU : Rufino luu Socalli, waaxda vani, qaybta kaalmada elder, jose angel yarbrough. So [facility] [phone].    ATENCIÓN: Si habla español, tiene a ceron disposición servicios gratuitos de asistencia lingüística. Llame al [phone].    We comply with applicable federal civil rights laws and [place] laws. We do not discriminate on the basis of race, color, national origin, age, disability, sex, sexual orientation, or gender identity.            Thank you!     Thank you for choosing [facility]  for your care. Our goal is always to provide you with excellent care. Hearing back from our patients is one way we can continue to improve our services. Please  take a few minutes to complete the written survey that you may receive in the mail after your visit with us. Thank you!             Your Updated Medication List - Protect others around you: Learn how to safely use, store and throw away your medicines at www.disposemymeds.org.          This list is accurate as of 9/5/18  7:45 AM.  Always use your most recent med list.                   Brand Name Dispense Instructions for use Diagnosis    adapalene 0.1 % gel    DIFFERIN    45 g    Apply topically At Bedtime    Acne vulgaris       clonazePAM 1 MG tablet    klonoPIN    180 tablet    TAKE 1 TO 2 TABLETS BY MOUTH THREE TIMES DAILY AS NEEDED FOR ANXIETY    Anxiety       cloNIDine 0.1 MG tablet    CATAPRES    60 tablet    Take 1 tablet (0.1 mg) by mouth 2 times daily    Chronic pain syndrome, Hypertension goal BP (blood pressure) < 140/90       diazepam 10 MG tablet    VALIUM    14 tablet    Take 1 tablet (10 mg) by mouth nightly as needed For 2 weeks then stop    Chronic pain syndrome, Neck pain       finasteride 5 MG tablet    PROSCAR    30 tablet    Take 1 tablet (5 mg) by mouth daily    Male pattern baldness       naloxone nasal spray    NARCAN    0.2 mL    Spray 1 spray (4 mg) into one nostril alternating nostrils as needed for opioid reversal every 2-3 minutes until assistance arrives    Chronic pain syndrome, Neck pain       omeprazole 20 MG tablet    priLOSEC OTC    90 tablet    Take 1 tablet (20 mg) by mouth daily    Gastroesophageal reflux disease without esophagitis       ondansetron 4 MG tablet    ZOFRAN    30 tablet    Take 1-2 tablets (4-8 mg) by mouth 3 times daily as needed for nausea    Nausea       oxyCODONE IR 30 MG tablet   Start taking on:  9/14/2018    ROXICODONE    75 tablet    1 tab every 4 hours as needed up to 5 per day.    Chronic pain syndrome, Neck pain       oxyCODONE-acetaminophen 5-325 MG per tablet    PERCOCET    10 tablet    Take 1 tablet by mouth 2 times daily as needed for pain     Chronic pain syndrome, Neck pain       polyethylene glycol powder    MIRALAX/GLYCOLAX    527 g    STIR 17 GM OF POWDER (SEE MINGO INSIDE CAP) IN 8-OZ OF LIQUID UNTIL COMPLETELY DISSOLVED. DRINK THE SOLUTION DAILY OR AS DIRECTED.    Chronic constipation       tretinoin 0.1 % cream    RETIN-A    45 g    Apply topically At Bedtime    Acne vulgaris

## 2018-09-10 ENCOUNTER — TELEPHONE (OUTPATIENT)
Dept: FAMILY MEDICINE | Facility: CLINIC | Age: 48
End: 2018-09-10

## 2018-09-10 NOTE — TELEPHONE ENCOUNTER
Reason for Call:  Other     Detailed comments: pharmacy states patient is requesting a day early refill for oxyCODONE-acetaminophen (PERCOCET) 5-325 MG per tablet. Pharmacy would like an ok by Dr. Cui. Please call to advise. Thanks.     Phone Number Pharmacy can be reached at: [phone]    Best Time: anytime     Can we leave a detailed message on this number? Not Applicable    Call taken on 9/10/2018 at 9:31 AM by Harvey Gordillo

## 2018-09-25 ENCOUNTER — VIRTUAL VISIT (OUTPATIENT)
Dept: FAMILY MEDICINE | Facility: CLINIC | Age: 48
End: 2018-09-25
Payer: COMMERCIAL

## 2018-09-25 DIAGNOSIS — M54.2 NECK PAIN: ICD-10-CM

## 2018-09-25 DIAGNOSIS — G89.4 CHRONIC PAIN SYNDROME: ICD-10-CM

## 2018-09-25 PROCEDURE — 99441 ZZC PHYSICIAN TELEPHONE EVALUATION 5-10 MIN: CPT | Performed by: FAMILY MEDICINE

## 2018-09-25 RX ORDER — OXYCODONE HYDROCHLORIDE 30 MG/1
TABLET ORAL
Qty: 75 TABLET | Refills: 0 | Status: SHIPPED | OUTPATIENT
Start: 2018-09-27 | End: 2018-10-08

## 2018-09-25 NOTE — MR AVS SNAPSHOT
After Visit Summary   9/25/2018    Jose Luis Christoph Gonzáles    MRN: [id number]           Patient Information     Date Of Birth          1970        Visit Information        Provider Department      9/25/2018 1:40 PM Tonie Cui MD [facility]        Today's Diagnoses     Chronic pain syndrome        Neck pain           Follow-ups after your visit        Follow-up notes from your care team     Return in about 2 weeks (around 10/9/2018) for Follow up on pain.      Who to contact     If you have questions or need follow up information about today's clinic visit or your schedule please contact [facility] directly at [phone].  Normal or non-critical lab and imaging results will be communicated to you by MyChart, letter or phone within 4 business days after the clinic has received the results. If you do not hear from us within 7 days, please contact the clinic through AutomateIthart or phone. If you have a critical or abnormal lab result, we will notify you by phone as soon as possible.  Submit refill requests through Herotainment or call your pharmacy and they will forward the refill request to us. Please allow 3 business days for your refill to be completed.          Additional Information About Your Visit        MyChart Information     Her[employer]ment gives you secure access to your electronic health record. If you see a primary care provider, you can also send messages to your care team and make appointments. If you have questions, please call your primary care clinic.  If you do not have a primary care provider, please call [phone] and they will assist you.        Care EveryWhere ID     This is your Care EveryWhere ID. This could be used by other organizations to access your [place] medical records  [id number]         Blood Pressure from Last 3 Encounters:   09/05/18 (!) 165/105   08/20/18 116/80   07/31/18 (!) 142/92    Weight from Last 3 Encounters:   09/05/18 84.8  kg (187 lb)   07/31/18 75.7 kg (166 lb 14.4 oz)   02/26/18 76.2 kg (168 lb 1.6 oz)              Today, you had the following     No orders found for display         Today's Medication Changes          These changes are accurate as of 9/25/18  3:22 PM.  If you have any questions, ask your nurse or doctor.               These medicines have changed or have updated prescriptions.        Dose/Directions    oxyCODONE IR 30 MG tablet   Commonly known as:  ROXICODONE   This may have changed:  These instructions start on 9/27/2018. If you are unsure what to do until then, ask your doctor or other care provider.   Used for:  Chronic pain syndrome, Neck pain        Start taking on:  9/27/2018   1 tab every 4 hours as needed up to 5 per day.   Quantity:  75 tablet   Refills:  0         Stop taking these medicines if you haven't already. Please contact your care team if you have questions.     diazepam 10 MG tablet   Commonly known as:  VALIUM           oxyCODONE-acetaminophen 5-325 MG per tablet   Commonly known as:  PERCOCET                Where to get your medicines      Some of these will need a paper prescription and others can be bought over the counter.  Ask your nurse if you have questions.     Bring a paper prescription for each of these medications     oxyCODONE IR 30 MG tablet               Information about OPIOIDS     PRESCRIPTION OPIOIDS: WHAT YOU NEED TO KNOW   We gave you an opioid (narcotic) pain medicine. It is important to manage your pain, but opioids are not always the best choice. You should first try all the other options your care team gave you. Take this medicine for as short a time (and as few doses) as possible.    Some activities can increase your pain, such as bandage changes or therapy sessions. It may help to take your pain medicine 30 to 60 minutes before these activities. Reduce your stress by getting enough sleep, working on hobbies you enjoy and practicing relaxation or meditation. Talk to  your care team about ways to manage your pain beyond prescription opioids.    These medicines have risks:    DO NOT drive when on new or higher doses of pain medicine. These medicines can affect your alertness and reaction times, and you could be arrested for driving under the influence (DUI). If you need to use opioids long-term, talk to your care team about driving.    DO NOT operate heavy machinery    DO NOT do any other dangerous activities while taking these medicines.    DO NOT drink any alcohol while taking these medicines.     If the opioid prescribed includes acetaminophen, DO NOT take with any other medicines that contain acetaminophen. Read all labels carefully. Look for the word  acetaminophen  or  Tylenol.  Ask your pharmacist if you have questions or are unsure.    You can get addicted to pain medicines, especially if you have a history of addiction (chemical, alcohol or substance dependence). Talk to your care team about ways to reduce this risk.    All opioids tend to cause constipation. Drink plenty of water and eat foods that have a lot of fiber, such as fruits, vegetables, prune juice, apple juice and high-fiber cereal. Take a laxative (Miralax, milk of magnesia, Colace, Senna) if you don t move your bowels at least every other day. Other side effects include upset stomach, sleepiness, dizziness, throwing up, tolerance (needing more of the medicine to have the same effect), physical dependence and slowed breathing.    Store your pills in a secure place, locked if possible. We will not replace any lost or stolen medicine. If you don t finish your medicine, please throw away (dispose) as directed by your pharmacist. The [place] Pollution Control Agency has more information about safe disposal: https://www.pca.[facility].mn.us/living-green/managing-unwanted-medications         Primary Care Provider Office Phone # Fax #    Tonie Cui [phone] [address]  [place] [facility] [address]        Equal Access to Services     [facility] ALEXA : Hadii bishop jaramillo alfredsherrill Sokarriali, waaxda luqadaha, qaybta kaalmaada friedman, jose angel yarbrough. So [facility] [phone].    ATENCIÓN: Si habla español, tiene a ceron disposición servicios gratuitos de asistencia lingüística. Manuelito al [phone].    We comply with applicable federal civil rights laws and [place] laws. We do not discriminate on the basis of race, color, national origin, age, disability, sex, sexual orientation, or gender identity.            Thank you!     Thank you for choosing [facility]  for your care. Our goal is always to provide you with excellent care. Hearing back from our patients is one way we can continue to improve our services. Please take a few minutes to complete the written survey that you may receive in the mail after your visit with us. Thank you!             Your Updated Medication List - Protect others around you: Learn how to safely use, store and throw away your medicines at www.disposemymeds.org.          This list is accurate as of 9/25/18  3:22 PM.  Always use your most recent med list.                   Brand Name Dispense Instructions for use Diagnosis    adapalene 0.1 % gel    DIFFERIN    45 g    Apply topically At Bedtime    Acne vulgaris       clonazePAM 1 MG tablet    klonoPIN    180 tablet    TAKE 1 TO 2 TABLETS BY MOUTH THREE TIMES DAILY AS NEEDED FOR ANXIETY    Anxiety       cloNIDine 0.1 MG tablet    CATAPRES    60 tablet    Take 1 tablet (0.1 mg) by mouth 2 times daily    Chronic pain syndrome, Hypertension goal BP (blood pressure) < 140/90       finasteride 5 MG tablet    PROSCAR    30 tablet    Take 1 tablet (5 mg) by mouth daily    Male pattern baldness       naloxone nasal spray    NARCAN    0.2 mL    Spray 1 spray (4 mg) into one nostril alternating nostrils as needed for opioid reversal every 2-3 minutes until assistance arrives    Chronic pain  syndrome, Neck pain       omeprazole 20 MG tablet    priLOSEC OTC    90 tablet    Take 1 tablet (20 mg) by mouth daily    Gastroesophageal reflux disease without esophagitis       ondansetron 4 MG tablet    ZOFRAN    30 tablet    Take 1-2 tablets (4-8 mg) by mouth 3 times daily as needed for nausea    Nausea       oxyCODONE IR 30 MG tablet   Start taking on:  9/27/2018    ROXICODONE    75 tablet    1 tab every 4 hours as needed up to 5 per day.    Chronic pain syndrome, Neck pain       polyethylene glycol powder    MIRALAX/GLYCOLAX    527 g    STIR 17 GM OF POWDER (SEE MINGO INSIDE CAP) IN 8-OZ OF LIQUID UNTIL COMPLETELY DISSOLVED. DRINK THE SOLUTION DAILY OR AS DIRECTED.    Chronic constipation       tretinoin 0.1 % cream    RETIN-A    45 g    Apply topically At Bedtime    Acne vulgaris

## 2018-09-25 NOTE — PROGRESS NOTES
oxyCODONE  rx placed at .  Pt notified.  Pt's mother, prince Alas to  rx.        Hyacinth URBANO)(PETER)

## 2018-09-25 NOTE — PROGRESS NOTES
"Jose Luis Gonzáles is a 48 year old male who is being evaluated via a telephone visit.      The patient has been notified of following:     \"This telephone visit will be conducted via a call between you and your physician/provider. We have found that certain health care needs can be provided without the need for a physical exam.  This service lets us provide the care you need with a short phone conversation.  If a prescription is necessary we can send it directly to your pharmacy.  If lab work is needed we can place an order for that and you can then stop by our lab to have the test done at a later time.    We will bill your insurance company for this service.  Please check with your medical insurance if this type of visit is covered. You may be responsible for the cost of this type of visit if insurance coverage is denied.  The typical cost is $30 (10min), $59 (11-20min) and $85 (21-30min).  Most often these visits are shorter than 10 minutes.    If during the course of the call the physician/provider feels a telephone visit is not appropriate, you will not be charged for this service.\"       Consent has been obtained for this service by care team member: yes.   See the scanned image in the medical record.    Jose Luis Gonzáles complains of  Medication Problem      I have reviewed and updated the patient's Past Medical History, Social History, Family History and Medication List.    ALLERGIES  Clonidine; Contrast dye; Ibuprofen; Keflex [cephalexin]; Seroquel [quetiapine]; and Valproic acid    Venus Marinelli MA on 9/25/2018 at 2:39 PM   (MA signature)    Additional provider notes:    I spoke with patient via phone regarding his chronic pain disorder.  We reviewed our recent visit as well as the recent pain management visit and recommendations.  Since I saw him last we had stopped the additional Percocet as well as the diazepam.  He is relying on clonazepam but he said not that much.  He informs me that he is not " due for refill at this time.  His current baseline is oxycodone 30 mg 5 times daily.  Our overall goal would be to get him off the medication altogether, with the initial goal to get down to 90 MED.  With the initial drop of the Percocet he did experience some withdrawal symptoms including nausea and bloating but eventually had copious bowel movements and is actually feeling better.  He did use some Zofran to help with nausea and that has improved as well.  We discussed his current situation and now that he is off of any adjunctive medication will maintain the baseline oxycodone dosage for 2 more weeks and then continue with the taper.    ASSESSMENT / PLAN:  (G89.4) Chronic pain syndrome  Comment: Discussion as above  Plan: oxyCODONE IR (ROXICODONE) 30 MG tablet            (M54.2) Neck pain  Comment:   Plan: oxyCODONE IR (ROXICODONE) 30 MG tablet               I have reviewed the note as documented above.  This accurately captures the substance of my conversation with the patient,  SJeremi Cui M.D.     Total time of call between patient and provider was 7 minutes

## 2018-10-03 ENCOUNTER — TELEPHONE (OUTPATIENT)
Dept: PALLIATIVE MEDICINE | Facility: CLINIC | Age: 48
End: 2018-10-03

## 2018-10-03 DIAGNOSIS — M54.16 LUMBAR RADICULOPATHY: Primary | ICD-10-CM

## 2018-10-03 NOTE — TELEPHONE ENCOUNTER
Per last OV:              1.  Physical Therapy:  YES   Would highly recommend pain PT.     Please place order, patient called to schedule appt.     Isabel NEWSOME-RN Care Coordinator  [facility]

## 2018-10-03 NOTE — TELEPHONE ENCOUNTER
Received call from patient who wanted to set up physical therapy. No order. Offered patient a few appointment times and he stated that he would call back to schedule after setting up a different appointment. Please place order for physical therapy.      Arcelia Ellison    [facility]

## 2018-10-08 DIAGNOSIS — G89.4 CHRONIC PAIN SYNDROME: ICD-10-CM

## 2018-10-08 DIAGNOSIS — M54.2 NECK PAIN: ICD-10-CM

## 2018-10-08 NOTE — TELEPHONE ENCOUNTER
Reason for Call:  Medication or medication refill:    Do you use a [facility]?  Name of the pharmacy and phone number for the current request:  Written prescription      Name of the medication requested: oxyCODONE IR (ROXICODONE) 30 MG tablet    Other request: patient is authorizing his mother, Evette Montgomery to  RX     Can we leave a detailed message on this number? YES    Phone number patient can be reached at: Home number on file [phone] (home)    Best Time: anytime     Call taken on 10/8/2018 at 10:36 AM by Harvey Gordillo

## 2018-10-08 NOTE — TELEPHONE ENCOUNTER
Requested Prescriptions   Pending Prescriptions Disp Refills     oxyCODONE IR (ROXICODONE) 30 MG tablet 75 tablet 0     Si tab every 4 hours as needed up to 5 per day.    There is no refill protocol information for this order        oxyCODONE IR (ROXICODONE) 30 MG tablet      Last Written Prescription Date:  18  Last Fill Quantity: 75,   # refills: 0  Last Office Visit: 18  Future Office visit:       Routing refill request to provider for review/approval because:  Drug not on the [facility], [facility] or [facility] refill protocol or controlled substance

## 2018-10-10 NOTE — TELEPHONE ENCOUNTER
Controlled Substance Refill Request for Oxycodone  Problem List Complete:  Yes  Chronic pain syndrome         Problem Detail      Noted:  8/19/2016      Priority:  Medium      Overview Addendum 9/5/2018  7:25 AM by Tonie Cui MD     Patient is followed by Tonie Cui MD for ongoing prescription of pain medication.  All refills should be approved by this provider, or covering partner.     Medication(s): endocet (SHREYAS) 10/325, oxycodone 30 mg. Taper plan underway - decrease 10 mg daily every 2 weeks  Maximum quantity per month:      Oxycodone 30 mg 5x / day = 225 MED  Percocet - off  Total = 225 MED  Goal < 90 MED - pain management involved as well     Narcan prescribed   Clinic visit frequency required: Q 1 months      Controlled substance agreement:      Encounter-Level CSA:      There are no encounter-level csa.       Pain Clinic evaluation in the past: No     DIRE Total Score(s):  No flowsheet data found.     Last [facility] website verification:  done on 9/5/18        checked in past 3 months?  Yes 9/5/18     Last Written Prescription Date:  9/27/18  Last Fill Quantity: 75,  # refills: 0   Last office visit: 9/25/2018 with prescribing provider:  9/25/18   Future Office Visit:      [place] reviewed:  not reviewed/not due - last done on 9/5/18    [place]:  [email or web address]      Lian Rodriguez RN, BSN

## 2018-10-11 ENCOUNTER — NURSE TRIAGE (OUTPATIENT)
Dept: NURSING | Facility: CLINIC | Age: 48
End: 2018-10-11

## 2018-10-11 NOTE — TELEPHONE ENCOUNTER
Reason for Call:  Other prescription    Detailed comments: Pt calling to follow up on medication request for it is due and would like for Dr. Cui to send in Rx to  as soon as possible.  He would like a call back to confirm Rx has been sent.    Phone Number Patient can be reached at: Home number on file [phone] (home)    Best Time: anytime    Can we leave a detailed message on this number? YES    Call taken on 10/11/2018 at 8:05 AM by Ashok Forde

## 2018-10-12 ENCOUNTER — VIRTUAL VISIT (OUTPATIENT)
Dept: FAMILY MEDICINE | Facility: CLINIC | Age: 48
End: 2018-10-12
Payer: COMMERCIAL

## 2018-10-12 DIAGNOSIS — G89.4 CHRONIC PAIN SYNDROME: Primary | ICD-10-CM

## 2018-10-12 PROCEDURE — 99213 OFFICE O/P EST LOW 20 MIN: CPT | Performed by: FAMILY MEDICINE

## 2018-10-12 RX ORDER — OXYCODONE HYDROCHLORIDE 20 MG/1
1-2 TABLET ORAL 4 TIMES DAILY PRN
Qty: 135 TABLET | Refills: 0 | Status: SHIPPED | OUTPATIENT
Start: 2018-10-12 | End: 2018-10-26

## 2018-10-12 NOTE — TELEPHONE ENCOUNTER
Prescription refilled.  We are changing to the 20 mg tablets as we continue to taper back.  This will allow more flexibility

## 2018-10-12 NOTE — MR AVS SNAPSHOT
After Visit Summary   10/12/2018    Jose Luis Christoph Gonzáles    MRN: [id number]           Patient Information     Date Of Birth          1970        Visit Information        Provider Department      10/12/2018 9:40 AM Tonie Cui MD [facility]        Today's Diagnoses     Chronic pain syndrome    -  1       Follow-ups after your visit        Follow-up notes from your care team     Return in about 2 weeks (around 10/26/2018) for Follow up on pain.      Your next 10 appointments already scheduled     Oct 15, 2018  9:30 AM CDT   New Visit with Julia Banda PT   [facility] ([facility])    [address]  [address]  [place] [address]   [phone]            Oct 23, 2018 12:20 PM CDT   [facility]   [facility]    [address] [address] [phone]           Bring a current list of meds and any records pertaining to this visit. For Physicals, please bring immunization records and any forms needing to be filled out. Please arrive 10 minutes early to complete paperwork.              Who to contact     If you have questions or need follow up information about today's clinic visit or your schedule please contact [facility] directly at [phone].  Normal or non-critical lab and imaging results will be communicated to you by MyChart, letter or phone within 4 business days after the clinic has received the results. If you do not hear from us within 7 days, please contact the clinic through MyChart or phone. If you have a critical or abnormal lab result, we will notify you by phone as soon as possible.  Submit refill requests through [employer] or call your pharmacy and they will forward the refill request to us. Please allow 3 business days for your refill to be completed.          Additional Information About  Your Visit        EXPO [employer]hart Information     [employer] gives you secure access to your electronic health record. If you see a primary care provider, you can also send messages to your care team and make appointments. If you have questions, please call your primary care clinic.  If you do not have a primary care provider, please call [phone] and they will assist you.        Care EveryWhere ID     This is your Care EveryWhere ID. This could be used by other organizations to access your [place] medical records  [id number]         Blood Pressure from Last 3 Encounters:   09/05/18 (!) 165/105   08/20/18 116/80   07/31/18 (!) 142/92    Weight from Last 3 Encounters:   09/05/18 84.8 kg (187 lb)   07/31/18 75.7 kg (166 lb 14.4 oz)   02/26/18 76.2 kg (168 lb 1.6 oz)              Today, you had the following     No orders found for display       Primary Care Provider Office Phone # Fax #    Tonie Zach Cui [phone] [address] [address]        Equal Access to Services     [facility]: Hadii aad ku hadasho Soomaali, waaxda luqadaha, qaybta kaalmada adeegyada, jose angel kilgore . So [facility] [phone].    ATENCIÓN: Si habla español, tiene a ceron disposición servicios gratuitos de asistencia lingüística. Ll[facility] [phone].    We comply with applicable federal civil rights laws and [place] laws. We do not discriminate on the basis of race, color, national origin, age, disability, sex, sexual orientation, or gender identity.            Thank you!     Thank you for choosing [facility]  for your care. Our goal is always to provide you with excellent care. Hearing back from our patients is one way we can continue to improve our services. Please take a few minutes to complete the written survey that you may receive in the mail after your visit with us. Thank you!             Your Updated Medication List - Protect others around you:  Learn how to safely use, store and throw away your medicines at www.disposemymeds.org.          This list is accurate as of 10/12/18 10:43 AM.  Always use your most recent med list.                   Brand Name Dispense Instructions for use Diagnosis    adapalene 0.1 % gel    DIFFERIN    45 g    Apply topically At Bedtime    Acne vulgaris       clonazePAM 1 MG tablet    klonoPIN    180 tablet    TAKE 1 TO 2 TABLETS BY MOUTH THREE TIMES DAILY AS NEEDED FOR ANXIETY    Anxiety       cloNIDine 0.1 MG tablet    CATAPRES    60 tablet    Take 1 tablet (0.1 mg) by mouth 2 times daily    Chronic pain syndrome, Hypertension goal BP (blood pressure) < 140/90       finasteride 5 MG tablet    PROSCAR    30 tablet    Take 1 tablet (5 mg) by mouth daily    Male pattern baldness       naloxone nasal spray    NARCAN    0.2 mL    Spray 1 spray (4 mg) into one nostril alternating nostrils as needed for opioid reversal every 2-3 minutes until assistance arrives    Chronic pain syndrome, Neck pain       omeprazole 20 MG tablet    priLOSEC OTC    90 tablet    Take 1 tablet (20 mg) by mouth daily    Gastroesophageal reflux disease without esophagitis       ondansetron 4 MG tablet    ZOFRAN    30 tablet    Take 1-2 tablets (4-8 mg) by mouth 3 times daily as needed for nausea    Nausea       oxyCODONE HCl 20 MG Tabs immediate release tablet    ROXICODONE    135 tablet    Take 1-2 tablets by mouth 4 times daily as needed (pain) Maximum 7 tablets/day    Chronic pain syndrome, Neck pain       polyethylene glycol powder    MIRALAX/GLYCOLAX    527 g    STIR 17 GM OF POWDER (SEE MINGO INSIDE CAP) IN 8-OZ OF LIQUID UNTIL COMPLETELY DISSOLVED. DRINK THE SOLUTION DAILY OR AS DIRECTED.    Chronic constipation       tretinoin 0.1 % cream    RETIN-A    45 g    Apply topically At Bedtime    Acne vulgaris

## 2018-10-12 NOTE — PROGRESS NOTES
"Jose Luis Gonzáles is a 48 year old male who is being evaluated via a telephone visit.      The patient has been notified of following (by M.A) Venus Marinelli MA on 10/12/2018 at 9:42 AM     \"We have found that certain health care needs can be provided without the need for a physical exam.  This service lets us provide the care you need with a short phone conversation.  If a prescription is necessary we can send it directly to your pharmacy.  If lab work is needed we can place an order for that and you can then stop by our lab to have the test done at a later time.    This telephone visit will be conducted via 3 way call with the you (the patient) , the physician/provider, and a me all on the line at the same time.  This allows your physician/provider to have the phone conversation with you while I will be taking notes for your medical record.  We will have full access to your [facility] medical record during this entire phone call.    Since this is like an office visit,  will bill your insurance company for this service.  Please check with your medical insurance if this type of telephone/virtual is covered . You may be responsible for the cost of this service if insurance coverage is denied.  The typical cost is $30 (10min), $59(11-20min) and $85 (21-30min)     If during the course of the call the physician/provider feels a telephone visit is not appropriate, you will not be charged for this service\"    Consent has been obtained for this service by care team member: yes.  See the scanned image in the medical record.    Spoke with patient via phone regarding chronic pain syndrome.  We had maintained his current dosage for the past couple of weeks but now it is time to continue the taper as suggested by his pain clinic evaluation.  Patient says he is struggling a bit with withdrawal symptoms and I did discuss this with him including ways to ameliorate some of the symptoms.  Again discussed the possibility of inpatient " rehabilitation if needed.  But the plan is to continue to back down on this and we will change the total dosage from 150 mg oxycodone daily to 140 mg oxycodone daily.    Total time of call between patient and provider was 7 minutes     JANET Cui M.D.  (MD signature)  ===================================================    I have reviewed the note as documented above.  This accurately captures the substance of my conversation with the patient,    Additional provider notes:    ASSESSMENT / PLAN:  (G89.4) Chronic pain syndrome  (primary encounter diagnosis)  Comment: Plans per his medication list and problem list  Plan:     JANET Cui M.D.

## 2018-10-12 NOTE — TELEPHONE ENCOUNTER
Patient calling requesting prescription for oxyCODONE IR (ROXICODONE) 30 MG tablet. Would like to be called to confirm prescription ready for . RN sent message to clinic to follow up with patient.     Kevin Beckwith RN  [facility]       Reason for Disposition    Caller has NON-URGENT medication question about med that PCP prescribed and triager unable to answer question    Protocols used: MEDICATION QUESTION CALL-ADULT-

## 2018-10-24 ENCOUNTER — TELEPHONE (OUTPATIENT)
Dept: FAMILY MEDICINE | Facility: CLINIC | Age: 48
End: 2018-10-24

## 2018-10-24 DIAGNOSIS — G89.4 CHRONIC PAIN SYNDROME: ICD-10-CM

## 2018-10-24 DIAGNOSIS — M54.2 NECK PAIN: ICD-10-CM

## 2018-10-24 NOTE — TELEPHONE ENCOUNTER
Pt calling in asking for Samaria Nagel to call him to see what her recommendations are her the medication he is currently taking      Rita GREEN    [facility]

## 2018-10-24 NOTE — TELEPHONE ENCOUNTER
Reason for call:  Other   Patient called regarding (reason for call): appointment  Additional comments: Patient calling back to check on status of his message. He would really like someone to call him back to see what he needs to do.    Phone number to reach patient:  Cell number on file:    Telephone Information:   Mobile [phone]       Best Time:  Any time    Can we leave a detailed message on this number?  YES

## 2018-10-24 NOTE — TELEPHONE ENCOUNTER
When we spoke on 10/12 we decreased his dosage to 7 x 20 mg tabs (140 mg total) per day.  He should not be out for a while yet.  I certainly did not authorize him to take more

## 2018-10-24 NOTE — TELEPHONE ENCOUNTER
Reason for Call:  Other prescription    Detailed comments: Will run out of meds on Sun none for Mon until appt needs call back.     Phone Number Patient can be reached at: Cell number on file:    Telephone Information:   Mobile [phone]     Best Time: any    Can we leave a detailed message on this number? YES    Call taken on 10/24/2018 at 12:24 PM by Phoebe Corley

## 2018-10-24 NOTE — TELEPHONE ENCOUNTER
Patient called today.    Patient has a current appointment on October 29, 2018 with Dr. Cui at the [facility].    Patient states that he currently has no transportation and will be out of the medication for the medication review appointment on October 28, 2018.    Patient is requesting a med for a TELEPHONE VISIT today; October 24, 2018 with Dr. Cui at the [facility].    Please contact patient.    Thank you.    Central Scheduling  Kylah HENDERSON

## 2018-10-25 ENCOUNTER — TELEPHONE (OUTPATIENT)
Dept: PALLIATIVE MEDICINE | Facility: CLINIC | Age: 48
End: 2018-10-25

## 2018-10-25 NOTE — TELEPHONE ENCOUNTER
Received call from patient who is requesting to speak to Samaria Nagel in regards to his medications. Patient mentioned not being able to get to the limits that were recommended. Phone #[phone]      Arcelia Ellison    [facility]

## 2018-10-25 NOTE — TELEPHONE ENCOUNTER
Pt states he never was told to cut back to 7 tablets per day.  Told pt according to records he had a 3 way call with himself, his mother and Dr. Ciu and was informed.  Pt said this never happened, Dr. Cui only talked to pts mother, and his mother never told him.    Informed pt it was written on the directions, pt states he didn't see it.    Wondering when he can get a refill?    Jenny BREEN, Patient Care

## 2018-10-26 RX ORDER — OXYCODONE HYDROCHLORIDE 20 MG/1
1-2 TABLET ORAL 4 TIMES DAILY PRN
Qty: 49 TABLET | Refills: 0 | Status: SHIPPED | OUTPATIENT
Start: 2018-10-26 | End: 2018-10-29

## 2018-10-26 NOTE — TELEPHONE ENCOUNTER
Called patient. He states that he had issues with obtaining his last script. He would like the detox info emailed to him.  Advised that I would need to scan email it to him so this information would come via an attachment.  He will call our scheduling desk and provide an email address shortly.     Cassandra NEWSOME, RN Care Coordinator  [facility]

## 2018-10-26 NOTE — TELEPHONE ENCOUNTER
Not sure what that is all about - I have never spoken with his mother.  And he and I discussed the need to con't the taper.  I have offered the outlet of pain management, and it looks like he is working to set this up.    Here's what I can do:  I will print new prescription dated today.  Max 7 tabs per day.  1 week only.  We can touch base next week and the taper will con't.

## 2018-10-26 NOTE — TELEPHONE ENCOUNTER
Reason for Call:  Other prescription    Detailed comments: Ok for mom to pickup Evette Ingram.    Phone Number Patient can be reached at: Cell number on file:    Telephone Information:   Mobile [phone]     Best Time: any    Can we leave a detailed message on this number? YES    Call taken on 10/26/2018 at 10:25 AM by Phoebe Corley

## 2018-10-29 ENCOUNTER — OFFICE VISIT (OUTPATIENT)
Dept: FAMILY MEDICINE | Facility: CLINIC | Age: 48
End: 2018-10-29
Payer: COMMERCIAL

## 2018-10-29 VITALS
HEART RATE: 106 BPM | HEIGHT: 70 IN | WEIGHT: 178 LBS | TEMPERATURE: 98.4 F | OXYGEN SATURATION: 95 % | SYSTOLIC BLOOD PRESSURE: 140 MMHG | DIASTOLIC BLOOD PRESSURE: 84 MMHG | BODY MASS INDEX: 25.48 KG/M2

## 2018-10-29 DIAGNOSIS — Z23 NEED FOR PROPHYLACTIC VACCINATION AND INOCULATION AGAINST INFLUENZA: ICD-10-CM

## 2018-10-29 DIAGNOSIS — F41.9 ANXIETY: ICD-10-CM

## 2018-10-29 DIAGNOSIS — M54.2 NECK PAIN: ICD-10-CM

## 2018-10-29 DIAGNOSIS — G89.4 CHRONIC PAIN SYNDROME: Primary | ICD-10-CM

## 2018-10-29 PROCEDURE — 99213 OFFICE O/P EST LOW 20 MIN: CPT | Mod: 25 | Performed by: FAMILY MEDICINE

## 2018-10-29 PROCEDURE — 90686 IIV4 VACC NO PRSV 0.5 ML IM: CPT | Performed by: FAMILY MEDICINE

## 2018-10-29 PROCEDURE — 90471 IMMUNIZATION ADMIN: CPT | Performed by: FAMILY MEDICINE

## 2018-10-29 RX ORDER — CLONAZEPAM 1 MG/1
TABLET ORAL
Qty: 180 TABLET | Refills: 0 | Status: SHIPPED | OUTPATIENT
Start: 2018-10-29 | End: 2018-10-29

## 2018-10-29 RX ORDER — OXYCODONE HYDROCHLORIDE 20 MG/1
1-2 TABLET ORAL 4 TIMES DAILY PRN
Qty: 98 TABLET | Refills: 0 | Status: SHIPPED | OUTPATIENT
Start: 2018-11-02 | End: 2018-11-07

## 2018-10-29 ASSESSMENT — PAIN SCALES - GENERAL: PAINLEVEL: WORST PAIN (10)

## 2018-10-29 NOTE — PROGRESS NOTES
SUBJECTIVE:   Jose Luis Gonzáles is a 48 year old male who presents to clinic today for the following health issues:      Chronic Pain Follow-Up       Type / Location of Pain: Neck  Analgesia/pain control:       Recent changes:  worse      Overall control: Inadequate pain control  Activity level/function:      Daily activities:  Unable to perform most daily activities - chores, hobbies, social activities, driving    Work:  Unable to work  Adverse effects:  No  Adherance    Taking medication as directed?  Yes    Participating in other treatments: yes- Pain Management   Risk Factors:    Sleep:  Poor    Mood/anxiety:  worsened    Recent family or social stressors:  none noted    Other aggravating factors: none  PHQ-9 SCORE 9/26/2016 2/28/2017 11/14/2017   Total Score - - -   Total Score 8 13 8     ODALYS-7 SCORE 9/26/2016 2/28/2017 11/14/2017   Total Score - - -   Total Score 12 20 10     Encounter-Level CSA - 12/12/2016:          Controlled Substance Agreement - Scan on 12/13/2016 10:02 AM : CONTROLLED SUBSTANCE AGREEMENT 12/12/16 (below)                Amount of exercise or physical activity: 1-2 day/week for an average of 15-30 minutes    Problems taking medications regularly: No    Medication side effects: with changes in medication patient stated he feels sick    Diet: regular (no restrictions)    SUBJECTIVE:  Here today in follow-up with chronic pain.  Actually seen in the office with his mother which is nice to be able to discuss the plan from a long-term basis.  I discussed with her her current understanding of limitations on safe dosages of pain treatment, length of treatment, etc.  He desires to have Jose Luis off of medication altogether and she even mentioned the possibility of treatment, so I explored that with Jose Luis.  He understands that he has been struggling with the medication but does not think that he necessarily needs treatment.  Has met with the pain clinic and will be setting up follow-up with them.   "Has struggled with some of the reduction of medication and that is understandable given how high of a dosage he started with.  So we discussed a slow taper with a simplified regimen.  But I do think it is worthwhile discussing with the pain folks because of the multi faceted approach they can offer such as psychology, physical therapy, etc.  Not just medication management.  On that standpoint I did discuss whether he might not benefit from methadone or Suboxone rather than simply just tapering back on oxycodone and this is something to think about.  In the meantime we will continue with our plan.    Review of systems otherwise negative.  Past medical, family, and social history reviewed and updated in chart.    OBJECTIVE:  /84 (BP Location: Right arm, Patient Position: Chair, Cuff Size: Adult Large)  Pulse 106  Temp 98.4  F (36.9  C) (Oral)  Ht 1.778 m (5' 10\")  Wt 80.7 kg (178 lb)  SpO2 95%  BMI 25.54 kg/m2  Alert, pleasant, upbeat, and in no apparent discomfort.  S1 and S2 normal, no murmurs, clicks, gallops or rubs. Regular rate and rhythm. Chest is clear; no wheezes or rales. No edema or JVD.  Past labs reviewed with the patient.     ASSESSMENT / PLAN:  (G89.4) Chronic pain syndrome  (primary encounter diagnosis)  Comment: Currently on a regimen of 7 tablets/day.  We will continue for 2 more weeks and then decrease likely 1 tablet/day for another couple of weeks  Plan: oxyCODONE HCl (ROXICODONE) 20 MG TABS immediate        release tablet            (F41.9) Anxiety  Comment: Off diazepam currently.  Not using clonazepam that much but I agree with the pain clinic recommendations of approaching both of these issues  Plan: DISCONTINUED: clonazePAM (KLONOPIN) 1 MG tablet            (M54.2) Neck pain  Comment:   Plan: oxyCODONE HCl (ROXICODONE) 20 MG TABS immediate        release tablet            (Z23) Need for prophylactic vaccination and inoculation against influenza  Comment:   Plan: FLU VACCINE, SPLIT " VIRUS, IM (QUADRIVALENT)         [42977]- >3 YRS, Vaccine Administration,         Initial [30111]            Follow up 1 month  JANET Cui MD    (Chart documentation completed in part with Dragon voice-recognition software.  Even though reviewed some grammatical, spelling, and word errors may remain.)       Injectable Influenza Immunization Documentation    1.  Is the person to be vaccinated sick today?   No    2. Does the person to be vaccinated have an allergy to a component   of the vaccine?   No  Egg Allergy Algorithm Link    3. Has the person to be vaccinated ever had a serious reaction   to influenza vaccine in the past?   No    4. Has the person to be vaccinated ever had Guillain-Barré syndrome?   No    Form completed by Venus Marinelli MA on 10/29/2018 at 5:56 PM

## 2018-10-29 NOTE — TELEPHONE ENCOUNTER
Received call from patient who is requesting to speak to someone in regards to his medications. He states that he is getting sicker and it's harder to wean. Patient is interested in detox and possible suboxone/methadone. Please call. Phone # [phone]      Arcelia Ellison    [facility]

## 2018-10-29 NOTE — MR AVS SNAPSHOT
After Visit Summary   10/29/2018    Jose Luisdavion Gonzáles    MRN: [id number]           Patient Information     Date Of Birth          1970        Visit Information        Provider Department      10/29/2018 5:40 PM Tonie Cui MD [facility]        Today's Diagnoses     Chronic pain syndrome    -  1    Anxiety        Neck pain        Need for prophylactic vaccination and inoculation against influenza           Follow-ups after your visit        Follow-up notes from your care team     Return in about 1 month (around 11/29/2018) for Follow up on pain.      Your next 10 appointments already scheduled     Nov 07, 2018  2:40 PM CST   Telephone Visit with Tonie Cui MD   [facility]    [address] [address] [phone]           Note: this is not an onsite visit; there is no need to come to the facility.              Who to contact     If you have questions or need follow up information about today's clinic visit or your schedule please contact [facility] directly at [phone].  Normal or non-critical lab and imaging results will be communicated to you by Condition Onehart, letter or phone within 4 business days after the clinic has received the results. If you do not hear from us within 7 days, please contact the clinic through Condition Onehart or phone. If you have a critical or abnormal lab result, we will notify you by phone as soon as possible.  Submit refill requests through TouchPo Android POS or call your pharmacy and they will forward the refill request to us. Please allow 3 business days for your refill to be completed.          Additional Information About Your Visit        Condition Onehart Information     TouchPo Android POS gives you secure access to your electronic health record. If you see a primary care provider, you can also send messages to your care team and make appointments. If you have questions,  "please call your primary care clinic.  If you do not have a primary care provider, please call [phone] and they will assist you.        Care EveryWhere ID     This is your Care EveryWhere ID. This could be used by other organizations to access your [facility] medical records  [id number]        Your Vitals Were     Pulse Temperature Height Pulse Oximetry BMI (Body Mass Index)       106 98.4  F (36.9  C) (Oral) 1.778 m (5' 10\") 95% 25.54 kg/m2        Blood Pressure from Last 3 Encounters:   10/29/18 140/84   09/05/18 (!) 165/105   08/20/18 116/80    Weight from Last 3 Encounters:   10/29/18 80.7 kg (178 lb)   09/05/18 84.8 kg (187 lb)   07/31/18 75.7 kg (166 lb 14.4 oz)              We Performed the Following     FLU VACCINE, SPLIT VIRUS, IM (QUADRIVALENT) [22477]- >3 YRS     Vaccine Administration, Initial [34736]          Today's Medication Changes          These changes are accurate as of 10/29/18 11:59 PM.  If you have any questions, ask your nurse or doctor.               These medicines have changed or have updated prescriptions.        Dose/Directions    oxyCODONE HCl 20 MG Tabs immediate release tablet   Commonly known as:  ROXICODONE   This may have changed:  These instructions start on 11/2/2018. If you are unsure what to do until then, ask your doctor or other care provider.   Used for:  Chronic pain syndrome, Neck pain   Changed by:  Tonie Cui MD        Dose:  1-2 tablet   Start taking on:  11/2/2018   Take 1-2 tablets by mouth 4 times daily as needed (pain) Maximum 7 tablets/day   Quantity:  98 tablet   Refills:  0         Stop taking these medicines if you haven't already. Please contact your care team if you have questions.     clonazePAM 1 MG tablet   Commonly known as:  klonoPIN   Stopped by:  Tonie Cui MD                Where to get your medicines      Some of these will need a paper prescription and others can be bought over the counter.  Ask your nurse if you have " questions.     Bring a paper prescription for each of these medications     oxyCODONE HCl 20 MG Tabs immediate release tablet               Information about OPIOIDS     PRESCRIPTION OPIOIDS: WHAT YOU NEED TO KNOW   We gave you an opioid (narcotic) pain medicine. It is important to manage your pain, but opioids are not always the best choice. You should first try all the other options your care team gave you. Take this medicine for as short a time (and as few doses) as possible.    Some activities can increase your pain, such as bandage changes or therapy sessions. It may help to take your pain medicine 30 to 60 minutes before these activities. Reduce your stress by getting enough sleep, working on hobbies you enjoy and practicing relaxation or meditation. Talk to your care team about ways to manage your pain beyond prescription opioids.    These medicines have risks:    DO NOT drive when on new or higher doses of pain medicine. These medicines can affect your alertness and reaction times, and you could be arrested for driving under the influence (DUI). If you need to use opioids long-term, talk to your care team about driving.    DO NOT operate heavy machinery    DO NOT do any other dangerous activities while taking these medicines.    DO NOT drink any alcohol while taking these medicines.     If the opioid prescribed includes acetaminophen, DO NOT take with any other medicines that contain acetaminophen. Read all labels carefully. Look for the word  acetaminophen  or  Tylenol.  Ask your pharmacist if you have questions or are unsure.    You can get addicted to pain medicines, especially if you have a history of addiction (chemical, alcohol or substance dependence). Talk to your care team about ways to reduce this risk.    All opioids tend to cause constipation. Drink plenty of water and eat foods that have a lot of fiber, such as fruits, vegetables, prune juice, apple juice and high-fiber cereal. Take a laxative  (Miralax, milk of magnesia, Colace, Senna) if you don t move your bowels at least every other day. Other side effects include upset stomach, sleepiness, dizziness, throwing up, tolerance (needing more of the medicine to have the same effect), physical dependence and slowed breathing.    Store your pills in a secure place, locked if possible. We will not replace any lost or stolen medicine. If you don t finish your medicine, please throw away (dispose) as directed by your pharmacist. The [place] Pollution Control Agency has more information about safe disposal: https://www.pca.[facility].mn.us/living-green/managing-unwanted-medications         Primary Care Provider Office Phone # Fax #    Tonie Zach Cui [phone] [address] [address]        Equal Access to Services     GIANCARLO LIU : Hadii bishop jaramillo hadasho Soomaali, waaxda luqadaha, qaybta kaalmada adealessiayada, jose angel kilgore . So [facility] [phone].    ATENCIÓN: Si habla español, tiene a ceron disposición servicios gratuitos de asistencia lingüística. Manuelito al [phone].    We comply with applicable federal civil rights laws and [place] laws. We do not discriminate on the basis of race, color, national origin, age, disability, sex, sexual orientation, or gender identity.            Thank you!     Thank you for choosing [facility]  for your care. Our goal is always to provide you with excellent care. Hearing back from our patients is one way we can continue to improve our services. Please take a few minutes to complete the written survey that you may receive in the mail after your visit with us. Thank you!             Your Updated Medication List - Protect others around you: Learn how to safely use, store and throw away your medicines at www.disposemymeds.org.          This list is accurate as of 10/29/18 11:59 PM.  Always use your most recent med list.                   Brand  Name Dispense Instructions for use Diagnosis    adapalene 0.1 % gel    DIFFERIN    45 g    Apply topically At Bedtime    Acne vulgaris       cloNIDine 0.1 MG tablet    CATAPRES    60 tablet    Take 1 tablet (0.1 mg) by mouth 2 times daily    Chronic pain syndrome, Hypertension goal BP (blood pressure) < 140/90       finasteride 5 MG tablet    PROSCAR    30 tablet    Take 1 tablet (5 mg) by mouth daily    Male pattern baldness       naloxone nasal spray    NARCAN    0.2 mL    Spray 1 spray (4 mg) into one nostril alternating nostrils as needed for opioid reversal every 2-3 minutes until assistance arrives    Chronic pain syndrome, Neck pain       omeprazole 20 MG tablet    priLOSEC OTC    90 tablet    Take 1 tablet (20 mg) by mouth daily    Gastroesophageal reflux disease without esophagitis       ondansetron 4 MG tablet    ZOFRAN    30 tablet    Take 1-2 tablets (4-8 mg) by mouth 3 times daily as needed for nausea    Nausea       oxyCODONE HCl 20 MG Tabs immediate release tablet   Start taking on:  11/2/2018    ROXICODONE    98 tablet    Take 1-2 tablets by mouth 4 times daily as needed (pain) Maximum 7 tablets/day    Chronic pain syndrome, Neck pain       polyethylene glycol powder    MIRALAX/GLYCOLAX    527 g    STIR 17 GM OF POWDER (SEE MINGO INSIDE CAP) IN 8-OZ OF LIQUID UNTIL COMPLETELY DISSOLVED. DRINK THE SOLUTION DAILY OR AS DIRECTED.    Chronic constipation       tretinoin 0.1 % cream    RETIN-A    45 g    Apply topically At Bedtime    Acne vulgaris

## 2018-10-29 NOTE — TELEPHONE ENCOUNTER
Called patient. Provided with Detox information. He is struggling with his wean. Encouraged patient to call early for beds as detox may be his best option as he is not tolerating the wean.     Cassandra HARLEYN, RN Care Coordinator  [facility]

## 2018-10-31 ENCOUNTER — TELEPHONE (OUTPATIENT)
Dept: FAMILY MEDICINE | Facility: CLINIC | Age: 48
End: 2018-10-31

## 2018-10-31 NOTE — TELEPHONE ENCOUNTER
Reason for Call:  Other prescription    Detailed comments: patient asking for a prior authorization for Roxicodone 30 mg up to 5 a day? Trying to get this done by 11/2/18.    Phone Number Patient can be reached at: Home number on file [phone] (home)today going to dentist for root canal if patient does not answer phone    Best Time: any    Can we leave a detailed message on this number? YES    Call taken on 10/31/2018 at 1:55 PM by Lili Del Angel

## 2018-10-31 NOTE — TELEPHONE ENCOUNTER
oxyCODONE HCl (ROXICODONE) 20 MG TABS immediate release tablet.    Plan does not cover more than 6 tablets daily.  Phone# [phone]    ID# [id number]    PA or alternative    Arcelia Rodriguez

## 2018-11-01 ENCOUNTER — TELEPHONE (OUTPATIENT)
Dept: PALLIATIVE MEDICINE | Facility: CLINIC | Age: 48
End: 2018-11-01

## 2018-11-01 NOTE — TELEPHONE ENCOUNTER
"This was marked as \"urgent\".  [employer] can take up to 48 hours for determination on urgent requests but we will follow up tomorrow for status update.   "

## 2018-11-01 NOTE — TELEPHONE ENCOUNTER
Prior Authorization Approval    Authorization Effective Date: 10/31/2018  Authorization Expiration Date: 5/31/2019  Medication: PA for oxyCODONE HCl (ROXICODONE) 20 MG TABS immediate release tablet approved   Approved Dose/Quantity:  Reference #:     Insurance Company: [employer] - Phone [phone] Fax [phone]  Expected CoPay:       CoPay Card Available:      Foundation Assistance Needed:    Which Pharmacy is filling the prescription (Not needed for infusion/clinic administered): [facility] [address] [place] [address] [address] AT [facility]  Pharmacy Notified: Yes  Patient Notified: Yes    Per pharmacy refill too soon until 11/3/18 due to patient filling medication on 10/26/18.  Will call me directly if there is any problem processing medication.

## 2018-11-01 NOTE — TELEPHONE ENCOUNTER
Received call from patient who states he saw his PCP, Dr. Cui, who recommended that he be seen by Dr. Davidson or Dr. Amy Molina. Advised that he is currently being seen by Samaria Nagel and that neither of those providers are located at the [facility]. Asked him if he is wanting to switch providers then that would need to be reviewed. He stated that this is what his doctor told him to do. Routing to review request.       Arcelia Ellison    [facility]

## 2018-11-01 NOTE — TELEPHONE ENCOUNTER
I don't think that any provider here would be the right answer for him if he is having problems with outpatient wean. Samaria Nagel, ANDRE can help to manage this as well as either of the mentioned providers, but the issue really is the dosing of his med.  If Dr. Cui wants to go slower than what was recommended by Samaria BREEN, but a wean every 2-4 weeks is standard given the amount of medication he is on.     He can contact also detox as he was advised.    Otherwise, Dr. Cui can do an addiction medicine referral, but he may have trouble with the outpatient requirements and be in the same place re: looking at Detox.    There are addiction providers at [place] and [facility], likely to get in faster at [place].    Samaria Yadav MD  [facility]

## 2018-11-01 NOTE — TELEPHONE ENCOUNTER
PA Initiation    Medication: PA for oxyCODONE HCl (ROXICODONE) 20 MG TABS immediate release tablet  Insurance Company: [employer] - Phone [phone] Fax [phone]  Pharmacy Filling the Rx: [facility] [address] [place] [address] [address] [facility]  Filling Pharmacy Phone: [phone]  Filling Pharmacy Fax:    Start Date: 11/1/2018    THIS HAS BEEN SUBMITTED BY THE PRIOR-AUTHORIZATION TEAM. ANY QUESTIONS PLEASE CALL [phone]. THANK YOU

## 2018-11-07 ENCOUNTER — VIRTUAL VISIT (OUTPATIENT)
Dept: FAMILY MEDICINE | Facility: CLINIC | Age: 48
End: 2018-11-07
Payer: COMMERCIAL

## 2018-11-07 DIAGNOSIS — G89.4 CHRONIC PAIN SYNDROME: ICD-10-CM

## 2018-11-07 DIAGNOSIS — M54.2 NECK PAIN: ICD-10-CM

## 2018-11-07 PROCEDURE — 99441 ZZC PHYSICIAN TELEPHONE EVALUATION 5-10 MIN: CPT | Performed by: FAMILY MEDICINE

## 2018-11-07 RX ORDER — OXYCODONE HYDROCHLORIDE 20 MG/1
1-2 TABLET ORAL 4 TIMES DAILY PRN
Qty: 84 TABLET | Refills: 0 | Status: ON HOLD | OUTPATIENT
Start: 2018-11-16 | End: 2018-11-27

## 2018-11-07 NOTE — MR AVS SNAPSHOT
After Visit Summary   11/7/2018    Jose Luisdavion Gonzáles    MRN: [id number]           Patient Information     Date Of Birth          1970        Visit Information        Provider Department      11/7/2018 2:40 PM Tonie Cui MD [facility]        Today's Diagnoses     Chronic pain syndrome        Neck pain           Follow-ups after your visit        Follow-up notes from your care team     Return in about 1 month (around 12/7/2018) for Follow up on pain.      Who to contact     If you have questions or need follow up information about today's clinic visit or your schedule please contact [facility] directly at [phone].  Normal or non-critical lab and imaging results will be communicated to you by MyChart, letter or phone within 4 business days after the clinic has received the results. If you do not hear from us within 7 days, please contact the clinic through [facility] [employer] or phone. If you have a critical or abnormal lab result, we will notify you by phone as soon as possible.  Submit refill requests through [employer] or call your pharmacy and they will forward the refill request to us. Please allow 3 business days for your refill to be completed.          Additional Information About Your Visit        MyChart Information     [employer] gives you secure access to your electronic health record. If you see a primary care provider, you can also send messages to your care team and make appointments. If you have questions, please call your primary care clinic.  If you do not have a primary care provider, please call [phone] and they will assist you.        Care EveryWhere ID     This is your Care EveryWhere ID. This could be used by other organizations to access your [place] medical records  [id number]         Blood Pressure from Last 3 Encounters:   10/29/18 140/84   09/05/18 (!) 165/105   08/20/18 116/80    Weight from Last 3 Encounters:   10/29/18 80.7 kg  (178 lb)   09/05/18 84.8 kg (187 lb)   07/31/18 75.7 kg (166 lb 14.4 oz)              Today, you had the following     No orders found for display         Today's Medication Changes          These changes are accurate as of 11/7/18 11:59 PM.  If you have any questions, ask your nurse or doctor.               These medicines have changed or have updated prescriptions.        Dose/Directions    oxyCODONE HCl 20 MG Tabs immediate release tablet   Commonly known as:  ROXICODONE   This may have changed:    - additional instructions  - These instructions start on 11/16/2018. If you are unsure what to do until then, ask your doctor or other care provider.   Used for:  Chronic pain syndrome, Neck pain        Dose:  1-2 tablet   Start taking on:  11/16/2018   Take 1-2 tablets by mouth 4 times daily as needed (pain) Maximum 6 tablets/day   Quantity:  84 tablet   Refills:  0            Where to get your medicines      Some of these will need a paper prescription and others can be bought over the counter.  Ask your nurse if you have questions.     Bring a paper prescription for each of these medications     oxyCODONE HCl 20 MG Tabs immediate release tablet               Information about OPIOIDS     PRESCRIPTION OPIOIDS: WHAT YOU NEED TO KNOW   We gave you an opioid (narcotic) pain medicine. It is important to manage your pain, but opioids are not always the best choice. You should first try all the other options your care team gave you. Take this medicine for as short a time (and as few doses) as possible.    Some activities can increase your pain, such as bandage changes or therapy sessions. It may help to take your pain medicine 30 to 60 minutes before these activities. Reduce your stress by getting enough sleep, working on hobbies you enjoy and practicing relaxation or meditation. Talk to your care team about ways to manage your pain beyond prescription opioids.    These medicines have risks:    DO NOT drive when on new or  higher doses of pain medicine. These medicines can affect your alertness and reaction times, and you could be arrested for driving under the influence (DUI). If you need to use opioids long-term, talk to your care team about driving.    DO NOT operate heavy machinery    DO NOT do any other dangerous activities while taking these medicines.    DO NOT drink any alcohol while taking these medicines.     If the opioid prescribed includes acetaminophen, DO NOT take with any other medicines that contain acetaminophen. Read all labels carefully. Look for the word  acetaminophen  or  Tylenol.  Ask your pharmacist if you have questions or are unsure.    You can get addicted to pain medicines, especially if you have a history of addiction (chemical, alcohol or substance dependence). Talk to your care team about ways to reduce this risk.    All opioids tend to cause constipation. Drink plenty of water and eat foods that have a lot of fiber, such as fruits, vegetables, prune juice, apple juice and high-fiber cereal. Take a laxative (Miralax, milk of magnesia, Colace, Senna) if you don t move your bowels at least every other day. Other side effects include upset stomach, sleepiness, dizziness, throwing up, tolerance (needing more of the medicine to have the same effect), physical dependence and slowed breathing.    Store your pills in a secure place, locked if possible. We will not replace any lost or stolen medicine. If you don t finish your medicine, please throw away (dispose) as directed by your pharmacist. The [place] Pollution Control Agency has more information about safe disposal: https://www.pca.state.[place]/living-green/managing-unwanted-medications         Primary Care Provider Office Phone # Fax #    Tonie Ciu [phone] [address] [facility] [address]        Equal Access to Services     GIANCARLO LIU AH: jennifer Trejo qaybta kaalmada  jose angel friedmanalessia kwonaan ah. So [facility] [phone].    ATENCIÓN: Si habla mary, tiene a ceron disposición servicios gratuitos de asistencia lingüística. Manuelito al [phone].    We comply with applicable federal civil rights laws and [place] laws. We do not discriminate on the basis of race, color, national origin, age, disability, sex, sexual orientation, or gender identity.            Thank you!     Thank you for choosing [facility]  for your care. Our goal is always to provide you with excellent care. Hearing back from our patients is one way we can continue to improve our services. Please take a few minutes to complete the written survey that you may receive in the mail after your visit with us. Thank you!             Your Updated Medication List - Protect others around you: Learn how to safely use, store and throw away your medicines at www.disposemymeds.org.          This list is accurate as of 11/7/18 11:59 PM.  Always use your most recent med list.                   Brand Name Dispense Instructions for use Diagnosis    adapalene 0.1 % gel    DIFFERIN    45 g    Apply topically At Bedtime    Acne vulgaris       cloNIDine 0.1 MG tablet    CATAPRES    60 tablet    Take 1 tablet (0.1 mg) by mouth 2 times daily    Chronic pain syndrome, Hypertension goal BP (blood pressure) < 140/90       finasteride 5 MG tablet    PROSCAR    30 tablet    Take 1 tablet (5 mg) by mouth daily    Male pattern baldness       naloxone nasal spray    NARCAN    0.2 mL    Spray 1 spray (4 mg) into one nostril alternating nostrils as needed for opioid reversal every 2-3 minutes until assistance arrives    Chronic pain syndrome, Neck pain       omeprazole 20 MG tablet    priLOSEC OTC    90 tablet    Take 1 tablet (20 mg) by mouth daily    Gastroesophageal reflux disease without esophagitis       ondansetron 4 MG tablet    ZOFRAN    30 tablet    Take 1-2 tablets (4-8 mg) by mouth 3 times daily as  needed for nausea    Nausea       oxyCODONE HCl 20 MG Tabs immediate release tablet   Start taking on:  11/16/2018    ROXICODONE    84 tablet    Take 1-2 tablets by mouth 4 times daily as needed (pain) Maximum 6 tablets/day    Chronic pain syndrome, Neck pain       polyethylene glycol powder    MIRALAX/GLYCOLAX    527 g    STIR 17 GM OF POWDER (SEE MINGO INSIDE CAP) IN 8-OZ OF LIQUID UNTIL COMPLETELY DISSOLVED. DRINK THE SOLUTION DAILY OR AS DIRECTED.    Chronic constipation       tretinoin 0.1 % cream    RETIN-A    45 g    Apply topically At Bedtime    Acne vulgaris

## 2018-11-07 NOTE — PROGRESS NOTES
"Jose Luis Gonzáles is a 48 year old male who is being evaluated via a telephone visit.      The patient has been notified of following (by M.A) Venus Marinelli MA on 11/7/2018 at 2:41 PM       \"We have found that certain health care needs can be provided without the need for a physical exam.  This service lets us provide the care you need with a short phone conversation.  If a prescription is necessary we can send it directly to your pharmacy.  If lab work is needed we can place an order for that and you can then stop by our lab to have the test done at a later time.    This telephone visit will be conducted via 3 way call with the you (the patient) , the physician/provider, and a me all on the line at the same time.  This allows your physician/provider to have the phone conversation with you while I will be taking notes for your medical record.  We will have full access to your [place] medical record during this entire phone call.    Since this is like an office visit,  will bill your insurance company for this service.  Please check with your medical insurance if this type of telephone/virtual is covered . You may be responsible for the cost of this service if insurance coverage is denied.  The typical cost is $30 (10min), $59(11-20min) and $85 (21-30min)     If during the course of the call the physician/provider feels a telephone visit is not appropriate, you will not be charged for this service\"    Consent has been obtained for this service by care team member: yes.  See the scanned image in the medical record.    S: The history as provided by the patient to the provider during this 3 way call include chart review and with patient     Pertinent parts of the the patient's medical history reviewed and confirmed by the provider included : chart      Total time of call between patient and provider was 8 minutes     JANET Cui M.D.  (MD signature)  ===================================================    I " have reviewed the note as documented above.  This accurately captures the substance of my conversation with the patient,    Additional provider notes:  Called to follow up on chronic pain and taper.  Is struggling with the taper and we discussed extensively adjunctive treatment, multi-disciplinary approach, etc.  Has seen pain clinic and plans follow up there as well.  I strongly encouraged it.      ASSESSMENT / PLAN:  (G89.4) Chronic pain syndrome  Comment: will hold x 2 weks, then continue taper as per the plan  Plan: oxyCODONE HCl (ROXICODONE) 20 MG TABS immediate        release tablet            (M54.2) Neck pain  Comment:   Plan: oxyCODONE HCl (ROXICODONE) 20 MG TABS immediate        release tablet          Follow up 1 month   JANET Cui M.D.

## 2018-11-13 ENCOUNTER — TELEPHONE (OUTPATIENT)
Dept: PALLIATIVE MEDICINE | Facility: CLINIC | Age: 48
End: 2018-11-13

## 2018-11-13 NOTE — TELEPHONE ENCOUNTER
Called patient and LVM (Ok per chart) for patient to reach out to PCP for addiction med referral to discuss this with them Otherwise contact detox (as discussed before) Provided number for patient questions.     Isabel HARLEYN-RN Care Coordinator  [facility]

## 2018-11-23 ENCOUNTER — TELEPHONE (OUTPATIENT)
Dept: FAMILY MEDICINE | Facility: CLINIC | Age: 48
End: 2018-11-23

## 2018-11-23 NOTE — TELEPHONE ENCOUNTER
Reason for call:  Patient reporting a symptom    Symptom or request: Needs Rule 25 asap     Duration (how long have symptoms been present): months    Have you been treated for this before? Yes    Additional comments: He was very upset said Pain Management put in some kind of note for him to get admitted but needs family MD to approve.  Sending High Priority Message.    Phone Number patient can be reached at:  Cell number on file:    Telephone Information:   Mobile [phone]     Best Time:  any    Can we leave a detailed message on this number:  YES    Call taken on 11/23/2018 at 10:25 AM by Phoebe Corley

## 2018-11-23 NOTE — TELEPHONE ENCOUNTER
"Patient needs to go in for medical detox - not sure where he's going. He wants MD to advise where he can go for help.   \"I am medically addicted to opioids\". He is crying and I advised that message will be relayed to Dr. Cui. Staff will call him once we hear from provider.   He understands.   LXIONG3, MEDICAL ASSISTANT      "

## 2018-11-23 NOTE — TELEPHONE ENCOUNTER
Team, please call patient and ask where he is going to be admitted to and then route high priority to Dr. Cui to advise on this request.     Livier Stokes RN

## 2018-11-23 NOTE — TELEPHONE ENCOUNTER
I suggest [facility]- they have a mental health department there that can assess in EMERGENCY DEPARTMENT.

## 2018-11-24 ENCOUNTER — HOSPITAL ENCOUNTER (INPATIENT)
Facility: CLINIC | Age: 48
LOS: 5 days | Discharge: HOME | End: 2018-11-29
Attending: EMERGENCY MEDICINE | Admitting: PSYCHIATRY & NEUROLOGY
Payer: COMMERCIAL

## 2018-11-24 DIAGNOSIS — F13.20 BENZODIAZEPINE DEPENDENCE (H): ICD-10-CM

## 2018-11-24 DIAGNOSIS — F11.20 CONTINUOUS OPIOID DEPENDENCE (H): ICD-10-CM

## 2018-11-24 DIAGNOSIS — F10.29 ALCOHOL DEPENDENCE WITH UNSPECIFIED ALCOHOL-INDUCED DISORDER (H): ICD-10-CM

## 2018-11-24 DIAGNOSIS — G89.4 CHRONIC PAIN SYNDROME: ICD-10-CM

## 2018-11-24 DIAGNOSIS — M54.2 NECK PAIN: ICD-10-CM

## 2018-11-24 LAB
ALCOHOL BREATH TEST: 0.22 (ref 0–0.01)
AMPHETAMINES UR QL SCN: NEGATIVE
BARBITURATES UR QL: NEGATIVE
BENZODIAZ UR QL: POSITIVE
CANNABINOIDS UR QL SCN: POSITIVE
COCAINE UR QL: NEGATIVE
ETHANOL UR QL SCN: POSITIVE
OPIATES UR QL SCN: POSITIVE

## 2018-11-24 PROCEDURE — 99284 EMERGENCY DEPT VISIT MOD MDM: CPT | Mod: Z6 | Performed by: EMERGENCY MEDICINE

## 2018-11-24 PROCEDURE — 80320 DRUG SCREEN QUANTALCOHOLS: CPT | Performed by: EMERGENCY MEDICINE

## 2018-11-24 PROCEDURE — 99285 EMERGENCY DEPT VISIT HI MDM: CPT | Mod: 25 | Performed by: EMERGENCY MEDICINE

## 2018-11-24 PROCEDURE — 25000132 ZZH RX MED GY IP 250 OP 250 PS 637: Performed by: PSYCHIATRY & NEUROLOGY

## 2018-11-24 PROCEDURE — 12800012 ZZH R&B CD MH INTERMEDIATE ADULT

## 2018-11-24 PROCEDURE — 80307 DRUG TEST PRSMV CHEM ANLYZR: CPT | Performed by: EMERGENCY MEDICINE

## 2018-11-24 PROCEDURE — 82075 ASSAY OF BREATH ETHANOL: CPT | Performed by: EMERGENCY MEDICINE

## 2018-11-24 RX ORDER — ATENOLOL 50 MG/1
50 TABLET ORAL DAILY PRN
Status: DISCONTINUED | OUTPATIENT
Start: 2018-11-24 | End: 2018-11-29 | Stop reason: HOSPADM

## 2018-11-24 RX ORDER — BISACODYL 10 MG
10 SUPPOSITORY, RECTAL RECTAL DAILY PRN
Status: DISCONTINUED | OUTPATIENT
Start: 2018-11-24 | End: 2018-11-29 | Stop reason: HOSPADM

## 2018-11-24 RX ORDER — LANOLIN ALCOHOL/MO/W.PET/CERES
100 CREAM (GRAM) TOPICAL DAILY
Status: DISPENSED | OUTPATIENT
Start: 2018-11-24 | End: 2018-11-27

## 2018-11-24 RX ORDER — PANTOPRAZOLE SODIUM 40 MG/1
40 TABLET, DELAYED RELEASE ORAL
Status: DISCONTINUED | OUTPATIENT
Start: 2018-11-25 | End: 2018-11-29 | Stop reason: HOSPADM

## 2018-11-24 RX ORDER — NAPROXEN 250 MG/1
250 TABLET ORAL 2 TIMES DAILY WITH MEALS
Status: DISCONTINUED | OUTPATIENT
Start: 2018-11-24 | End: 2018-11-25

## 2018-11-24 RX ORDER — HYDROXYZINE HYDROCHLORIDE 25 MG/1
25 TABLET, FILM COATED ORAL EVERY 4 HOURS PRN
Status: DISCONTINUED | OUTPATIENT
Start: 2018-11-24 | End: 2018-11-29 | Stop reason: HOSPADM

## 2018-11-24 RX ORDER — DIAZEPAM 5 MG
5-20 TABLET ORAL EVERY 30 MIN PRN
Status: DISCONTINUED | OUTPATIENT
Start: 2018-11-24 | End: 2018-11-29 | Stop reason: HOSPADM

## 2018-11-24 RX ORDER — FOLIC ACID 1 MG/1
1 TABLET ORAL DAILY
Status: DISCONTINUED | OUTPATIENT
Start: 2018-11-24 | End: 2018-11-29 | Stop reason: HOSPADM

## 2018-11-24 RX ORDER — MULTIPLE VITAMINS W/ MINERALS TAB 9MG-400MCG
1 TAB ORAL DAILY
Status: DISCONTINUED | OUTPATIENT
Start: 2018-11-24 | End: 2018-11-29 | Stop reason: HOSPADM

## 2018-11-24 RX ORDER — NAPROXEN SODIUM 220 MG
220 TABLET ORAL 2 TIMES DAILY WITH MEALS
Status: DISCONTINUED | OUTPATIENT
Start: 2018-11-24 | End: 2018-11-24 | Stop reason: CLARIF

## 2018-11-24 RX ORDER — OMEPRAZOLE 20 MG/1
20 TABLET, DELAYED RELEASE ORAL DAILY
Status: DISCONTINUED | OUTPATIENT
Start: 2018-11-24 | End: 2018-11-24 | Stop reason: CLARIF

## 2018-11-24 RX ORDER — CYCLOBENZAPRINE HCL 10 MG
10 TABLET ORAL 3 TIMES DAILY PRN
Status: DISCONTINUED | OUTPATIENT
Start: 2018-11-24 | End: 2018-11-29 | Stop reason: HOSPADM

## 2018-11-24 RX ORDER — GABAPENTIN 400 MG/1
400 CAPSULE ORAL 3 TIMES DAILY PRN
Status: DISCONTINUED | OUTPATIENT
Start: 2018-11-24 | End: 2018-11-29 | Stop reason: HOSPADM

## 2018-11-24 RX ORDER — TRAZODONE HYDROCHLORIDE 50 MG/1
50 TABLET, FILM COATED ORAL
Status: DISCONTINUED | OUTPATIENT
Start: 2018-11-24 | End: 2018-11-29 | Stop reason: HOSPADM

## 2018-11-24 RX ORDER — PHENOBARBITAL 64.8 MG/1
64.8 TABLET ORAL 3 TIMES DAILY
Status: DISCONTINUED | OUTPATIENT
Start: 2018-11-24 | End: 2018-11-25

## 2018-11-24 RX ORDER — ALUMINA, MAGNESIA, AND SIMETHICONE 2400; 2400; 240 MG/30ML; MG/30ML; MG/30ML
30 SUSPENSION ORAL EVERY 4 HOURS PRN
Status: DISCONTINUED | OUTPATIENT
Start: 2018-11-24 | End: 2018-11-29 | Stop reason: HOSPADM

## 2018-11-24 RX ORDER — ACETAMINOPHEN 325 MG/1
650 TABLET ORAL EVERY 4 HOURS PRN
Status: DISCONTINUED | OUTPATIENT
Start: 2018-11-24 | End: 2018-11-29 | Stop reason: HOSPADM

## 2018-11-24 RX ORDER — BUPRENORPHINE 2 MG/1
4 TABLET SUBLINGUAL
Status: DISCONTINUED | OUTPATIENT
Start: 2018-11-24 | End: 2018-11-25

## 2018-11-24 RX ADMIN — DIAZEPAM 10 MG: 5 TABLET ORAL at 20:37

## 2018-11-24 RX ADMIN — NAPROXEN 250 MG: 250 TABLET ORAL at 17:06

## 2018-11-24 RX ADMIN — DIAZEPAM 10 MG: 5 TABLET ORAL at 16:58

## 2018-11-24 RX ADMIN — GABAPENTIN 400 MG: 400 CAPSULE ORAL at 20:37

## 2018-11-24 RX ADMIN — PHENOBARBITAL 64.8 MG: 64.8 TABLET ORAL at 16:58

## 2018-11-24 RX ADMIN — PHENOBARBITAL 64.8 MG: 64.8 TABLET ORAL at 20:37

## 2018-11-24 RX ADMIN — NICOTINE POLACRILEX 8 MG: 4 GUM, CHEWING ORAL at 16:58

## 2018-11-24 ASSESSMENT — ENCOUNTER SYMPTOMS
HALLUCINATIONS: 0
SEIZURES: 1

## 2018-11-24 ASSESSMENT — ACTIVITIES OF DAILY LIVING (ADL): GROOMING: INDEPENDENT

## 2018-11-24 NOTE — PROGRESS NOTES
11/24/18 1642   Patient Belongings   Did you bring any home meds/supplements to the hospital?  No   Patient Belongings other (see comments)   Disposition of Belongings Other (see comment)   Belongings Search Yes   Clothing Search Yes   Second Staff Montez     STORAGE BIN:   cigarettes, $10, lighter, copy of id, matches  A             Admission:  I am responsible for any personal items that are not sent to the safe or pharmacy.  [facility] is not responsible for loss, theft or damage of any property in my possession.    Signature:  _________________________________ Date: _______  Time: _____                                              Staff Signature:  ____________________________ Date: ________  Time: _____      2nd Staff person, if patient is unable/unwilling to sign:    Signature: ________________________________ Date: ________  Time: _____   Discharge:  [facility] has returned all of my personal belongings:    Signature: _________________________________ Date: ________  Time: _____                                          Staff Signature:  ____________________________ Date: ________  Time: _____

## 2018-11-24 NOTE — IP AVS SNAPSHOT
[facility]    [facility] [place] [id number]    Phone:  [phone]                                       After Visit Summary   11/24/2018    Jose Luis Christoph Gonzáles    MRN: [id number]           After Visit Summary Signature Page     I have received my discharge instructions, and my questions have been answered. I have discussed any challenges I see with this plan with the nurse or doctor.    ..........................................................................................................................................  Patient/Patient Representative Signature      ..........................................................................................................................................  Patient Representative Print Name and Relationship to Patient    ..................................................               ................................................  Date                                   Time    ..........................................................................................................................................  Reviewed by Signature/Title    ...................................................              ..............................................  Date                                               Time          22EPIC Rev 08/18

## 2018-11-24 NOTE — PHARMACY-ADMISSION MEDICATION HISTORY
Admission medication history for the November 24, 2018 admission is complete.     Interview sources:  Patient    Reliability of source: Patient was a moderate historian, knew most medications and dosing.    Medication compliance: Poor, Patient hasn't taken most medications that he takes scheduled daily in a week.    Preferred Outpatient Pharmacy: [facility] in [place]    Changes made to PTA medication list (reason)  Added: glycerin 2 mg suppository (per patient, stated it works better than polyethylene glycol)  Deleted: adapalene 0.1% gel (per patient)   tretinoin 0.1% cream (per patient)   polyethylene glycol powder (per patient, stated it wasn't effective)   ondansetron 4 mg (per patient, stated he only has taken it in the hospital and it wasn't effective)   clonidine 0.1 mg; take 1 tablet twice daily (per patient, stated it made him sick and MD should already know)  Changed: None    Additional medication history information:   1. Oxycodone 20 mg tablets   Patient stated that he takes usually 8 tablets daily. He said he already took 6 tablets today (11/24/18).   2. Naloxone spray   Patient stated he has the naloxone nasal spray at home still.     Prior to Admission Medication List:  Prior to Admission medications    Medication Sig Last Dose Taking? Auth Provider   finasteride (PROSCAR) 5 MG tablet Take 1 tablet (5 mg) by mouth daily Past Week at n/a Yes Tonie Cui MD   glycerin, adult, 2 g SUPP Suppository Place 1 suppository rectally daily as needed for constipation 11/24/2018 at am Yes Unknown, Entered By History   naloxone (NARCAN) nasal spray Spray 1 spray (4 mg) into one nostril alternating nostrils as needed for opioid reversal every 2-3 minutes until assistance arrives  Yes Tonie Cui MD   omeprazole (PRILOSEC OTC) 20 MG tablet Take 1 tablet (20 mg) by mouth daily Past Week at n/a Yes Tonie Cui MD   oxyCODONE HCl (ROXICODONE) 20 MG TABS immediate release tablet  Take 1-2 tablets by mouth 4 times daily as needed (pain) Maximum 6 tablets/day 11/24/2018 at Unknown time Yes Tonie Cui MD       Time spent: 40 minutes    Medication history completed by:   Nayeli Giron, Pharmacy Intern

## 2018-11-24 NOTE — DISCHARGE INSTRUCTIONS
Behavioral [facility] Discharge Planning and Instructions  THANK YOU FOR CHOOSING THE [facility]  [facility]: [phone]    Summary: You were admitted to and processed through [facility] on November 24, 2018 for detoxification from alcohol and sedative hypnotics.  A medical examination was performed that included lab work. You have met with a  and opted for outpatient treatment  Please make your recovery a priority, Mr. Gonzáles.     Main Diagnosis:  Alcohol Dependence; Sedative Hypnotic Dependence    Major Treatments, Procedures and Findings:  You were detoxified from alcohol and sedative hypnotics using the appropriate protocol(s). You have had a chemical dependency assessment.  You have had blood drawn, and the results have been reviewed with you.  Please take a copy of your laboratory work with you to your next provider appointment.    Symptoms to Report:  If you experience more anxiety, confusion, sleeplessness, deep sadness or thoughts of suicide, notify your treatment team or notify your primary care physician. IF THE SYMPTOMS YOU ARE EXPERIENCING ARE A MEDICAL EMERGENCY, CALL 911 IMMEDIATELY.     Lifestyle Adjustment: Adjust your lifestyle to get enough sleep, relaxation, exercise and excellent nutrition. Continue to develop healthy coping skills to decrease stress and promote a sober living environment. Do not use alcohol, illegal drugs or addictive medications other than what is currently prescribed. AA, NA, and a sponsor are excellent resources for support.     Recommendation:  Outpatient treatment at [facility]. If unable to remain sober please seek residential treatment at [facility].    Primary Provider: Primary Provider:  [facility]    Suboxone Maintenance:  Haroon Rodríguez  [facility]  #[phone]  11/29/18 @ 1 PM    Treatment Follow-Up: [facility] [address] [place] [address]    #[phone] Please follow-up with scheduling admission by contacting the above number.    Resources:  [facility] [phone] Support Group:  AA/NA and Sponsor/support.  Crisis Intervention: 716.489.2134 or 198-001-0486 (TTY: 866.346.3319). Call anytime for help.  National [place] on Mental Illness (www.mn.hakeem.org): 522.823.4686 or 653-755-3361.  Alcoholics Anonymous (www.alcoholics-anonymous.org): Check your phone book for your local chapter.  Suicide Awareness Voices of Education (www.save.org): 236-229-POPQ (8305)  National Suicide Prevention Line (www.mentalPPTVmn.org): 558-170-CNQK (1729)  Mental Health Consumer/Survivor Network of [place] (www.mhcsn.net): 738.646.1767 or 360-125-6195.  Mental Health Association of [place] (www.mentalhealth.org): 292.878.1346 or 640-226-2824  Substance Abuse and Mental Health Services. (www.samhsa.gov)    [place] Recovery Connection ([facility])  [facility] connects people seeking recovery to resources that help foster and sustain long-term recovery.  Whether you are seeking resources for treatment, transportation, housing, job training, education, health care or other pathways to recovery, [facility] is a great place to start. 723.140.7071 www.[place]covery.org    General Medication Instruction: See your medication papers for instructions. Take all medicines as directed.  Make no changes unless your doctor suggests them. Go to all your doctor visits.  Be sure to have all your required lab tests. This way, your medicines may be refilled on time. Do not use any drugs not prescribed by your provider. AA/NA and sponsors are excellent resources for support. Avoid alcohol at all costs!    Please Note:  If you have any questions at anytime after you are discharged please call the [facility], [facility] detoxification [facility] [address] at [phone].  [facility] , Health, Behavioral Intake [phone]. Please take this discharge folder with you to all  your follow up appointments, it contains your lab results, diagnosis, medication list and discharge recommendations.      THANK YOU FOR CHOOSING THE [facility]
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
## 2018-11-24 NOTE — ED NOTES
ED to Behavioral Floor Handoff    SITUATION  Jose Luis Gonzáles is a 48 year old male who speaks English and lives in a home with family members The patient arrived in the ED by private car from home with a complaint of Alcohol Intoxication (1L of alcohol use daily, today only had a couple of glasses) and Addiction Problem (six hours ago took 1 tablet (benzo), Oxycodone taken 5 min prior to triage)  .The patient's current symptoms started/worsened 1 month(s) ago and during this time the symptoms have increased.   In the ED, pt was diagnosed with   Final diagnoses:   Alcohol dependence with unspecified alcohol-induced disorder (H)   Benzodiazepine dependence (H)   Continuous opioid dependence (H)        Initial vitals were: BP: (!) 153/120  Pulse: 130  Temp: 98  F (36.7  C)  Resp: 18  SpO2: 96 %   --------  Is the patient diabetic? No   If yes, last blood glucose? --     If yes, was this treated in the ED? --  --------  Is the patient inebriated (ETOH) Yes or Impaired on other substances? Yes  MSSA done? Yes  Last MSSA score:    Were withdrawal symptoms treated? N/A  Does the patient have a seizure history? Yes. If yes, date of most recent seizure--  --------  Is the patient patient experiencing suicidal ideation? denies current or recent suicidal ideation     Homicidal ideation? denies current or recent homicidal ideation or behaviors.    Self-injurious behavior/urges? denies current or recent self injurious behavior or ideation.  ------  Was pt aggressive in the ED No  Was a code called No  Is the pt now cooperative? Yes  -------  Meds given in ED: Medications - No data to display   Family present during ED course? Yes  Family currently present? Yes    BACKGROUND  Does the patient have a cognitive impairment or developmental disability? No  Allergies:   Allergies   Allergen Reactions     Clonidine Nausea     Contrast Dye Hives     Ibuprofen Swelling      Facial/cheek swelling      Keflex [Cephalexin]      Seroquel  [Quetiapine]      Makes him anxious     Valproic Acid Nausea and Vomiting   .   Social demographics are   Social History     Social History     Marital status: Single     Spouse name: N/A     Number of children: N/A     Years of education: N/A     Social History Main Topics     Smoking status: Current Some Day Smoker     Packs/day: 0.25     Types: Cigarettes     Smokeless tobacco: Never Used      Comment: 3 cigs     Alcohol use Yes      Comment: 1 L fluid daily     Drug use: Yes     Special: Opiates, Benzodiazepines, Marijuana      Comment: daily     Sexual activity: No     Other Topics Concern     None     Social History Narrative        ASSESSMENT  Labs results   Labs Ordered and Resulted from Time of ED Arrival Up to the Time of Departure from the ED   ALCOHOL BREATH TEST POCT - Abnormal; Notable for the following:        Result Value    Alcohol Breath Test 0.221 (*)     All other components within normal limits   DRUG ABUSE SCREEN 6 CHEM DEP URINE ([facility])   MSSA SCORE AND VS      Imaging Studies: No results found for this or any previous visit (from the past 24 hour(s)).   Most recent vital signs BP (!) 153/120  Pulse 130  Temp 98  F (36.7  C) (Oral)  Resp 18  SpO2 96%   Abnormal labs/tests/findings requiring intervention:---   Pain control: pt had none  Nausea control: pt had none    RECOMMENDATION  Are any infection precautions needed (MRSA, VRE, etc.)? No If yes, what infection? --  ---  Does the patient have mobility issues? independently. If yes, what device does the pt use? ---  ---  Is patient on 72 hour hold or commitment? No If on 72 hour hold, have hold and rights been given to patient? N/A  Are admitting orders written if after 10 p.m. ?N/A  Tasks needing to be completed:---     Iqra Sewell   ascom-- [phone] [facility]   [phone] [facility] ED

## 2018-11-24 NOTE — ED NOTES
Pt medications in security>  PT asked if he wanted to destory medications.  Oxycodone in patients name was kept with patient belongings.  Kirksey unidentified bottle which patient claimed as Benzos (with 900 more at home) was confirmed and identified as benzos by pharmacy as Clonazpam (C14/M and C1).  Informed patient that the unlabeled bottle needs to be disposed of by security.  Patient confirmed this was ok with Charge Nurse.

## 2018-11-24 NOTE — IP AVS SNAPSHOT
MRN:[id number]                      After Visit Summary   11/24/2018    Jose Luis Christoph Gonzáles    MRN: [id number]           Thank you!     Thank you for choosing [facility] for your care. Our goal is always to provide you with excellent care.        Patient Information     Date Of Birth          1970        Designated Caregiver       Most Recent Value    Caregiver    Will someone help with your care after discharge? no      About your hospital stay     You were admitted on:  November 24, 2018 You last received care in the:  [facility]    You were discharged on:  November 29, 2018       Who to Call     For medical emergencies, please call [phone].  For non-urgent questions about your medical care, please call your primary care provider or clinic, [phone]          Attending Provider     Provider Specialty    Zach Funez MD Emergency Medicine    [facility]       Primary Care Provider Office Phone # Fax #    Tonie Cui [phone]      Follow-up Appointments     Follow Up ([facility])       Follow up with primary care provider, Tonie Cui, within 1 month of discharge, for hospital follow- up. The following labs/tests are recommended: hepatic panel. You should have your folic acid level checked in 3 months as this was low.     Appointments on [place] and/or [facility] (with [facility] or [facility] provider or service). Call [phone] if you haven't heard regarding these appointments within 7 days of discharge.                  Further instructions from your care team       Behavioral [facility] Discharge Planning and Instructions  THANK YOU FOR CHOOSING THE [facility]  [facility]: [phone]    Summary: You were admitted to and processed through [facility] on November 24, 2018 for detoxification from alcohol and sedative hypnotics.  A medical examination was performed that included lab work.  You have met with a  and opted for outpatient treatment  Please make your recovery a priority, Mr. Gonzáles.     Main Diagnosis:  Alcohol Dependence; Sedative Hypnotic Dependence    Major Treatments, Procedures and Findings:  You were detoxified from alcohol and sedative hypnotics using the appropriate protocol(s). You have had a chemical dependency assessment.  You have had blood drawn, and the results have been reviewed with you.  Please take a copy of your laboratory work with you to your next provider appointment.    Symptoms to Report:  If you experience more anxiety, confusion, sleeplessness, deep sadness or thoughts of suicide, notify your treatment team or notify your primary care physician. IF THE SYMPTOMS YOU ARE EXPERIENCING ARE A MEDICAL EMERGENCY, CALL [phone] IMMEDIATELY.     Lifestyle Adjustment: Adjust your lifestyle to get enough sleep, relaxation, exercise and excellent nutrition. Continue to develop healthy coping skills to decrease stress and promote a sober living environment. Do not use alcohol, illegal drugs or addictive medications other than what is currently prescribed. AA, NA, and a sponsor are excellent resources for support.     Recommendation:  Outpatient treatment at [facility]. If unable to remain sober please seek residential treatment at [facility].    Primary Provider: Primary Provider:  [facility]    Suboxone Maintenance:  Haroon Rodríguez  [facility]  #[phone]  11/29/18 @ 1 PM    Treatment Follow-Up: [place] [address]   #[phone] Please follow-up with scheduling admission by contacting the above number.    Resources:  [facility] [phone] Support Group:  AA/NA and Sponsor/support.  Crisis Intervention: 860.511.1448 or 124-858-1607 (TTY: 673.467.2214). Call anytime for help.  National [place] on Mental Illness (www.[place].hakeem.org):  488.169.6714 or 734-323-5883.  Alcoholics Anonymous (www.alcoholics-anonymous.org): Check your phone book for your local chapter.  Suicide Awareness Voices of Education (www.save.org): 533-988-KNIL (3450)  National Suicide Prevention Line (www.mentalhealthmn.org): 780-943-ATVK (1079)  Mental Health Consumer/Survivor Network of [place] (www.mhcsn.net): 963.946.2604 or 481-157-1562.  Mental Health Association of [place] (www.mentalhealth.org): 283.593.8069 or 294-346-6998  Substance Abuse and Mental Health Services. (www.samhsa.gov)    [place] Recovery Connection ([facility])  [facility] connects people seeking recovery to resources that help foster and sustain long-term recovery.  Whether you are seeking resources for treatment, transportation, housing, job training, education, health care or other pathways to recovery, [facility] is a great place to start. 483.561.8831 www.[facility].org    General Medication Instruction: See your medication papers for instructions. Take all medicines as directed.  Make no changes unless your doctor suggests them. Go to all your doctor visits.  Be sure to have all your required lab tests. This way, your medicines may be refilled on time. Do not use any drugs not prescribed by your provider. AA/NA and sponsors are excellent resources for support. Avoid alcohol at all costs!    Please Note:  If you have any questions at anytime after you are discharged please call the [facility] [place] detoxification [facility] at [phone].  [facility], Behavioral Intake [phone]. Please take this discharge folder with you to all your follow up appointments, it contains your lab results, diagnosis, medication list and discharge recommendations.      TH[facility]OSING THE [facility]    Pending Results     No orders found from 11/22/2018 to 11/25/2018.            Statement of Approval     Ordered          11/29/18 0809  I have reviewed and  "agree with all the recommendations and orders detailed in this document.  EFFECTIVE NOW     Approved and electronically signed by:  Moises Smart MD             Admission Information     Date & Time Provider Department Dept. Phone    11/24/2018 Moises Smart MD [facility] [phone]      Your Vitals Were     Blood Pressure Pulse Temperature Respirations Height Weight    132/93 93 98  F (36.7  C) (Tympanic) 16 1.803 m (5' 11\") 78.5 kg (173 lb)    Pulse Oximetry BMI (Body Mass Index)                93% 24.13 kg/m2          MyChart Information     [facility] gives you secure access to your electronic health record. If you see a primary care provider, you can also send messages to your care team and make appointments. If you have questions, please call your primary care clinic.  If you do not have a primary care provider, please call [phone] and they will assist you.        Care EveryWhere ID     This is your Care EveryWhere ID. This could be used by other organizations to access your [facility] medical records  [id number]        Equal Access to Services     [facility] : Hadii bishop Lopez, wajigneshda vani, qaybmarlena bairdalanahi friedman, jose angel kilgore . So [facility] [phone].    ATENCIÓN: Si habla español, tiene a ceron disposición servicios gratuitos de asistencia lingüística. Llame al [phone].    We comply with applicable federal civil rights laws and [place] laws. We do not discriminate on the basis of race, color, national origin, age, disability, sex, sexual orientation, or gender identity.               Review of your medicines      START taking        Dose / Directions    buprenorphine HCl-naloxone HCl 8-2 MG per film   Commonly known as:  SUBOXONE   Used for:  Continuous opioid dependence (H)        Dose:  1 Film   Place 1 Film under the tongue daily   Quantity:  2 Film   Refills:  0       cyclobenzaprine 10 MG tablet   Commonly " known as:  FLEXERIL   Used for:  Alcohol dependence with unspecified alcohol-induced disorder (H)        Dose:  10 mg   Take 1 tablet (10 mg) by mouth 3 times daily as needed for muscle spasms   Quantity:  30 tablet   Refills:  0       gabapentin 400 MG capsule   Commonly known as:  NEURONTIN   Used for:  Alcohol dependence with unspecified alcohol-induced disorder (H)        Dose:  400 mg   Take 1 capsule (400 mg) by mouth 3 times daily as needed for other (anxiety, discomfort, withdrawal)   Quantity:  90 capsule   Refills:  0       multivitamin w/minerals tablet   Used for:  Alcohol dependence with unspecified alcohol-induced disorder (H)        Dose:  1 tablet   Take 1 tablet by mouth daily   Quantity:  30 each   Refills:  0       PHENobarbital 32.4 MG tablet   Commonly known as:  LUMINAL   Used for:  Benzodiazepine dependence (H)        Dose:  32.4 mg   Take 1 tablet (32.4 mg) by mouth At Bedtime   Quantity:  3 tablet   Refills:  0       vitamin B1 100 MG tablet   Commonly known as:  THIAMINE   Used for:  Alcohol dependence with unspecified alcohol-induced disorder (H)        Dose:  100 mg   Take 1 tablet (100 mg) by mouth daily   Quantity:  30 tablet   Refills:  0         CONTINUE these medicines which have NOT CHANGED        Dose / Directions    finasteride 5 MG tablet   Commonly known as:  PROSCAR   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        Dose:  5 mg   Take 1 tablet (5 mg) by mouth daily   Quantity:  30 tablet   Refills:  5       glycerin 2 g suppository   Commonly known as:  ADULT        Dose:  1 suppository   Place 1 suppository rectally daily as needed for constipation   Refills:  0       naloxone 4 MG/0.1ML nasal spray   Commonly known as:  NARCAN   Used for:  Chronic pain syndrome, Neck pain        Dose:  4 mg   Spray 1 spray (4 mg) into one nostril alternating nostrils as needed for opioid reversal every 2-3 minutes until assistance arrives   Quantity:  0.2 mL   Refills:  1       omeprazole 20 MG EC  tablet   Commonly known as:  priLOSEC OTC   Used for:  Gastroesophageal reflux disease without esophagitis        Dose:  20 mg   Take 1 tablet (20 mg) by mouth daily   Quantity:  90 tablet   Refills:  3         STOP taking     oxyCODONE HCl 20 MG Tabs immediate release tablet   Commonly known as:  ROXICODONE                Where to get your medicines      These medications were sent to [facility] [place], [place] - [address]  [address] [facility] [address], [facility] [address]     Phone:  [phone]     cyclobenzaprine 10 MG tablet    gabapentin 400 MG capsule    multivitamin w/minerals tablet    naloxone 4 MG/0.1ML nasal spray    vitamin B1 100 MG tablet         Some of these will need a paper prescription and others can be bought over the counter. Ask your nurse if you have questions.     Bring a paper prescription for each of these medications     buprenorphine HCl-naloxone HCl 8-2 MG per film    PHENobarbital 32.4 MG tablet                Protect others around you: Learn how to safely use, store and throw away your medicines at www.disposemymeds.org.             Medication List: This is a list of all your medications and when to take them. Check marks below indicate your daily home schedule. Keep this list as a reference.      Medications           Morning Afternoon Evening Bedtime As Needed    buprenorphine HCl-naloxone HCl 8-2 MG per film   Commonly known as:  SUBOXONE   Place 1 Film under the tongue daily                                   cyclobenzaprine 10 MG tablet   Commonly known as:  FLEXERIL   Take 1 tablet (10 mg) by mouth 3 times daily as needed for muscle spasms   Last time this was given:  10 mg on 11/25/2018  8:13 PM                            For muscle spasms       finasteride 5 MG tablet   Commonly known as:  PROSCAR   Take 1 tablet (5 mg) by mouth daily                                   gabapentin 400 MG capsule   Commonly known as:  NEURONTIN   Take 1 capsule (400 mg) by  mouth 3 times daily as needed for other (anxiety, discomfort, withdrawal)   Last time this was given:  400 mg on 11/28/2018  8:45 PM                            As needed for anxiety       glycerin 2 g suppository   Commonly known as:  ADULT   Place 1 suppository rectally daily as needed for constipation                            For constipation       multivitamin w/minerals tablet   Take 1 tablet by mouth daily   Last time this was given:  1 tablet on 11/25/2018  8:13 AM                                   naloxone 4 MG/0.1ML nasal spray   Commonly known as:  NARCAN   Spray 1 spray (4 mg) into one nostril alternating nostrils as needed for opioid reversal every 2-3 minutes until assistance arrives                            For overdose reversal       omeprazole 20 MG EC tablet   Commonly known as:  priLOSEC OTC   Take 1 tablet (20 mg) by mouth daily                                   PHENobarbital 32.4 MG tablet   Commonly known as:  LUMINAL   Take 1 tablet (32.4 mg) by mouth At Bedtime   Last time this was given:  64.8 mg on 11/28/2018  8:45 PM                                   vitamin B1 100 MG tablet   Commonly known as:  THIAMINE   Take 1 tablet (100 mg) by mouth daily   Last time this was given:  100 mg on 11/25/2018  8:13 AM

## 2018-11-25 LAB
ALBUMIN SERPL-MCNC: 3.8 G/DL (ref 3.4–5)
ALP SERPL-CCNC: 88 U/L (ref 40–150)
ALT SERPL W P-5'-P-CCNC: 75 U/L (ref 0–70)
ANION GAP SERPL CALCULATED.3IONS-SCNC: 10 MMOL/L (ref 3–14)
AST SERPL W P-5'-P-CCNC: 58 U/L (ref 0–45)
BASOPHILS # BLD AUTO: 0 10E9/L (ref 0–0.2)
BASOPHILS NFR BLD AUTO: 0.7 %
BILIRUB DIRECT SERPL-MCNC: 0.4 MG/DL (ref 0–0.2)
BILIRUB SERPL-MCNC: 1.4 MG/DL (ref 0.2–1.3)
BUN SERPL-MCNC: 13 MG/DL (ref 7–30)
CALCIUM SERPL-MCNC: 8.9 MG/DL (ref 8.5–10.1)
CHLORIDE SERPL-SCNC: 102 MMOL/L (ref 94–109)
CO2 SERPL-SCNC: 28 MMOL/L (ref 20–32)
CREAT SERPL-MCNC: 0.84 MG/DL (ref 0.66–1.25)
DIFFERENTIAL METHOD BLD: ABNORMAL
EOSINOPHIL # BLD AUTO: 0.1 10E9/L (ref 0–0.7)
EOSINOPHIL NFR BLD AUTO: 2.2 %
ERYTHROCYTE [DISTWIDTH] IN BLOOD BY AUTOMATED COUNT: 13 % (ref 10–15)
FOLATE SERPL-MCNC: 2.5 NG/ML
GFR SERPL CREATININE-BSD FRML MDRD: >90 ML/MIN/1.7M2
GGT SERPL-CCNC: 152 U/L (ref 0–75)
GLUCOSE SERPL-MCNC: 79 MG/DL (ref 70–99)
HCT VFR BLD AUTO: 46.3 % (ref 40–53)
HGB BLD-MCNC: 15.1 G/DL (ref 13.3–17.7)
IMM GRANULOCYTES # BLD: 0 10E9/L (ref 0–0.4)
IMM GRANULOCYTES NFR BLD: 0.2 %
LYMPHOCYTES # BLD AUTO: 2.4 10E9/L (ref 0.8–5.3)
LYMPHOCYTES NFR BLD AUTO: 41.1 %
MCH RBC QN AUTO: 34.2 PG (ref 26.5–33)
MCHC RBC AUTO-ENTMCNC: 32.6 G/DL (ref 31.5–36.5)
MCV RBC AUTO: 105 FL (ref 78–100)
MONOCYTES # BLD AUTO: 0.4 10E9/L (ref 0–1.3)
MONOCYTES NFR BLD AUTO: 6.5 %
NEUTROPHILS # BLD AUTO: 2.9 10E9/L (ref 1.6–8.3)
NEUTROPHILS NFR BLD AUTO: 49.3 %
NRBC # BLD AUTO: 0 10*3/UL
NRBC BLD AUTO-RTO: 0 /100
PLATELET # BLD AUTO: 167 10E9/L (ref 150–450)
POTASSIUM SERPL-SCNC: 3.8 MMOL/L (ref 3.4–5.3)
PROT SERPL-MCNC: 6.8 G/DL (ref 6.8–8.8)
RBC # BLD AUTO: 4.42 10E12/L (ref 4.4–5.9)
SODIUM SERPL-SCNC: 140 MMOL/L (ref 133–144)
TSH SERPL DL<=0.005 MIU/L-ACNC: 1.21 MU/L (ref 0.4–4)
VIT B12 SERPL-MCNC: 629 PG/ML (ref 193–986)
WBC # BLD AUTO: 5.8 10E9/L (ref 4–11)

## 2018-11-25 PROCEDURE — 25000132 ZZH RX MED GY IP 250 OP 250 PS 637: Performed by: PHYSICIAN ASSISTANT

## 2018-11-25 PROCEDURE — 99207 ZZC CDG-HISTORY COMP: MEETS EXP. PROBLEM FOCUSED - DOWN CODED LACK OF HPI: CPT | Performed by: PHYSICIAN ASSISTANT

## 2018-11-25 PROCEDURE — 25000132 ZZH RX MED GY IP 250 OP 250 PS 637: Performed by: PSYCHIATRY & NEUROLOGY

## 2018-11-25 PROCEDURE — 12800012 ZZH R&B CD MH INTERMEDIATE ADULT

## 2018-11-25 PROCEDURE — 82977 ASSAY OF GGT: CPT | Performed by: PSYCHIATRY & NEUROLOGY

## 2018-11-25 PROCEDURE — 82248 BILIRUBIN DIRECT: CPT | Performed by: PSYCHIATRY & NEUROLOGY

## 2018-11-25 PROCEDURE — 99223 1ST HOSP IP/OBS HIGH 75: CPT | Mod: AI | Performed by: PSYCHIATRY & NEUROLOGY

## 2018-11-25 PROCEDURE — 82607 VITAMIN B-12: CPT | Performed by: PSYCHIATRY & NEUROLOGY

## 2018-11-25 PROCEDURE — 99232 SBSQ HOSP IP/OBS MODERATE 35: CPT | Performed by: PHYSICIAN ASSISTANT

## 2018-11-25 PROCEDURE — HZ2ZZZZ DETOXIFICATION SERVICES FOR SUBSTANCE ABUSE TREATMENT: ICD-10-PCS | Performed by: PSYCHIATRY & NEUROLOGY

## 2018-11-25 PROCEDURE — 80053 COMPREHEN METABOLIC PANEL: CPT | Performed by: PSYCHIATRY & NEUROLOGY

## 2018-11-25 PROCEDURE — 84443 ASSAY THYROID STIM HORMONE: CPT | Performed by: PSYCHIATRY & NEUROLOGY

## 2018-11-25 PROCEDURE — 85025 COMPLETE CBC W/AUTO DIFF WBC: CPT | Performed by: PSYCHIATRY & NEUROLOGY

## 2018-11-25 PROCEDURE — 36415 COLL VENOUS BLD VENIPUNCTURE: CPT | Performed by: PSYCHIATRY & NEUROLOGY

## 2018-11-25 PROCEDURE — 82746 ASSAY OF FOLIC ACID SERUM: CPT | Performed by: PSYCHIATRY & NEUROLOGY

## 2018-11-25 RX ORDER — BUPRENORPHINE 2 MG/1
4 TABLET SUBLINGUAL 2 TIMES DAILY
Status: DISCONTINUED | OUTPATIENT
Start: 2018-11-25 | End: 2018-11-29 | Stop reason: HOSPADM

## 2018-11-25 RX ORDER — PHENOBARBITAL 64.8 MG/1
64.8 TABLET ORAL 3 TIMES DAILY
Status: COMPLETED | OUTPATIENT
Start: 2018-11-25 | End: 2018-11-25

## 2018-11-25 RX ORDER — CLONIDINE HYDROCHLORIDE 0.1 MG/1
0.1 TABLET ORAL 2 TIMES DAILY PRN
Status: DISCONTINUED | OUTPATIENT
Start: 2018-11-25 | End: 2018-11-29 | Stop reason: HOSPADM

## 2018-11-25 RX ORDER — NAPROXEN 250 MG/1
250 TABLET ORAL EVERY 12 HOURS PRN
Status: DISCONTINUED | OUTPATIENT
Start: 2018-11-25 | End: 2018-11-29 | Stop reason: HOSPADM

## 2018-11-25 RX ORDER — PHENOBARBITAL 32.4 MG/1
32.4 TABLET ORAL 3 TIMES DAILY
Status: DISCONTINUED | OUTPATIENT
Start: 2018-11-26 | End: 2018-11-27

## 2018-11-25 RX ORDER — CLONIDINE HYDROCHLORIDE 0.1 MG/1
0.1 TABLET ORAL 2 TIMES DAILY PRN
Status: DISCONTINUED | OUTPATIENT
Start: 2018-11-25 | End: 2018-11-25

## 2018-11-25 RX ORDER — QUETIAPINE FUMARATE 50 MG/1
50 TABLET, FILM COATED ORAL AT BEDTIME
Status: COMPLETED | OUTPATIENT
Start: 2018-11-25 | End: 2018-11-25

## 2018-11-25 RX ADMIN — PHENOBARBITAL 64.8 MG: 64.8 TABLET ORAL at 16:27

## 2018-11-25 RX ADMIN — ALUMINUM HYDROXIDE, MAGNESIUM HYDROXIDE, AND DIMETHICONE 30 ML: 400; 400; 40 SUSPENSION ORAL at 17:41

## 2018-11-25 RX ADMIN — NAPROXEN 250 MG: 250 TABLET ORAL at 16:27

## 2018-11-25 RX ADMIN — CYCLOBENZAPRINE HYDROCHLORIDE 10 MG: 10 TABLET, FILM COATED ORAL at 20:13

## 2018-11-25 RX ADMIN — DIAZEPAM 10 MG: 5 TABLET ORAL at 16:27

## 2018-11-25 RX ADMIN — DIAZEPAM 10 MG: 5 TABLET ORAL at 20:14

## 2018-11-25 RX ADMIN — DIAZEPAM 10 MG: 5 TABLET ORAL at 12:16

## 2018-11-25 RX ADMIN — BUPRENORPHINE HCL 4 MG: 2 TABLET SUBLINGUAL at 16:31

## 2018-11-25 RX ADMIN — NICOTINE POLACRILEX 8 MG: 4 GUM, CHEWING ORAL at 16:28

## 2018-11-25 RX ADMIN — GABAPENTIN 400 MG: 400 CAPSULE ORAL at 16:27

## 2018-11-25 RX ADMIN — PHENOBARBITAL 64.8 MG: 64.8 TABLET ORAL at 20:14

## 2018-11-25 RX ADMIN — PHENOBARBITAL 64.8 MG: 64.8 TABLET ORAL at 08:13

## 2018-11-25 RX ADMIN — NAPROXEN 250 MG: 250 TABLET ORAL at 08:13

## 2018-11-25 RX ADMIN — DIAZEPAM 10 MG: 5 TABLET ORAL at 08:13

## 2018-11-25 RX ADMIN — QUETIAPINE FUMARATE 50 MG: 50 TABLET ORAL at 20:13

## 2018-11-25 RX ADMIN — BUPRENORPHINE HCL 4 MG: 2 TABLET SUBLINGUAL at 12:16

## 2018-11-25 RX ADMIN — FOLIC ACID 1 MG: 1 TABLET ORAL at 08:13

## 2018-11-25 RX ADMIN — DIAZEPAM 10 MG: 5 TABLET ORAL at 04:37

## 2018-11-25 RX ADMIN — DIAZEPAM 10 MG: 5 TABLET ORAL at 00:52

## 2018-11-25 RX ADMIN — PANTOPRAZOLE SODIUM 40 MG: 40 TABLET, DELAYED RELEASE ORAL at 08:13

## 2018-11-25 RX ADMIN — MULTIPLE VITAMINS W/ MINERALS TAB 1 TABLET: TAB at 08:13

## 2018-11-25 RX ADMIN — CLONIDINE HYDROCHLORIDE 0.1 MG: 0.1 TABLET ORAL at 20:44

## 2018-11-25 RX ADMIN — Medication 100 MG: at 08:13

## 2018-11-25 ASSESSMENT — ACTIVITIES OF DAILY LIVING (ADL)
DRESS: INDEPENDENT
GROOMING: INDEPENDENT
GROOMING: INDEPENDENT
ORAL_HYGIENE: INDEPENDENT
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
## 2018-11-25 NOTE — PROGRESS NOTES
Writer met with Pt to initiate aftercare planning.  Pt would like to go to treatment near [place] to be [place] his family.  He has no specific facility in  [place].  He reports his last Rule 25 was 20 years ago when he went to [facility] which went by a different name at that time, ([facility]?)  Currently Pt lives with his mother in [place].  He is unemployed.

## 2018-11-26 PROCEDURE — 25000132 ZZH RX MED GY IP 250 OP 250 PS 637: Performed by: PSYCHIATRY & NEUROLOGY

## 2018-11-26 PROCEDURE — 99232 SBSQ HOSP IP/OBS MODERATE 35: CPT | Performed by: PSYCHIATRY & NEUROLOGY

## 2018-11-26 PROCEDURE — 12800012 ZZH R&B CD MH INTERMEDIATE ADULT

## 2018-11-26 RX ADMIN — BUPRENORPHINE HCL 4 MG: 2 TABLET SUBLINGUAL at 20:46

## 2018-11-26 RX ADMIN — PANTOPRAZOLE SODIUM 40 MG: 40 TABLET, DELAYED RELEASE ORAL at 08:10

## 2018-11-26 RX ADMIN — DIAZEPAM 10 MG: 5 TABLET ORAL at 04:18

## 2018-11-26 RX ADMIN — BUPRENORPHINE HCL 4 MG: 2 TABLET SUBLINGUAL at 08:10

## 2018-11-26 RX ADMIN — NICOTINE POLACRILEX 8 MG: 4 GUM, CHEWING ORAL at 13:13

## 2018-11-26 RX ADMIN — GABAPENTIN 400 MG: 400 CAPSULE ORAL at 16:29

## 2018-11-26 RX ADMIN — PHENOBARBITAL 32.4 MG: 32.4 TABLET ORAL at 08:10

## 2018-11-26 RX ADMIN — PHENOBARBITAL 32.4 MG: 32.4 TABLET ORAL at 13:16

## 2018-11-26 RX ADMIN — PHENOBARBITAL 32.4 MG: 32.4 TABLET ORAL at 20:46

## 2018-11-26 ASSESSMENT — ACTIVITIES OF DAILY LIVING (ADL)
GROOMING: INDEPENDENT
ORAL_HYGIENE: INDEPENDENT
DRESS: INDEPENDENT

## 2018-11-26 NOTE — PROGRESS NOTES
Pt approached this writer and reported sudden onset of neck pain, right arm numbness and left leg numbness. Internal medicine notified via page.

## 2018-11-26 NOTE — PROGRESS NOTES
[facility], [place]   Psychiatric Progress Note    Interim history   This is a 48 year old male with opiate use dx severe,alcohol use dx severe,sedative hypnotic use dx severe.Pt seen in rounds. Patient's mood is scared  Energy Level:LOW  Sleep:No concerns, sleeps well through night  Appetite:poor motivation interest low   denied any Suicidal/homicidal ideation/plan intent.  deneid any psychosis  No prior suicde attempts  No access to gun  Pt is in detox  Pt mssa/cows score are monitered  Tolerating meds and has no side effects.              Medications:     Current Facility-Administered Medications   Medication     acetaminophen (TYLENOL) tablet 650 mg     alum & mag hydroxide-simethicone (MYLANTA ES/MAALOX  ES) suspension 30 mL     atenolol (TENORMIN) tablet 50 mg     bisacodyl (DULCOLAX) Suppository 10 mg     buprenorphine (SUBUTEX) sublingual tablet 4 mg     cloNIDine (CATAPRES) tablet 0.1 mg     cyclobenzaprine (FLEXERIL) tablet 10 mg     diazepam (VALIUM) tablet 5-20 mg     folic acid (FOLVITE) tablet 1 mg     gabapentin (NEURONTIN) capsule 400 mg     glycerin (adult) Suppository 1 suppository     hydrOXYzine (ATARAX) tablet 25 mg     magnesium hydroxide (MILK OF MAGNESIA) suspension 30 mL     multivitamin, therapeutic with minerals (THERA-VIT-M) tablet 1 tablet     naproxen (NAPROSYN) tablet 250 mg     nicotine polacrilex (NICORETTE) gum 4-8 mg     pantoprazole (PROTONIX) EC tablet 40 mg     PHENobarbital (LUMINAL) tablet 32.4 mg     thiamine tablet 100 mg     traZODone (DESYREL) tablet 50 mg             Allergies:     Allergies   Allergen Reactions     Clonidine Nausea     Contrast Dye Hives     Ibuprofen Swelling      Facial/cheek swelling      Keflex [Cephalexin]      Seroquel [Quetiapine]      Makes him anxious     Valproic Acid Nausea and Vomiting            Psychiatric Examination:   Blood pressure 115/84, pulse 80, temperature 96.5  F (35.8  C), temperature source Tympanic,  "resp. rate 16, height 1.803 m (5' 11\"), weight 78.5 kg (173 lb), SpO2 93 %.  Weight is 173 lbs 0 oz  Body mass index is 24.13 kg/(m^2).    Appearance:  awake, alert and adequately groomed  Attitude:  cooperative  Eye Contact:  good  Mood:  better  Affect:  appropriate and in normal range and mood congruent  Speech:  clear, coherent rate /rhythm are good  Psychomotor Behavior:  no evidence of tardive dyskinesia, dystonia, or tics and intact station, gait and muscle tone  Throught Process:  logical  Associations:  no loose associations  Thought Content:  no evidence of suicidal ideation or homicidal ideation, no evidence of psychotic thought, no auditory hallucinations present and no visual hallucinations present  Insight:  fair  Judgement:  fair  Oriented to:  time, person, and place  Attention Span and Concentration:  intact  Recent and Remote Memory:  intact  Language fund of knowledge are adequate         Labs:     No results found for: NTBNPI, NTBNP  Lab Results   Component Value Date    WBC 5.8 11/25/2018    HGB 15.1 11/25/2018    HCT 46.3 11/25/2018     (H) 11/25/2018     11/25/2018     Lab Results   Component Value Date    TSH 1.21 11/25/2018          Dx opiate use dx severe,alcohol use dx severe,sedative hypnotic use dx severe.  Plan  Will continue detox off alcohol using mssa on valium  Will continue detox off opiates using subutex   Patient wants suboxone maintenance  Will detox off clonozepam using phenobarbital    Will order pain consult     Laboratory/Imaging: reviewed with patient   Consults: internal medicine consult reviewed  Patient will be treated in therapeutic milieu with appropriate individual and group therapies as described.  PDMP CHECKED     Medical diagnoses to be addressed this admission:    Plan:  #Opiate abuse: For chronic pain as below.  -Management of detox per chem dep  #Chronic neck and back pain: Follows with Pain clinic w/ plans for reducing opiates and tapering. CT " neck from 03/2017 w/o acute abnormality. MRI ordered but not completed. MRI thoracic spine in 08/2006 w/ DDD w/ normal spinal cord. Denies new symptoms or worsening pain at present. He denies wanting any new meds or any changes to meds, would like to hold NSAIDs.  -Management of detox per chem dep  -Continue OP f/u with pain clinic  -Detox off opiates as above  -Hold naproxen for now as patient notes upset stomach, Can continue Naproxen BID if prefers at later date  -PRN tylenol  -OK for PRN flexeril as ordered  -Consider addition of gabapentin scheduled for chronic pain, may also help with mood symptoms, will defer to psych (currently ordered PRN)  #Anxiety: At some point on benzos, now tapered. Management per psych.   -Phenobarb taper per chem dep  -Management per chem dep/psych  #Abnormal LFTs: ALT of 75, AST of 58, T bili 1.4 w/ direct bili of 0.4, GGT of 152, w/ normal alk phos. Hep C and HIV NR in 90/2016. Likely due to alcohol abuse.   -Repeat Tuesday 11/27  -F/U with PCP as OP w/ in 4 weeks for repeat hepatic panel  #Macrocytosis: MCV of 105 w/ normal hgb. B12 and folate pending per psych.   #GERD: Continue Prilosec.   #BPH: Can continue PRN Proscar.       Legal Status: voluntary    Safety Assessment:   Checks:  15 min  Precautions: withdrawal precautions  Pt has not required locked seclusion or restraints in the past 24 hours to maintain safety, please refer to RN documentation for further details.  Discussed with patient many issues of addiction,triggers, relapse, and establishing a solid recovery program.  Able to give informed consent:  YES   Discussed Risks/Benefits/Side Effects/Alternatives: YES    After discussion of the indications, risks, benefits, alternatives and consequences of no treatment, the patient elects to complete detox and do trt.
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
## 2018-11-26 NOTE — TELEPHONE ENCOUNTER
Form faxed.      Hyacinth URBANO)(PETER)           Quality 110: Preventive Care And Screening: Influenza Immunization: Influenza Immunization previously received during influenza season Detail Level: Detailed

## 2018-11-26 NOTE — PLAN OF CARE
Problem: Patient Care Overview  Goal: Team Discussion  Team Plan:   BEHAVIORAL TEAM DISCUSSION    Participants: Dr. Moises Smart MD; Ashly ROMO LPC  Progress: Initial  Continued Stay Criteria/Rationale: Pt was admitted to [facility] for symptoms of withdrawal. Symptoms are being managed and treated by [facility] medical staff.  Medical/Physical: Deferred to medicine.  Precautions:   Behavioral Orders   Procedures     Code 1 - Restrict to Unit     Routine Programming     As clinically indicated     Status 15     Every 15 minutes.     Withdrawal precautions     Plan: Monitor, assess, stabilize. Pt requests referral for outpatient treatment. Case management will assist with assessment and referral.  Rationale for change in precautions or plan: N/A

## 2018-11-26 NOTE — PLAN OF CARE
"Problem: Substance Withdrawal  Goal: Substance Withdrawal  Signs and symptoms of listed problems will be absent or manageable.   SBAR  Jose Luis Christoph Gonzáles is a 48 year old year old male with a chief complaint of Alcohol Intoxication (1L of alcohol use daily, today only had a couple of glasses) and Addiction Problem (six hours ago took 1 tablet (benzo), Oxycodone taken 5 min prior to triage)    S = Situation:   BP, withdrawal  B  = Background:   Pt admitted for opiate, alcohol and benzodiazepine withdrawal.  Pt increasingly hypertensive.  Called for order clarification for clonidine--BP at 2000 180/120--reported, clonidine 0.1 given po.    A  =  Assessment:   Vital Signs: BP (!) 180/120 (BP Location: Right arm)  Pulse 90  Temp 97.2  F (36.2  C) (Oral)  Resp 16  Ht 1.803 m (5' 11\")  Wt 78.5 kg (173 lb)  SpO2 93%  BMI 24.13 kg/m2  Pt reports feeling uncomfortable,  Attempting to eat, drinking fluids, trying to stay out of bed and move around but feels most comfortable lying down at this time.  He complains of neck and back pain.  Medications given as ordered, discussed moving limbs while in and out of bed to maintain some flexibility.  Pt reports that he is trying to stay mobile but is feeling a lot of discomfort and pain.  R =   Request or Recommendation:   Will continue to monitor blood pressure and polysubstance withdrawal, Dr. Walker to see         "

## 2018-11-27 ENCOUNTER — TELEPHONE (OUTPATIENT)
Dept: FAMILY MEDICINE | Facility: CLINIC | Age: 48
End: 2018-11-27

## 2018-11-27 LAB
ALBUMIN SERPL-MCNC: 3.6 G/DL (ref 3.4–5)
ALP SERPL-CCNC: 78 U/L (ref 40–150)
ALT SERPL W P-5'-P-CCNC: 75 U/L (ref 0–70)
AST SERPL W P-5'-P-CCNC: 43 U/L (ref 0–45)
BILIRUB DIRECT SERPL-MCNC: 0.2 MG/DL (ref 0–0.2)
BILIRUB SERPL-MCNC: 0.7 MG/DL (ref 0.2–1.3)
PROT SERPL-MCNC: 6.6 G/DL (ref 6.8–8.8)

## 2018-11-27 PROCEDURE — 80076 HEPATIC FUNCTION PANEL: CPT | Performed by: PHYSICIAN ASSISTANT

## 2018-11-27 PROCEDURE — 25000132 ZZH RX MED GY IP 250 OP 250 PS 637: Performed by: PSYCHIATRY & NEUROLOGY

## 2018-11-27 PROCEDURE — 12800012 ZZH R&B CD MH INTERMEDIATE ADULT

## 2018-11-27 PROCEDURE — 99231 SBSQ HOSP IP/OBS SF/LOW 25: CPT | Performed by: PSYCHIATRY & NEUROLOGY

## 2018-11-27 PROCEDURE — 36415 COLL VENOUS BLD VENIPUNCTURE: CPT | Performed by: PHYSICIAN ASSISTANT

## 2018-11-27 PROCEDURE — 99207 ZZC NON-BILLABLE SERV PER CHARTING: CPT | Performed by: PHYSICIAN ASSISTANT

## 2018-11-27 RX ORDER — BUPRENORPHINE AND NALOXONE 8; 2 MG/1; MG/1
1 FILM, SOLUBLE BUCCAL; SUBLINGUAL DAILY
Qty: 2 FILM | Refills: 0 | Status: SHIPPED | OUTPATIENT
Start: 2018-11-27 | End: 2019-05-24

## 2018-11-27 RX ORDER — PHENOBARBITAL 64.8 MG/1
64.8 TABLET ORAL AT BEDTIME
Status: DISCONTINUED | OUTPATIENT
Start: 2018-11-27 | End: 2018-11-29 | Stop reason: HOSPADM

## 2018-11-27 RX ORDER — CYCLOBENZAPRINE HCL 10 MG
10 TABLET ORAL 3 TIMES DAILY PRN
Qty: 30 TABLET | Refills: 0 | Status: SHIPPED | OUTPATIENT
Start: 2018-11-27 | End: 2018-12-17

## 2018-11-27 RX ORDER — PHENOBARBITAL 32.4 MG/1
32.4 TABLET ORAL AT BEDTIME
Qty: 3 TABLET | Refills: 0 | Status: SHIPPED | OUTPATIENT
Start: 2018-11-27 | End: 2019-04-23

## 2018-11-27 RX ORDER — GABAPENTIN 400 MG/1
400 CAPSULE ORAL 3 TIMES DAILY PRN
Qty: 90 CAPSULE | Refills: 0 | Status: SHIPPED | OUTPATIENT
Start: 2018-11-27 | End: 2019-01-02

## 2018-11-27 RX ORDER — LANOLIN ALCOHOL/MO/W.PET/CERES
100 CREAM (GRAM) TOPICAL DAILY
Qty: 30 TABLET | Refills: 0 | Status: SHIPPED | OUTPATIENT
Start: 2018-11-27 | End: 2019-04-23

## 2018-11-27 RX ORDER — MULTIPLE VITAMINS W/ MINERALS TAB 9MG-400MCG
1 TAB ORAL DAILY
Qty: 30 EACH | Refills: 0 | Status: SHIPPED | OUTPATIENT
Start: 2018-11-28 | End: 2019-04-23

## 2018-11-27 RX ADMIN — PHENOBARBITAL 64.8 MG: 64.8 TABLET ORAL at 20:09

## 2018-11-27 RX ADMIN — BUPRENORPHINE HCL 4 MG: 2 TABLET SUBLINGUAL at 16:44

## 2018-11-27 RX ADMIN — PANTOPRAZOLE SODIUM 40 MG: 40 TABLET, DELAYED RELEASE ORAL at 08:22

## 2018-11-27 RX ADMIN — PHENOBARBITAL 32.4 MG: 32.4 TABLET ORAL at 08:22

## 2018-11-27 RX ADMIN — BUPRENORPHINE HCL 4 MG: 2 TABLET SUBLINGUAL at 08:22

## 2018-11-27 RX ADMIN — NICOTINE 1 PATCH: 7 PATCH TRANSDERMAL at 18:20

## 2018-11-27 ASSESSMENT — ACTIVITIES OF DAILY LIVING (ADL)
ORAL_HYGIENE: INDEPENDENT
DRESS: INDEPENDENT
LAUNDRY: WITH SUPERVISION
GROOMING: INDEPENDENT
DRESS: INDEPENDENT
GROOMING: INDEPENDENT
ORAL_HYGIENE: INDEPENDENT

## 2018-11-27 NOTE — TELEPHONE ENCOUNTER
Pt called to cancel phone visit for today.      Thank you,   Jessica YEBOAH   Central Scheduling  [phone]

## 2018-11-27 NOTE — PROGRESS NOTES
"[facility], [place]   Psychiatric Progress Note    Interim history   This is a 48 year old male with opiate use dx severe,alcohol use dx severe,sedative hypnotic use dx severe.Pt seen in rounds. Patient's mood is scared  Energy Level:better  Sleep:poor   Appetite:worse motivation interest improving    denied any Suicidal/homicidal ideation/plan intent.  deneid any psychosis  No prior suicde attempts  No access to gun  Pt is in detox  Pt mssa/cows score are monitered  Tolerating meds and has no side effects.              Medications:     Current Facility-Administered Medications   Medication     acetaminophen (TYLENOL) tablet 650 mg     alum & mag hydroxide-simethicone (MYLANTA ES/MAALOX  ES) suspension 30 mL     atenolol (TENORMIN) tablet 50 mg     bisacodyl (DULCOLAX) Suppository 10 mg     buprenorphine (SUBUTEX) sublingual tablet 4 mg     cloNIDine (CATAPRES) tablet 0.1 mg     cyclobenzaprine (FLEXERIL) tablet 10 mg     diazepam (VALIUM) tablet 5-20 mg     folic acid (FOLVITE) tablet 1 mg     gabapentin (NEURONTIN) capsule 400 mg     glycerin (adult) Suppository 1 suppository     hydrOXYzine (ATARAX) tablet 25 mg     magnesium hydroxide (MILK OF MAGNESIA) suspension 30 mL     multivitamin, therapeutic with minerals (THERA-VIT-M) tablet 1 tablet     naproxen (NAPROSYN) tablet 250 mg     nicotine polacrilex (NICORETTE) gum 4-8 mg     pantoprazole (PROTONIX) EC tablet 40 mg     PHENobarbital (LUMINAL) tablet 32.4 mg     traZODone (DESYREL) tablet 50 mg             Allergies:     Allergies   Allergen Reactions     Clonidine Nausea     Contrast Dye Hives     Ibuprofen Swelling      Facial/cheek swelling      Keflex [Cephalexin]      Seroquel [Quetiapine]      Makes him anxious     Valproic Acid Nausea and Vomiting            Psychiatric Examination:   Blood pressure (!) 147/98, pulse 68, temperature 97.7  F (36.5  C), temperature source Oral, resp. rate 16, height 1.803 m (5' 11\"), weight " 78.5 kg (173 lb), SpO2 93 %.  Weight is 173 lbs 0 oz  Body mass index is 24.13 kg/(m^2).    Appearance:  awake, alert and adequately groomed  Attitude:  cooperative  Eye Contact:  good  Mood:  better  Affect:  appropriate and in normal range and mood congruent  Speech:  clear, coherent rate /rhythm are good  Psychomotor Behavior:  no evidence of tardive dyskinesia, dystonia, or tics and intact station, gait and muscle tone  Throught Process:  logical  Associations:  no loose associations  Thought Content:  no evidence of suicidal ideation or homicidal ideation, no evidence of psychotic thought, no auditory hallucinations present and no visual hallucinations present  Insight:  fair  Judgement:  fair  Oriented to:  time, person, and place  Attention Span and Concentration:  intact  Recent and Remote Memory:  intact  Language fund of knowledge are adequate         Labs:     No results found for: NTBNPI, NTBNP  Lab Results   Component Value Date    WBC 5.8 11/25/2018    HGB 15.1 11/25/2018    HCT 46.3 11/25/2018     (H) 11/25/2018     11/25/2018     Lab Results   Component Value Date    TSH 1.21 11/25/2018          Dx opiate use dx severe,alcohol use dx severe,sedative hypnotic use dx severe.  Plan  Pt is out detox alcohol detox   Will continue detox off opiates using subutex   Patient wants suboxone maintenance  Will detox off clonozepam using phenobarbital     pain consult called and reports pt can be on suboxne, asked asked to see outpt     Laboratory/Imaging: reviewed with patient   Consults: internal medicine consult reviewed  Patient will be treated in therapeutic milieu with appropriate individual and group therapies as described.  PDMP CHECKED     Medical diagnoses to be addressed this admission:    Plan:  #Opiate abuse: For chronic pain as below.  -Management of detox per chem dep  #Chronic neck and back pain: Follows with Pain clinic w/ plans for reducing opiates and tapering. CT neck from  03/2017 w/o acute abnormality. MRI ordered but not completed. MRI thoracic spine in 08/2006 w/ DDD w/ normal spinal cord. Denies new symptoms or worsening pain at present. He denies wanting any new meds or any changes to meds, would like to hold NSAIDs.  -Management of detox per chem dep  -Continue OP f/u with pain clinic  -Detox off opiates as above  -Hold naproxen for now as patient notes upset stomach, Can continue Naproxen BID if prefers at later date  -PRN tylenol  -OK for PRN flexeril as ordered  -Consider addition of gabapentin scheduled for chronic pain, may also help with mood symptoms, will defer to psych (currently ordered PRN)  #Anxiety: At some point on benzos, now tapered. Management per psych.   -Phenobarb taper per chem dep  -Management per chem dep/psych  #Abnormal LFTs: ALT of 75, AST of 58, T bili 1.4 w/ direct bili of 0.4, GGT of 152, w/ normal alk phos. Hep C and HIV NR in 90/2016. Likely due to alcohol abuse.   -Repeat Tuesday 11/27  -F/U with PCP as OP w/ in 4 weeks for repeat hepatic panel  #Macrocytosis: MCV of 105 w/ normal hgb. B12 and folate pending per psych.   #GERD: Continue Prilosec.   #BPH: Can continue PRN Proscar.       Legal Status: voluntary    Safety Assessment:   Checks:  15 min  Precautions: withdrawal precautions  Pt has not required locked seclusion or restraints in the past 24 hours to maintain safety, please refer to RN documentation for further details.  Discussed with patient many issues of addiction,triggers, relapse, and establishing a solid recovery program.  Able to give informed consent:  YES   Discussed Risks/Benefits/Side Effects/Alternatives: YES    After discussion of the indications, risks, benefits, alternatives and consequences of no treatment, the patient elects to complete detox and do trt.
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
## 2018-11-27 NOTE — PROGRESS NOTES
RN Called to notify that the earlier numbness in the right upper and lower extremity which was relayed to the day time physician is now resolving.  Jessica also does not feel that he needs to see anyone  Also, these problems are not new for him with his history of back injury    RN will call me with symptoms increase      Jamaalj Sylvie CAZARES, [facility]  [facility] Physican (Staff Hospitalist)  [facility]  Pager: [phone]

## 2018-11-27 NOTE — PROGRESS NOTES
Patient has not required any valium for alcohol detox since 11/26 @ 3682. All MSSA scores since that time have been less than 8. Pt is now removed from alcohol detox monitoring status. Await disposition likely to inpatient treatment.
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
## 2018-11-27 NOTE — PROGRESS NOTES
Followed hepatic panel.    Note improvement of T bili & AST, stability of ALT. Folic acid level was low, continue folic acid tablet 1 mg x3 months.    Can f/u with PCP w/ in 4 weeks for repeat hepatic panel and repeat folic acid level in 3 months.    LANIE Pérez    Medicine will sign off. Please notify on call HOLLY if any intercurrent medical issues arise.

## 2018-11-28 PROCEDURE — 25000132 ZZH RX MED GY IP 250 OP 250 PS 637: Performed by: PSYCHIATRY & NEUROLOGY

## 2018-11-28 PROCEDURE — 12800012 ZZH R&B CD MH INTERMEDIATE ADULT

## 2018-11-28 RX ORDER — LOPERAMIDE HCL 2 MG
2 CAPSULE ORAL 4 TIMES DAILY PRN
Status: DISCONTINUED | OUTPATIENT
Start: 2018-11-28 | End: 2018-11-29 | Stop reason: HOSPADM

## 2018-11-28 RX ADMIN — HYDROXYZINE HYDROCHLORIDE 25 MG: 25 TABLET, FILM COATED ORAL at 16:28

## 2018-11-28 RX ADMIN — HYDROXYZINE HYDROCHLORIDE 25 MG: 25 TABLET, FILM COATED ORAL at 00:24

## 2018-11-28 RX ADMIN — NICOTINE 1 PATCH: 7 PATCH TRANSDERMAL at 08:12

## 2018-11-28 RX ADMIN — PANTOPRAZOLE SODIUM 40 MG: 40 TABLET, DELAYED RELEASE ORAL at 08:12

## 2018-11-28 RX ADMIN — CLONIDINE HYDROCHLORIDE 0.1 MG: 0.1 TABLET ORAL at 00:24

## 2018-11-28 RX ADMIN — BUPRENORPHINE HCL 4 MG: 2 TABLET SUBLINGUAL at 16:28

## 2018-11-28 RX ADMIN — ACETAMINOPHEN 650 MG: 325 TABLET, FILM COATED ORAL at 20:45

## 2018-11-28 RX ADMIN — GABAPENTIN 400 MG: 400 CAPSULE ORAL at 20:45

## 2018-11-28 RX ADMIN — PHENOBARBITAL 64.8 MG: 64.8 TABLET ORAL at 20:45

## 2018-11-28 RX ADMIN — BUPRENORPHINE HCL 4 MG: 2 TABLET SUBLINGUAL at 08:12

## 2018-11-28 RX ADMIN — HYDROXYZINE HYDROCHLORIDE 25 MG: 25 TABLET, FILM COATED ORAL at 20:45

## 2018-11-28 NOTE — PROGRESS NOTES
Writer met with pt, completed Rule 25 assessment with referral to outpatient treatment at [facility] in [place]. Pt reports that he will follow-up with them after hospital discharge to discuss admission to their program.   AVS updated with contact information.

## 2018-11-28 NOTE — PLAN OF CARE
Problem: Substance Withdrawal  Goal: Substance Withdrawal  Signs and symptoms of listed problems will be absent or manageable.   Outcome: No Change  COWS 8 & 7    Pt had somatic complaints. This evening pt was concerned about elevated BP this evening and wanted treatment for it.  He insisted the BP readings were not accurate.  Writer provided reassurance and took his BP again.  Clonidine was offered and pt refused. Encouraged fluids.     PRN nicotine patch.

## 2018-11-29 VITALS
TEMPERATURE: 98 F | WEIGHT: 173 LBS | HEIGHT: 71 IN | HEART RATE: 93 BPM | OXYGEN SATURATION: 93 % | DIASTOLIC BLOOD PRESSURE: 93 MMHG | BODY MASS INDEX: 24.22 KG/M2 | SYSTOLIC BLOOD PRESSURE: 132 MMHG | RESPIRATION RATE: 16 BRPM

## 2018-11-29 PROCEDURE — 25000132 ZZH RX MED GY IP 250 OP 250 PS 637: Performed by: PSYCHIATRY & NEUROLOGY

## 2018-11-29 PROCEDURE — 99239 HOSP IP/OBS DSCHRG MGMT >30: CPT | Performed by: PSYCHIATRY & NEUROLOGY

## 2018-11-29 RX ADMIN — HYDROXYZINE HYDROCHLORIDE 25 MG: 25 TABLET, FILM COATED ORAL at 08:20

## 2018-11-29 RX ADMIN — PANTOPRAZOLE SODIUM 40 MG: 40 TABLET, DELAYED RELEASE ORAL at 08:06

## 2018-11-29 RX ADMIN — NICOTINE 1 PATCH: 7 PATCH TRANSDERMAL at 08:12

## 2018-11-29 RX ADMIN — BUPRENORPHINE HCL 4 MG: 2 TABLET SUBLINGUAL at 08:06

## 2018-11-29 ASSESSMENT — ACTIVITIES OF DAILY LIVING (ADL)
ORAL_HYGIENE: INDEPENDENT
GROOMING: INDEPENDENT
DRESS: INDEPENDENT

## 2018-11-29 NOTE — DISCHARGE SUMMARY
Admit Date:     11/24/2018   Discharge Date:     11/29/2018      More than 35 minutes spent on discharge summary, doing the discharge instructions, discharge medications, discharge mental status examination.      DISCHARGE DIAGNOSES:   AXIS I:     1.  Opiate use disorder, severe.   2.  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  Please see detailed admission note by Dr. Walker on 11/25/2018.      HOSPITAL COURSE:  During the hospitalization, the patient had an uneventful upward titration of Suboxone.  He was detoxed off alcohol using MSSA protocol on Valium.  As far as benzos goes, he says that he has not been taking the clonazepam as much as intended.  The patient during his hospitalization was seen by Akilah Gardner PA-C; please review detailed note on 11/25/2018.  The patient had a pain consult; Pain did not see him but did a chart review and recommended the patient is not a candidate for opiates and will benefit from being on Suboxone maintenance.  During the hospitalization, the patient's energy, motivation, sleep and interest improved.  He seems to be hypomanic.  He was asked to consider a mood stabilizer.  He was not interested in doing so.  He was continued on gabapentin.  Lab work shows normal comprehensive metabolic panel, bilirubin is 1.4, AST 58, ALT 75.  CBC is normal.  MCV is 105, MCH 34.2.  The patient endorsed some grandiosity, distractibility, but he is not wanting to take any medications for his mood.  During the hospitalization, the patient's energy, motivation, sleep and interest improved.  He did not have any suicidal or homicidal ideation, plan or intent.  He started the phenobarbital taper, tolerating the taper.      DISCHARGE DISPOSITION:  The patient is going home.      DISCHARGE FOLLOWUP:  With Suboxone provider, HUBERT Rivera today at 1:00, and he will go to [facility] when there is a bed available.  Ideally, I would like for patient to go to inpatient MICD  program, but he is considering it.            DISCHARGE MENTAL STATUS EXAMINATION:  The patient is alert, oriented x3.  Good fund of knowledge.  Good use of language.  Recent and remote memory, language, fund of knowledge are all adequate.  Euthymic mood congruent affect  Speech normal rate/rhythm linear tp no loose asso,The patient does not have any active suicidal or homicidal ideation.  Does not have any auditory or visual hallucination.  Fair insight/judgment At this time, the patient was stable to be discharged.        Pt was not determined to not be a danger to himself or others. At the current time of discharge, the patient does not meet criteria for involuntary hospitalization. On the day of discharge, the patient reports that they do not have suicidal or homicidal ideation and would never hurt themselves or others. Steps taken to minimize risk include: assessing patient s behavior and thought process daily during hospital stay, discharging patient with adequate plan for follow up for mental and physical health and discussing safety plan of returning to the hospital should the patient ever have thoughts of harming themselves or others. Therefore, based on all available evidence including the factors cited above, the patient does not appear to be at imminent risk for self-harm, and is appropriate for outpatient level of care.     Educated about side effects/risk vs benefits /alternative including non treatment.Pt consented to be on medication.     .Total time spent on discharge summary more than 35 min  More than  20 min  planning, coordination of care, medication reconciliation and performance of physical exam on day of discharge.Care was coordinated with unit RN and unit therapist       Jose Luis Gonzáles   [place] Medication Instructions MURPHY:[id number]    Printed on:12/04/18 0767   Medication Information                      buprenorphine HCl-naloxone HCl (SUBOXONE) 8-2 MG per film  Place 1 Film under the  tongue daily             cyclobenzaprine (FLEXERIL) 10 MG tablet  Take 1 tablet (10 mg) by mouth 3 times daily as needed for muscle spasms             finasteride (PROSCAR) 5 MG tablet  Take 1 tablet (5 mg) by mouth daily             gabapentin (NEURONTIN) 400 MG capsule  Take 1 capsule (400 mg) by mouth 3 times daily as needed for other (anxiety, discomfort, withdrawal)             glycerin, adult, 2 g SUPP Suppository  Place 1 suppository rectally daily as needed for constipation             multivitamin w/minerals (THERA-VIT-M) tablet  Take 1 tablet by mouth daily             naloxone (NARCAN) 4 MG/0.1ML nasal spray  Spray 1 spray (4 mg) into one nostril alternating nostrils as needed for opioid reversal every 2-3 minutes until assistance arrives             omeprazole (PRILOSEC OTC) 20 MG tablet  Take 1 tablet (20 mg) by mouth daily             PHENobarbital (LUMINAL) 32.4 MG TABS tablet  Take 1 tablet (32.4 mg) by mouth At Bedtime             vitamin B1 (THIAMINE) 100 MG tablet  Take 1 tablet (100 mg) by mouth daily             Recommendation:  Outpatient treatment at [facility]. If unable to remain sober please seek residential treatment at [facility].     Primary Provider: [facility]     Suboxone Maintenance:  [facility]  #[phone]  18 @ 1 PM     Treatment Follow-Up: [place] [address]   #[phone] Please follow-up with scheduling admission by contacting the above number.        TORIBIO NUÑEZ MD             D: 2018   T: 2018   MT: FLORA      Name:     HOWARD SCHMITZ   MRN:      [id number]        Account:        [id number]   :      1970           Admit Date:     2018                                  Discharge Date: 2018      Document: [id number]

## 2018-11-30 ENCOUNTER — TELEPHONE (OUTPATIENT)
Dept: FAMILY MEDICINE | Facility: CLINIC | Age: 48
End: 2018-11-30

## 2018-11-30 NOTE — TELEPHONE ENCOUNTER
FYi - He would like to discuss personal (refused to explain). I tried assisting with scheduling patient to come in and see Dr. Cui.  Provider's schedule is booked out until second week of Dec. He was offered to see another provider. He hung up.     [id number], MEDICAL ASSISTANT

## 2018-11-30 NOTE — TELEPHONE ENCOUNTER
This writer attempted to contact pt on 11/30/18      Reason for call appt and left detailed message.      If patient calls back:   Schedule Office Visit appointment (same day) at his convenience with PCP, document that pt called and close encounter         Ashley Disla CMA

## 2018-11-30 NOTE — TELEPHONE ENCOUNTER
Reason for Call:  Other call back    Detailed comments: Pt just got discharged from detox and has an immediate concern he would like to address as soon as possible    Phone Number Patient can be reached at: Home number on file [phone] (home)    Best Time: anytime    Can we leave a detailed message on this number? YES    Call taken on 11/30/2018 at 10:36 AM by Ashok Forde

## 2018-12-07 ENCOUNTER — VIRTUAL VISIT (OUTPATIENT)
Dept: FAMILY MEDICINE | Facility: CLINIC | Age: 48
End: 2018-12-07
Payer: COMMERCIAL

## 2018-12-07 DIAGNOSIS — R51.9 INTRACTABLE HEADACHE, UNSPECIFIED CHRONICITY PATTERN, UNSPECIFIED HEADACHE TYPE: ICD-10-CM

## 2018-12-07 DIAGNOSIS — F31.9 BIPOLAR AFFECTIVE DISORDER, REMISSION STATUS UNSPECIFIED (H): ICD-10-CM

## 2018-12-07 DIAGNOSIS — F19.20 CHEMICAL DEPENDENCY (H): Primary | ICD-10-CM

## 2018-12-07 DIAGNOSIS — G89.4 CHRONIC PAIN SYNDROME: ICD-10-CM

## 2018-12-07 PROCEDURE — 99442 ZZC PHYSICIAN TELEPHONE EVALUATION 11-20 MIN: CPT | Performed by: FAMILY MEDICINE

## 2018-12-07 RX ORDER — LAMOTRIGINE 25 MG/1
25 TABLET ORAL DAILY
Qty: 60 TABLET | Refills: 0 | Status: SHIPPED | OUTPATIENT
Start: 2018-12-07 | End: 2019-10-02

## 2018-12-07 NOTE — PROGRESS NOTES
Patient opted to conduct today's return visit via telephone vs an in person visit to the clinic.    I spoke with: Jose Luis Gonzáles    The reason for the telephone visit was: Hospital Follow-Up    Pertinent history and review of systems:   Spoke with patient via phone regarding recent hospitalization.  Full records reviewed with patient and through his epic chart.  We had been working to taper him back off of his opiate medications but it got to a point that things were intolerable.  Unfortunately he started substituting some alcohol and eventually found his way at the hospital where he was detoxed from alcohol and benzodiazepines, and started on a Suboxone program.  Patient is now seeing his Suboxone provider on a regular basis.  He is avoided all other substances.  They made note in the hospital the patient had some manic ideation and behaviors he does admit to me today that he is feeling a bit agitated.  He wanted to discuss medications with me.  He does not think he is depressed at this point but notes excessive amount of social stressors.  Part of the problem is having a biologic child and a non-biologic child with a on and off, somewhat former girlfriend.  She has significant substance abuse issues and is created significant social and financial issues for the patient.  He is still working hard to work through these.  Has a lot of neck pain and headaches -has successfully had Botox injections for this in the past.  He has ibuprofen listed as an allergy and said he had some swelling from this but is never had trouble with over-the-counter Aleve or other similar agents to his knowledge.    ASSESSMENT / PLAN:  (F19.20) Chemical dependency (H)  (primary encounter diagnosis)  Comment: Now on a Suboxone program and we will continue to monitor this along with him  Plan:     (F31.9) Bipolar affective disorder, remission status unspecified (H)  Comment: Discussed mechanism of action of the proposed medication, as well as  potential effects, both good and bad.  Patient expressed understanding and agreed with treatment.   Plan: lamoTRIgine (LAMICTAL) 25 MG tablet            (R51) Intractable headache, unspecified chronicity pattern, unspecified headache type  Comment: Discussed mechanism of action of the proposed medication, as well as potential effects, both good and bad.  Patient expressed understanding and agreed with treatment.   Plan: nabumetone (RELAFEN) 750 MG tablet          I will also provide him the name of some local dermatologist or neurologist to discuss the Botox        Advice/instructions given to patient/guardian including prescriptions, follow up appointment or orders for diagnostic testing: I asked him to contact me next week with progress  JANET Cui M.D.     Phone call contact time    Call Started at: 1121    Call Ended at: 1138

## 2018-12-07 NOTE — MR AVS SNAPSHOT
After Visit Summary   12/7/2018    Jose Luisdavion Gonzáles    MRN: [id number]           Patient Information     Date Of Birth          1970        Visit Information        Provider Department      12/7/2018 1:20 PM Tonie Cui MD [facility]        Today's Diagnoses     Chemical dependency (H)    -  1    Bipolar affective disorder, remission status unspecified (H)        Intractable headache, unspecified chronicity pattern, unspecified headache type        Chronic pain syndrome           Follow-ups after your visit        Follow-up notes from your care team     Return in about 1 week (around 12/14/2018) for Contact me with progress.      Your next 10 appointments already scheduled     Dec 12, 2018  3:20 PM CST   New Visit with Francisco Guillen OD   [facility] ([facility])    [address] [place] [id number] [phone]              Who to contact     If you have questions or need follow up information about today's clinic visit or your schedule please contact [facility] directly at [phone].  Normal or non-critical lab and imaging results will be communicated to you by MyChart, letter or phone within 4 business days after the clinic has received the results. If you do not hear from us within 7 days, please contact the clinic through MyChart or phone. If you have a critical or abnormal lab result, we will notify you by phone as soon as possible.  Submit refill requests through [employer] or call your pharmacy and they will forward the refill request to us. Please allow 3 business days for your refill to be completed.          Additional Information About Your Visit        MyChart Information     [employer] gives you secure access to your electronic health record. If you see a primary care provider, you can also send messages to your care team and make appointments. If you have questions, please call your primary  care clinic.  If you do not have a primary care provider, please call [phone] and they will assist you.        Care EveryWhere ID     This is your Care EveryWhere ID. This could be used by other organizations to access your [facility] medical records  [id number]         Blood Pressure from Last 3 Encounters:   11/29/18 (!) 132/93   10/29/18 140/84   09/05/18 (!) 165/105    Weight from Last 3 Encounters:   11/24/18 78.5 kg (173 lb)   10/29/18 80.7 kg (178 lb)   09/05/18 84.8 kg (187 lb)              Today, you had the following     No orders found for display         Today's Medication Changes          These changes are accurate as of 12/7/18  2:18 PM.  If you have any questions, ask your nurse or doctor.               Start taking these medicines.        Dose/Directions    lamoTRIgine 25 MG tablet   Commonly known as:  LaMICtal   Used for:  Bipolar affective disorder, remission status unspecified (H)   Started by:  Tonie Cui MD        Dose:  25 mg   Take 1 tablet (25 mg) by mouth daily For 2 weeks, then 2 tabs daily   Quantity:  60 tablet   Refills:  0       nabumetone 750 MG tablet   Commonly known as:  RELAFEN   Used for:  Intractable headache, unspecified chronicity pattern, unspecified headache type   Started by:  Tonie Cui MD        Dose:  750 mg   Take 1 tablet (750 mg) by mouth 2 times daily as needed for moderate pain   Quantity:  30 tablet   Refills:  1            Where to get your medicines      These medications were sent to [facility] [address] [place] [address] [address] [address]     Phone:  [phone]     lamoTRIgine 25 MG tablet    nabumetone 750 MG tablet                Primary Care Provider Office Phone # Fax #    Tonie Cui [phone] [address] [facility] [address]        Equal Access to Services     GIANCARLO LIU AH: Rufino Lopez  wajigneshda vani, qaybta kamichelle friedman, jose angel ospina dorianalessia richmondjoe laTroyaadavion ah. So [facility] [phone].    ATENCIÓN: Si del hernandez, tiene a ceron disposición servicios gratuitos de asistencia lingüística. Manuelito al [phone].    We comply with applicable federal civil rights laws and [place] laws. We do not discriminate on the basis of race, color, national origin, age, disability, sex, sexual orientation, or gender identity.            Thank you!     Thank you for choosing [facility]  for your care. Our goal is always to provide you with excellent care. Hearing back from our patients is one way we can continue to improve our services. Please take a few minutes to complete the written survey that you may receive in the mail after your visit with us. Thank you!             Your Updated Medication List - Protect others around you: Learn how to safely use, store and throw away your medicines at www.disposemymeds.org.          This list is accurate as of 12/7/18  2:18 PM.  Always use your most recent med list.                   Brand Name Dispense Instructions for use Diagnosis    buprenorphine HCl-naloxone HCl 8-2 MG per film    SUBOXONE    2 Film    Place 1 Film under the tongue daily    Continuous opioid dependence (H)       cyclobenzaprine 10 MG tablet    FLEXERIL    30 tablet    Take 1 tablet (10 mg) by mouth 3 times daily as needed for muscle spasms    Alcohol dependence with unspecified alcohol-induced disorder (H)       finasteride 5 MG tablet    PROSCAR    30 tablet    Take 1 tablet (5 mg) by mouth daily    Male pattern baldness       gabapentin 400 MG capsule    NEURONTIN    90 capsule    Take 1 capsule (400 mg) by mouth 3 times daily as needed for other (anxiety, discomfort, withdrawal)    Alcohol dependence with unspecified alcohol-induced disorder (H)       glycerin 2 g suppository    ADULT     Place 1 suppository rectally daily as needed for constipation        lamoTRIgine 25 MG  tablet    LaMICtal    60 tablet    Take 1 tablet (25 mg) by mouth daily For 2 weeks, then 2 tabs daily    Bipolar affective disorder, remission status unspecified (H)       multivitamin w/minerals tablet     30 each    Take 1 tablet by mouth daily    Alcohol dependence with unspecified alcohol-induced disorder (H)       nabumetone 750 MG tablet    RELAFEN    30 tablet    Take 1 tablet (750 mg) by mouth 2 times daily as needed for moderate pain    Intractable headache, unspecified chronicity pattern, unspecified headache type       naloxone 4 MG/0.1ML nasal spray    NARCAN    0.2 mL    Spray 1 spray (4 mg) into one nostril alternating nostrils as needed for opioid reversal every 2-3 minutes until assistance arrives    Chronic pain syndrome, Neck pain       omeprazole 20 MG EC tablet    priLOSEC OTC    90 tablet    Take 1 tablet (20 mg) by mouth daily    Gastroesophageal reflux disease without esophagitis       PHENobarbital 32.4 MG tablet    LUMINAL    3 tablet    Take 1 tablet (32.4 mg) by mouth At Bedtime    Benzodiazepine dependence (H)       vitamin B1 100 MG tablet    THIAMINE    30 tablet    Take 1 tablet (100 mg) by mouth daily    Alcohol dependence with unspecified alcohol-induced disorder (H)

## 2018-12-12 ENCOUNTER — OFFICE VISIT (OUTPATIENT)
Dept: OPTOMETRY | Facility: CLINIC | Age: 48
End: 2018-12-12
Payer: COMMERCIAL

## 2018-12-12 DIAGNOSIS — H52.03 HYPEROPIA, BILATERAL: ICD-10-CM

## 2018-12-12 DIAGNOSIS — H40.003 GLAUCOMA SUSPECT, BILATERAL: Primary | ICD-10-CM

## 2018-12-12 DIAGNOSIS — H52.4 PRESBYOPIA: ICD-10-CM

## 2018-12-12 PROCEDURE — 92015 DETERMINE REFRACTIVE STATE: CPT | Performed by: OPTOMETRIST

## 2018-12-12 PROCEDURE — 92014 COMPRE OPH EXAM EST PT 1/>: CPT | Performed by: OPTOMETRIST

## 2018-12-12 ASSESSMENT — VISUAL ACUITY
OD_SC: 20/20
METHOD: SNELLEN - LINEAR
OS_SC+: +1
OS_SC: 20/25
OD_SC: 20/200
OS_SC: 20/400

## 2018-12-12 ASSESSMENT — REFRACTION_MANIFEST
OD_ADD: +1.50
OS_ADD: +1.50
OS_SPHERE: +0.25
OD_AXIS: 020
OD_CYLINDER: +0.75
OD_SPHERE: +0.25

## 2018-12-12 ASSESSMENT — REFRACTION_WEARINGRX
OD_CYLINDER: +0.50
OS_SPHERE: +0.25
OD_ADD: +1.50
OD_SPHERE: -0.25
SPECS_TYPE: LEE 16
OS_ADD: +1.50
OD_AXIS: 10

## 2018-12-12 ASSESSMENT — SLIT LAMP EXAM - LIDS
COMMENTS: NORMAL
COMMENTS: NORMAL

## 2018-12-12 ASSESSMENT — EXTERNAL EXAM - LEFT EYE: OS_EXAM: NORMAL

## 2018-12-12 ASSESSMENT — EXTERNAL EXAM - RIGHT EYE: OD_EXAM: NORMAL

## 2018-12-12 ASSESSMENT — CONF VISUAL FIELD
METHOD: COUNTING FINGERS
OD_NORMAL: 1
OS_NORMAL: 1

## 2018-12-12 ASSESSMENT — CUP TO DISC RATIO
OD_RATIO: 0.7
OS_RATIO: 0.7

## 2018-12-12 ASSESSMENT — TONOMETRY
IOP_METHOD: TONOPEN
OD_IOP_MMHG: 21
OS_IOP_MMHG: 19

## 2018-12-12 NOTE — PROGRESS NOTES
Chief Complaint   Patient presents with     Annual Eye Exam         Last Eye Exam: 10/2016  Dilated Previously: Yes    What are you currently using to see?  Glasses - reading - that do not work - someone gave them to him    Pt says he has a twitch in the left eye.  Pt had Botox 2 days -      Distance Vision Acuity: Satisfied with vision    Near Vision Acuity: Not satisfied     Eye Comfort: red  Do you use eye drops? : Yes: healthy eye  Occupation or Hobbies: manages stores in the past - has had 3 years of heavy drug and alcohol use - Pt will be sober 30 days at [facility]            Medical, surgical and family histories reviewed and updated 12/12/2018.       OBJECTIVE: See Ophthalmology exam    ASSESSMENT:    ICD-10-CM    1. Glaucoma suspect, bilateral H40.003    2. Hyperopia, bilateral H52.03    3. Presbyopia H52.4       PLAN:     Patient Instructions    Continue to monitor glaucoma status with yearly eye exams.  Schedule follow up OCT/Visual Field.    Eyeglass prescription given.  Your options are a bifocal which would allow you to see distance and near vision or separate glasses for distance and reading.    Return in 1 year for a complete eye exam or sooner if needed.    Francisco Guillen, OD

## 2018-12-12 NOTE — PATIENT INSTRUCTIONS
Continue to monitor glaucoma status with yearly eye exams.  Schedule follow up OCT/Visual Field.    Eyeglass prescription given.  Your options are a bifocal which would allow you to see distance and near vision or separate glasses for distance and reading.    Return in 1 year for a complete eye exam or sooner if needed.    Francisco Guillen, CRISTAL    The affects of the dilating drops last for 4- 6 hours.  You will be more sensitive to light and vision will be blurry up close.  Mydriatic sunglasses were given if needed.      Optometry Providers       Clinic Locations                                 Telephone Number   Dr. Aimee Rueda   [place]    [place]   [place]   [place] [phone]     [facility] Hours:                [facility] Hours:       [facility] Hours:   [address]   [address] [facility] [address]     [address] [facility]  [place] [address]   [place] [address]    [place] [address]  Phone: [phone]                    Phone: [phone]     Phone: [phone]                      Monday 8:00-7:00                          Monday 8:00-7:00                          Monday 8:00-7:00              Tuesday 8:00-6:00                          Tuesday 8:00-7:00                          Tuesday 8:00-7:00              Wednesday 8:00-6:00                  Wednesday 8:00-7:00                   Wednesday 8:00-7:00      Thursday 8:00-6:00                        Thursday 8:00-7:00                         Thursday 8:00-7:00            Friday 8:00-5:00                              Friday 8:00-5:00                              Friday 8:00-5:00    [facility] Hours:   [address] [facility] [place] [id number] [phone]    Monday 8:00-7:00  Tuesday 8:00-7:00  Wednesday 8:00-7:00  Thursday 8:00-7:00  Friday 8:00-5:00  Please log on to [email or web address] to order your contact lenses.  The link is found  on the [facility] page.  As always, Thank you for trusting us with your health care needs!

## 2018-12-17 DIAGNOSIS — F10.29 ALCOHOL DEPENDENCE WITH UNSPECIFIED ALCOHOL-INDUCED DISORDER (H): ICD-10-CM

## 2018-12-17 RX ORDER — CYCLOBENZAPRINE HCL 10 MG
10 TABLET ORAL 3 TIMES DAILY PRN
Qty: 30 TABLET | Refills: 0 | Status: SHIPPED | OUTPATIENT
Start: 2018-12-17 | End: 2019-01-02

## 2018-12-17 NOTE — TELEPHONE ENCOUNTER
Future Office visit:    Next 5 appointments (look out 90 days)    Jan 25, 2019  2:40 PM CST  Return Visit with Francisco Guillen OD  [facility] ([facility]) [address] [place] [address] [phone]           Routing refill request to provider for review/approval because:  Drug not on the [facility], [facility] or [facility] refill protocol or controlled substance  Nolvia Murphy RN

## 2018-12-17 NOTE — TELEPHONE ENCOUNTER
Requested Prescriptions   Pending Prescriptions Disp Refills     cyclobenzaprine (FLEXERIL) 10 MG tablet 30 tablet 0     Sig: Take 1 tablet (10 mg) by mouth 3 times daily as needed for muscle spasms    There is no refill protocol information for this order          cyclobenzaprine (FLEXERIL) 10 MG tablet      Last Written Prescription Date:  11/27/18  Last Fill Quantity: 30,   # refills: 0  Last Office Visit: 12/7/18  Future Office visit:    Next 5 appointments (look out 90 days)    Jan 25, 2019  2:40 PM CST  Return Visit with Francisco Guillen OD  [facility] [address] [place] [address] [phone]           Routing refill request to provider for review/approval because:  Drug not on the [facility], [facility] or [facility] refill protocol or controlled substance

## 2018-12-28 DIAGNOSIS — L64.9 MALE PATTERN BALDNESS: ICD-10-CM

## 2018-12-28 DIAGNOSIS — F10.29 ALCOHOL DEPENDENCE WITH UNSPECIFIED ALCOHOL-INDUCED DISORDER (H): ICD-10-CM

## 2018-12-28 NOTE — TELEPHONE ENCOUNTER
Reason for call:  Medication     If this is a refill request, has the caller requested the refill from the pharmacy already? Yes     Will the patient be using a [facility]? No     Name of the pharmacy and phone number for the current request[facility]    Name of the medication requested: gabapentin, flexeril, proscar    Other request: can you call or fax these in - pharmacy told he he needed to contact you    Phone number to reach patient:  Home number on file [phone] (home)    Best Time:  any    Can we leave a detailed message on this number?  YES

## 2018-12-31 NOTE — TELEPHONE ENCOUNTER
"Requested Prescriptions   Pending Prescriptions Disp Refills     cyclobenzaprine (FLEXERIL) 10 MG tablet 30 tablet 0     Sig: Take 1 tablet (10 mg) by mouth 3 times daily as needed for muscle spasms    There is no refill protocol information for this order        finasteride (PROSCAR) 5 MG tablet 30 tablet 5     Sig: Take 1 tablet (5 mg) by mouth daily    Alpha Blockers Failed - 12/28/2018  2:21 PM       Failed - Blood pressure under 140/90 in past 12 months    BP Readings from Last 3 Encounters:   10/29/18 140/84   09/05/18 (!) 165/105   08/20/18 116/80                Passed - Recent (12 mo) or future (30 days) visit within the authorizing provider's specialty    Patient had office visit in the last 12 months or has a visit in the next 30 days with authorizing provider or within the authorizing provider's specialty.  See \"Patient Info\" tab in inbasket, or \"Choose Columns\" in Meds & Orders section of the refill encounter.             Passed - Patient does not have Tadalafil, Vardenafil, or Sildenafil on their medication list       Passed - Patient is 18 years of age or older        gabapentin (NEURONTIN) 400 MG capsule 90 capsule 0     Sig: Take 1 capsule (400 mg) by mouth 3 times daily as needed for other (anxiety, discomfort, withdrawal)    There is no refill protocol information for this order          cyclobenzaprine (FLEXERIL) 10 MG tablet      Last Written Prescription Date:  12/17/18  Last Fill Quantity: 30,   # refills: 0  Last Office Visit: 12/7/18  Future Office visit:    Next 5 appointments (look out 90 days)    Jan 25, 2019  2:40 PM CST  Return Visit with Francisco Guillen OD  [facility] ([facility]) [address] [place] [address] [phone]           Routing refill request to provider for review/approval because:  Drug not on the [facility], [facility] or [facility] refill protocol or controlled substance    finasteride (PROSCAR) 5 MG tablet  Last Written Prescription " Date:  2/26/18  Last Fill Quantity: 30,  # refills: 5   Last office visit: 12/7/2018 with prescribing provider:  Dr. Cui   Future Office Visit:   Next 5 appointments (look out 90 days)    Jan 25, 2019  2:40 PM CST  Return Visit with Francisco Guillen OD  [facility] ([facility]) [address] [place] [id number] [phone]         gabapentin (NEURONTIN) 400 MG capsule      Last Written Prescription Date:  11/27/18  Last Fill Quantity: 90,   # refills: 0  Last Office Visit: 12/7/18  Future Office visit:    Next 5 appointments (look out 90 days)    Jan 25, 2019  2:40 PM CST  Return Visit with Francisco Guillen OD  [facility]) [address] [place] [id number] [phone]           Routing refill request to provider for review/approval because:  Drug not on the [facility], [facility] or [facility] refill protocol or controlled substance

## 2019-01-02 RX ORDER — CYCLOBENZAPRINE HCL 10 MG
10 TABLET ORAL 3 TIMES DAILY PRN
Qty: 30 TABLET | Refills: 0 | Status: SHIPPED | OUTPATIENT
Start: 2019-01-02 | End: 2019-03-15

## 2019-01-02 RX ORDER — GABAPENTIN 400 MG/1
400 CAPSULE ORAL 3 TIMES DAILY
Qty: 90 CAPSULE | Refills: 2 | Status: SHIPPED | OUTPATIENT
Start: 2019-01-02 | End: 2019-05-24 | Stop reason: ALTCHOICE

## 2019-01-02 RX ORDER — FINASTERIDE 5 MG/1
5 TABLET, FILM COATED ORAL DAILY
Qty: 30 TABLET | Refills: 5 | Status: SHIPPED | OUTPATIENT
Start: 2019-01-02 | End: 2020-01-07

## 2019-01-02 NOTE — TELEPHONE ENCOUNTER
Routing refill request to provider for review/approval because:  Drug not on the [facility] refill protocol   BP out of range and information is locked in the vitals review flow sheet.    Cassandra Montalvo RN, [facility]

## 2019-02-14 NOTE — TELEPHONE ENCOUNTER
Reason for Call:  Other prescription    Detailed comments: Pt calling for he has new  information for what his insurance will and will not cover in regards to his medication request  because he just switched over to [employer]    Phone Number Patient can be reached at: Home number on file [phone] (home)    Best Time: Anytime    Can we leave a detailed message on this number? YES    Call taken on 5/23/2017 at 1:05 PM by Ashok Forde             Ulysses, Benny

## 2019-03-15 DIAGNOSIS — F10.29 ALCOHOL DEPENDENCE WITH UNSPECIFIED ALCOHOL-INDUCED DISORDER (H): ICD-10-CM

## 2019-03-18 RX ORDER — CYCLOBENZAPRINE HCL 10 MG
TABLET ORAL
Qty: 30 TABLET | Refills: 0 | Status: SHIPPED | OUTPATIENT
Start: 2019-03-18 | End: 2019-05-04

## 2019-03-18 NOTE — TELEPHONE ENCOUNTER
Requested Prescriptions   Pending Prescriptions Disp Refills     cyclobenzaprine (FLEXERIL) 10 MG tablet [Pharmacy Med Name: CYCLOBENZAPRINE 10MG TABLETS] 30 tablet 0     Sig: TAKE 1 TABLET BY MOUTH THREE TIMES DAILY AS NEEDED FOR MUSCLE SPASMS    There is no refill protocol information for this order          cyclobenzaprine (FLEXERIL) 10 MG tablet      Last Written Prescription Date:  1/2/19  Last Fill Quantity: 30,   # refills: 0  Last Office Visit: 12/7/18  Future Office visit:       Routing refill request to provider for review/approval because:  Drug not on the [facility], [facility] or [facility] refill protocol or controlled substance

## 2019-04-12 ENCOUNTER — TELEPHONE (OUTPATIENT)
Dept: DERMATOLOGY | Facility: CLINIC | Age: 49
End: 2019-04-12

## 2019-04-12 NOTE — TELEPHONE ENCOUNTER
Called and spoke to patient. Informed patient that Dr. Carter does do Botox injections for migraines. Patient stated he is going to call his insurance to see if it's covered. Patient stated he will call back to make an appointment. Patient voiced understanding.    Janet RN-BSN  [facility]  [phone]

## 2019-04-12 NOTE — TELEPHONE ENCOUNTER
Reason for Call:  Other call back    Detailed comments: pt wants to know if any of the derm doctors do botox injections for migraine headaches? Please call pt back with an answer before he schedules appointment. Thanks.    Phone Number Patient can be reached at: Home number on file [phone] (home)    Best Time: anytime    Can we leave a detailed message on this number? YES    Call taken on 4/12/2019 at 1:45 PM by MICA MONROY

## 2019-04-12 NOTE — TELEPHONE ENCOUNTER
Called and LM for patient to call back in regards to Botox question.    Janet RN-BSN  [facility]  [phone]

## 2019-04-23 ENCOUNTER — OFFICE VISIT (OUTPATIENT)
Dept: FAMILY MEDICINE | Facility: CLINIC | Age: 49
End: 2019-04-23
Payer: COMMERCIAL

## 2019-04-23 VITALS
BODY MASS INDEX: 23.48 KG/M2 | OXYGEN SATURATION: 98 % | SYSTOLIC BLOOD PRESSURE: 122 MMHG | DIASTOLIC BLOOD PRESSURE: 84 MMHG | TEMPERATURE: 97.8 F | HEART RATE: 106 BPM | HEIGHT: 70 IN | WEIGHT: 164 LBS

## 2019-04-23 DIAGNOSIS — G89.4 CHRONIC PAIN SYNDROME: Primary | ICD-10-CM

## 2019-04-23 DIAGNOSIS — F31.9 BIPOLAR AFFECTIVE DISORDER, REMISSION STATUS UNSPECIFIED (H): ICD-10-CM

## 2019-04-23 DIAGNOSIS — F19.20 CHEMICAL DEPENDENCY (H): ICD-10-CM

## 2019-04-23 PROCEDURE — 99214 OFFICE O/P EST MOD 30 MIN: CPT | Performed by: FAMILY MEDICINE

## 2019-04-23 RX ORDER — QUETIAPINE FUMARATE 25 MG/1
TABLET, FILM COATED ORAL
Qty: 90 TABLET | Refills: 2 | Status: SHIPPED | OUTPATIENT
Start: 2019-04-23 | End: 2019-05-24

## 2019-04-23 ASSESSMENT — MIFFLIN-ST. JEOR: SCORE: 1615.15
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
## 2019-05-04 DIAGNOSIS — F10.29 ALCOHOL DEPENDENCE WITH UNSPECIFIED ALCOHOL-INDUCED DISORDER (H): ICD-10-CM

## 2019-05-06 RX ORDER — CYCLOBENZAPRINE HCL 10 MG
TABLET ORAL
Qty: 30 TABLET | Refills: 2 | Status: SHIPPED | OUTPATIENT
Start: 2019-05-06 | End: 2019-10-02

## 2019-05-06 NOTE — TELEPHONE ENCOUNTER
Requested Prescriptions   Pending Prescriptions Disp Refills     cyclobenzaprine (FLEXERIL) 10 MG tablet [Pharmacy Med Name: CYCLOBENZAPRINE 10MG TABLETS] 30 tablet 0     Sig: TAKE 1 TABLET BY MOUTH THREE TIMES DAILY AS NEEDED FOR MUSCLE SPASMS       There is no refill protocol information for this order        cyclobenzaprine (FLEXERIL) 10 MG tablet      Last Written Prescription Date:  3/18/19  Last Fill Quantity: 30,   # refills: 0  Last Office Visit: 4/23/19  Future Office visit:       Routing refill request to provider for review/approval because:  Drug not on the [facility], [facility] or [facility] refill protocol or controlled substance

## 2019-05-24 ENCOUNTER — TELEPHONE (OUTPATIENT)
Dept: FAMILY MEDICINE | Facility: CLINIC | Age: 49
End: 2019-05-24

## 2019-05-24 ENCOUNTER — OFFICE VISIT (OUTPATIENT)
Dept: FAMILY MEDICINE | Facility: CLINIC | Age: 49
End: 2019-05-24
Payer: COMMERCIAL

## 2019-05-24 VITALS
RESPIRATION RATE: 19 BRPM | SYSTOLIC BLOOD PRESSURE: 135 MMHG | HEART RATE: 100 BPM | WEIGHT: 164 LBS | HEIGHT: 70 IN | OXYGEN SATURATION: 99 % | TEMPERATURE: 98.5 F | BODY MASS INDEX: 23.48 KG/M2 | DIASTOLIC BLOOD PRESSURE: 88 MMHG

## 2019-05-24 DIAGNOSIS — G89.4 CHRONIC PAIN SYNDROME: ICD-10-CM

## 2019-05-24 DIAGNOSIS — F31.9 BIPOLAR AFFECTIVE DISORDER, REMISSION STATUS UNSPECIFIED (H): Primary | ICD-10-CM

## 2019-05-24 PROCEDURE — 99214 OFFICE O/P EST MOD 30 MIN: CPT | Performed by: FAMILY MEDICINE

## 2019-05-24 RX ORDER — QUETIAPINE FUMARATE 25 MG/1
TABLET, FILM COATED ORAL
Qty: 90 TABLET | Refills: 2 | Status: SHIPPED | OUTPATIENT
Start: 2019-05-24 | End: 2019-10-02

## 2019-05-24 ASSESSMENT — PATIENT HEALTH QUESTIONNAIRE - PHQ9
SUM OF ALL RESPONSES TO PHQ QUESTIONS 1-9: 15
5. POOR APPETITE OR OVEREATING: NEARLY EVERY DAY

## 2019-05-24 ASSESSMENT — ANXIETY QUESTIONNAIRES
6. BECOMING EASILY ANNOYED OR IRRITABLE: NEARLY EVERY DAY
7. FEELING AFRAID AS IF SOMETHING AWFUL MIGHT HAPPEN: NEARLY EVERY DAY
2. NOT BEING ABLE TO STOP OR CONTROL WORRYING: MORE THAN HALF THE DAYS
1. FEELING NERVOUS, ANXIOUS, OR ON EDGE: NEARLY EVERY DAY
5. BEING SO RESTLESS THAT IT IS HARD TO SIT STILL: NEARLY EVERY DAY
GAD7 TOTAL SCORE: 19
IF YOU CHECKED OFF ANY PROBLEMS ON THIS QUESTIONNAIRE, HOW DIFFICULT HAVE THESE PROBLEMS MADE IT FOR YOU TO DO YOUR WORK, TAKE CARE OF THINGS AT HOME, OR GET ALONG WITH OTHER PEOPLE: SOMEWHAT DIFFICULT
3. WORRYING TOO MUCH ABOUT DIFFERENT THINGS: MORE THAN HALF THE DAYS

## 2019-05-24 ASSESSMENT — PAIN SCALES - GENERAL: PAINLEVEL: EXTREME PAIN (8)

## 2019-05-24 ASSESSMENT — MIFFLIN-ST. JEOR: SCORE: 1610.15

## 2019-05-24 NOTE — TELEPHONE ENCOUNTER
Plan does not cover QUEtiapine (SEROQUEL) 25 MG tablet.  Please call [phone] to initiate Prior Auth or change med.    Insurance type and ID number: [id number]      Additional Information:     Hyacinth Ortiz

## 2019-05-24 NOTE — PATIENT INSTRUCTIONS
I think the gabapentin was started related to alcohol withdrawal, so certainly not necessary.    If you are not taking it, do not even worry about it.    If you are taking it 3 times a day simply drop down to twice a day for a few days then once a day for a few days then stop.      I think the Seroquel would be a much better choice for you.  I will go ahead and send in a new prescription

## 2019-05-24 NOTE — PROGRESS NOTES
"Subjective     Jose Luis Christoph Gonzáles is a 49 year old male who presents to clinic today for the following health issues:    HPI   Medication Followup of Flexeril    Taking Medication as prescribed: yes    Side Effects:  None    Medication Helping Symptoms:  yes       Medication Followup of Seroquel    Taking Medication as prescribed: NO    Side Effects:  None    Medication Helping Symptoms:  Yes at night     SUBJECTIVE:  Here today in follow-up of chronic neck pain and bipolar disorder.  When I saw him last time we did plan to start him on some Seroquel to help with sleep and anxiety.  His pharmacy seem to have an issue with the potential interaction with gabapentin, but I do not really know of any other than further CNS depression.  He is only been on gabapentin short-term and it seems to be related to alcohol withdrawal while he was hospitalized.  But there really is no reason for him to continue and he does not really want to.  Has tapered himself off of Suboxone and is doing okay.  Learning to deal with the neck pain without medication specifically for this.  Spending a lot of time with his parents and is hoping to get his life back on track.  Generally feeling well overall.    Review of systems otherwise negative.  Past medical, family, and social history reviewed and updated in chart.    OBJECTIVE:  /88 (BP Location: Right arm, Patient Position: Chair, Cuff Size: Adult Large)   Pulse 100   Temp 98.5  F (36.9  C) (Oral)   Resp 19   Ht 1.778 m (5' 10\")   Wt 74.4 kg (164 lb)   SpO2 99%   BMI 23.53 kg/m    Alert, pleasant, upbeat, and in no apparent discomfort.  S1 and S2 normal, no murmurs, clicks, gallops or rubs. Regular rate and rhythm. Chest is clear; no wheezes or rales. No edema or JVD.  Past labs reviewed with the patient.     ASSESSMENT / PLAN:  (F31.9) Bipolar affective disorder, remission status unspecified (H)  (primary encounter diagnosis)  Comment: I think Seroquel would be a great " medicine to help with sleep and anxiety and I will re-prescribe it and taper him off of the gabapentin.  Plan: QUEtiapine (SEROQUEL) 25 MG tablet        Discussed mechanism of action of the proposed medication, as well as potential effects, both good and bad.  Patient expressed understanding and agreed with treatment.     (G89.4) Chronic pain syndrome  Comment: Off Suboxone now and doing okay.  We will continue to follow  Plan:     Follow up 3 months.  Recommend complete physical  S. Zach Cui MD    (Chart documentation completed in part with Dragon voice-recognition software.  Even though reviewed some grammatical, spelling, and word errors may remain.)

## 2019-05-25 ASSESSMENT — ANXIETY QUESTIONNAIRES: GAD7 TOTAL SCORE: 19

## 2019-05-29 NOTE — TELEPHONE ENCOUNTER
PA Initiation    Medication: QUEtiapine (SEROQUEL) 25 MG tablet  Insurance Company: [employer] - Phone [phone] Fax [phone]  Pharmacy Filling the Rx: [facility] [address] [place] [address] - [facility] [place]  AT [facility]  Filling Pharmacy Phone: [phone]  Filling Pharmacy Fax:    Start Date: 5/29/2019    Central Prior Authorization Team   Phone: [phone]

## 2019-05-30 NOTE — TELEPHONE ENCOUNTER
PRIOR AUTHORIZATION DENIED    Medication: QUEtiapine (SEROQUEL) 25 MG tablet    Denial Date: 5/30/2019    Denial Rational: Denied as the dose can be made with a lower number of a higher strength ex: two 50mg vs four 25mg tablets. Patient can get up to 3 tablets per day        Appeal Information:

## 2019-05-31 NOTE — TELEPHONE ENCOUNTER
Noted - he can maybe try to find out if this is an issue of the diagnosis, or do we need a different strength to be covered - he should call his insurance about this - not us

## 2019-05-31 NOTE — TELEPHONE ENCOUNTER
This writer attempted to contact 1 on 05/31/19      Reason for call PA/insurance and left detailed message.      If patient calls back:   Relay message that patient needs to call insurance to figure out why PA was denied, (read verbatim), document that pt called and close encounter        Venus Marinelli MA

## 2019-06-03 ENCOUNTER — VIRTUAL VISIT (OUTPATIENT)
Dept: FAMILY MEDICINE | Facility: CLINIC | Age: 49
End: 2019-06-03
Payer: COMMERCIAL

## 2019-06-03 DIAGNOSIS — F31.9 BIPOLAR AFFECTIVE DISORDER, REMISSION STATUS UNSPECIFIED (H): Primary | ICD-10-CM

## 2019-06-03 DIAGNOSIS — F41.1 GENERALIZED ANXIETY DISORDER: ICD-10-CM

## 2019-06-03 PROBLEM — F19.20 CHEMICAL DEPENDENCY (H): Status: ACTIVE | Noted: 2018-11-24

## 2019-06-03 PROCEDURE — 99441 ZZC PHYSICIAN TELEPHONE EVALUATION 5-10 MIN: CPT | Performed by: FAMILY MEDICINE

## 2019-06-03 RX ORDER — CLONAZEPAM 1 MG/1
1 TABLET ORAL 2 TIMES DAILY PRN
Qty: 30 TABLET | Refills: 0 | Status: SHIPPED | OUTPATIENT
Start: 2019-06-03 | End: 2019-08-30

## 2019-06-03 NOTE — PROGRESS NOTES
Patient opted to conduct today's return visit via telephone vs an in person visit to the clinic.    I spoke with: Jose Luis Gonzáles    The reason for the telephone visit was: Discuss medications; patient would like clarification on what he is supposed to be doing as far as treatment plans and medication.     Pertinent history and review of systems:   Spoke with patient via phone regarding his anxiety.  Patient is well-known to me and as per previous visits he went through treatment to get off narcotics and was started on Suboxone but weaned himself off that and has been free of narcotics for some time now.  But he is having some issues with bipolar disorder and anxiety.  Does not feel he is manic but feeling very anxious.  He is on Seroquel and this helps a little bit makes him feel weird.  We have discussed not wanting to get back on scheduled clonazepam at his last visit and this was again stressed.  But the patient knows that this is been helpful for times that his anxiety is heightened.  Is going through some custody issues with former girlfriend who is still an active user of drugs.    Assessment:   ASSESSMENT / PLAN:  (F31.9) Bipolar affective disorder, remission status unspecified (H)  (primary encounter diagnosis)  Comment: Long discussion with the patient regarding my feelings on this.  In no way do I feel comfortable having him back on a scheduled dosage of clonazepam without specialty consultation.  It may be that this is an appropriate treatment for him but I want that call to be made at a higher level of care.  I am okay with very short-term use because there is certainly a risk of return to alcohol without any type of support.  The patient agreed to a consultation with our psychiatry team for some medication management advice.  He understands that this is not a referral for care, but simply for advice and I will abide by their suggestions.  Plan: clonazePAM (KLONOPIN) 1 MG tablet, MENTAL         HEALTH  REFERRAL  - Adult; Psychiatry and         Medication Management; Psychiatry; [facility]:         [facility] Psych[facility]ry Service [phone].  Medication management & future         refills will be returned to [facility] PCP upon         completion of evaluation; Alexandr ibrahim...            (F41.1) Generalized anxiety disorder  Comment:   Plan: clonazePAM (KLONOPIN) 1 MG tablet, MENTAL         HEALTH REFERRAL  - Adult; Psychiatry and         Medication Management; Psychiatry; [facility]:         [facility] [phone].  Medication management & future         refills will be returned to [facility] PCP upon         completion of evaluation; Alexandr ibrahim...               Advice/instructions given to patient/guardian including prescriptions, follow up appointment or orders for diagnostic testing: as above     Phone call contact time    Call Started at: 1631    Call Ended at: 1638    S. Zach Cui M.D.

## 2019-08-29 DIAGNOSIS — F41.1 GENERALIZED ANXIETY DISORDER: ICD-10-CM

## 2019-08-29 DIAGNOSIS — F31.9 BIPOLAR AFFECTIVE DISORDER, REMISSION STATUS UNSPECIFIED (H): ICD-10-CM

## 2019-08-30 RX ORDER — CLONAZEPAM 1 MG/1
1 TABLET ORAL 2 TIMES DAILY PRN
Qty: 30 TABLET | Refills: 0 | Status: SHIPPED | OUTPATIENT
Start: 2019-08-30 | End: 2019-10-02

## 2019-08-30 NOTE — TELEPHONE ENCOUNTER
Routing refill request to provider for review/approval because:  Drug not on the [facility] refill protocol         Lian Rodriguez RN, BSN, PHN

## 2019-08-30 NOTE — TELEPHONE ENCOUNTER
clonazepam rx has been faxed to [id number] - [place], [place] (R))

## 2019-10-02 ENCOUNTER — ANCILLARY PROCEDURE (OUTPATIENT)
Dept: GENERAL RADIOLOGY | Facility: CLINIC | Age: 49
End: 2019-10-02
Attending: FAMILY MEDICINE
Payer: COMMERCIAL

## 2019-10-02 ENCOUNTER — OFFICE VISIT (OUTPATIENT)
Dept: FAMILY MEDICINE | Facility: CLINIC | Age: 49
End: 2019-10-02
Payer: COMMERCIAL

## 2019-10-02 VITALS
DIASTOLIC BLOOD PRESSURE: 92 MMHG | HEART RATE: 121 BPM | HEIGHT: 70 IN | BODY MASS INDEX: 20.62 KG/M2 | SYSTOLIC BLOOD PRESSURE: 163 MMHG | OXYGEN SATURATION: 98 % | WEIGHT: 144 LBS | TEMPERATURE: 97.7 F

## 2019-10-02 DIAGNOSIS — S90.851A FOREIGN BODY IN RIGHT FOOT, INITIAL ENCOUNTER: ICD-10-CM

## 2019-10-02 DIAGNOSIS — F31.9 BIPOLAR AFFECTIVE DISORDER, REMISSION STATUS UNSPECIFIED (H): ICD-10-CM

## 2019-10-02 DIAGNOSIS — F41.1 GENERALIZED ANXIETY DISORDER: ICD-10-CM

## 2019-10-02 DIAGNOSIS — S59.901A ELBOW INJURY, RIGHT, INITIAL ENCOUNTER: ICD-10-CM

## 2019-10-02 DIAGNOSIS — I10 HYPERTENSION GOAL BP (BLOOD PRESSURE) < 140/90: ICD-10-CM

## 2019-10-02 DIAGNOSIS — S90.851A FOREIGN BODY IN RIGHT FOOT, INITIAL ENCOUNTER: Primary | ICD-10-CM

## 2019-10-02 PROCEDURE — 28190 REMOVAL OF FOOT FOREIGN BODY: CPT | Performed by: FAMILY MEDICINE

## 2019-10-02 PROCEDURE — 73080 X-RAY EXAM OF ELBOW: CPT | Mod: RT

## 2019-10-02 PROCEDURE — 99214 OFFICE O/P EST MOD 30 MIN: CPT | Mod: 25 | Performed by: FAMILY MEDICINE

## 2019-10-02 PROCEDURE — 73630 X-RAY EXAM OF FOOT: CPT | Mod: RT

## 2019-10-02 RX ORDER — FINASTERIDE 1 MG/1
1 TABLET, FILM COATED ORAL DAILY
COMMUNITY
End: 2020-03-20

## 2019-10-02 RX ORDER — CLONAZEPAM 1 MG/1
1 TABLET ORAL 2 TIMES DAILY PRN
Qty: 60 TABLET | Refills: 0 | Status: SHIPPED | OUTPATIENT
Start: 2019-10-02 | End: 2019-12-03

## 2019-10-02 ASSESSMENT — MIFFLIN-ST. JEOR: SCORE: 1519.43

## 2019-10-02 NOTE — PROGRESS NOTES
Subjective     Jose Luisdavion Gonzáles is a 49 year old male who presents to clinic today for the following health issues:    HPI   Fall follow-up    Onset: Couple weeks      Description:   Location: right elbow due to fall and right heal has unknown object embedded in it(possible glass per patient)  Character: Sharp with movement     Intensity: moderate    Progression of Symptoms: same    Accompanying Signs & Symptoms:  Other symptoms: Deep bruising after 2 weeks but no longer     Precipitating factors:   Trauma or overuse: YES- fall  Therapies Tried and outcome: None     SUBJECTIVE:  Here today with some right elbow pain related to a fall a couple of weeks ago directly onto his right elbow.  Was playing Frisbee with his daughter and some friends when he fell directly on the elbow but is bruised for a bit but that is healed up.  Range of motion is okay but is painful directly over his right elbow and is wondering if he did something to it.  Also stepped on something that he thinks might be glass a couple of days ago.  It set the back of his right heel and he cannot really get to it to get it out but it is very painful every time he takes a step.    Well-known to me for his history of anxiety and bipolar disorder as well as previous narcotics addiction.  Is doing well from the latter but still struggling with quite a bit of anxiety, made worse by his ex-girlfriend a mother of 1 of his children who is struggling with her own drug issues.  This puts a lot of mental and financial stress on the patient.  Asked about possibly seeing a psychiatrist to discuss what type of medical therapy would be beneficial to him given his addiction history.    Discussed blood pressure being elevated today.  Much better last time I saw him but he is in more pain and a bit more agitated today.    Review of systems otherwise negative.  Past medical, family, and social history reviewed and updated in chart.    OBJECTIVE:  BP (!) 163/92 (BP  "Location: Right arm, Patient Position: Chair, Cuff Size: Adult Regular)   Pulse 121   Temp 97.7  F (36.5  C) (Oral)   Ht 1.778 m (5' 10\")   Wt 65.3 kg (144 lb)   SpO2 98%   BMI 20.66 kg/m    Alert and pleasant, obviously stressed  Well-nourished and well-groomed  S1 and S2 normal, no murmurs, clicks, gallops or rubs. Regular rate and rhythm. Chest is clear; no wheezes or rales. No edema or JVD.   Right elbow with full active range of motion but tender directly over the olecranon  There is a portal of entry the back of his right heel but I cannot see anything on the surface  Past labs reviewed with the patient.     XRAY - my independent reading of the films showed normal right elbow but what appears to be an opaque foreign body at the edge of his right heel, corresponding to the point of entry    We discussed options and it was not deep enough to need anesthesia but using a #15 scalpel and a forceps without teeth I was able to remove a curved 3 mm fragment of glass that had embedded into his heel    ASSESSMENT / PLAN:  (I09.782J) Foreign body in right foot, initial encounter  (primary encounter diagnosis)  Comment: Glass.  Status post removal.  Aftercare discussed  Plan: XR Foot Right G/E 3 Views, REMOVAL FOREIGN BODY        FOOT, SUBCUTANEOUS            (N93.035U) Elbow injury, right, initial encounter  Comment: I think this is more of an elbow bruise and discussed expected course of healing  Plan: XR Elbow Right G/E 3 Views            (F41.1) Generalized anxiety disorder  Comment:   Plan: MENTAL HEALTH REFERRAL  - Adult; Psychiatry and        Medication Management; Psychiatry; [facility]:         [facility] Psychiatry Service [phone].  Medication management & future         refills will be returned to G PCP upon         completion of evaluation; We patrice..., clonazePAM        (KLONOPIN) 1 MG tablet            (F31.9) Bipolar affective disorder, remission status unspecified (H)  Comment: "   Plan: MENTAL HEALTH REFERRAL  - Adult; Psychiatry and        Medication Management; Psychiatry; [facility]:         [facility] Psychiatry Service [phone].  Medication management & future         refills will be returned to G PCP upon         completion of evaluation; We patrice..., clonazePAM        (KLONOPIN) 1 MG tablet            (I10) Hypertension goal BP (blood pressure) < 140/90  Comment: We will continue to follow this  Plan:     Follow up 3 months  SJeremi Cui MD    (Chart documentation completed in part with Dragon voice-recognition software.  Even though reviewed some grammatical, spelling, and word errors may remain.)

## 2019-11-13 ENCOUNTER — TELEPHONE (OUTPATIENT)
Dept: OPTOMETRY | Facility: CLINIC | Age: 49
End: 2019-11-13

## 2019-11-13 NOTE — TELEPHONE ENCOUNTER
11/13 Instructed patient to call [phone] to schedule yearly eye exam around 12/12/2019).    Leila Weinberg   Procedure    Ortho/Sports Med/Ent/Eye   [facility]   [phone]

## 2019-11-15 ENCOUNTER — TELEPHONE (OUTPATIENT)
Dept: OPTOMETRY | Facility: CLINIC | Age: 49
End: 2019-11-15

## 2019-11-15 NOTE — TELEPHONE ENCOUNTER
11/15 Instructed patient to call [phone] to schedule a follow up (around 12/12/2019) for OCT/VF.    Leila Weinberg   Procedure    Ortho/Sports Med/Ent/Eye   [facility]   [phone]

## 2019-11-21 ENCOUNTER — TELEPHONE (OUTPATIENT)
Dept: OPTOMETRY | Facility: CLINIC | Age: 49
End: 2019-11-21

## 2019-11-21 NOTE — TELEPHONE ENCOUNTER
11/21 3rd attempt, provided patient with phone number [phone] to schedule yearly eye exam around  12/12//19.    Leila Weinberg   Procedure    Ortho/Sports Med/Ent/Eye   [facility]   [phone]

## 2019-12-02 DIAGNOSIS — F41.1 GENERALIZED ANXIETY DISORDER: ICD-10-CM

## 2019-12-02 DIAGNOSIS — F31.9 BIPOLAR AFFECTIVE DISORDER, REMISSION STATUS UNSPECIFIED (H): ICD-10-CM

## 2019-12-03 RX ORDER — CLONAZEPAM 1 MG/1
1 TABLET ORAL 2 TIMES DAILY PRN
Qty: 30 TABLET | Refills: 0 | Status: SHIPPED | OUTPATIENT
Start: 2019-12-03 | End: 2020-01-07

## 2020-01-06 DIAGNOSIS — F41.1 GENERALIZED ANXIETY DISORDER: ICD-10-CM

## 2020-01-06 DIAGNOSIS — F31.9 BIPOLAR AFFECTIVE DISORDER, REMISSION STATUS UNSPECIFIED (H): ICD-10-CM

## 2020-01-06 DIAGNOSIS — L64.9 MALE PATTERN BALDNESS: ICD-10-CM

## 2020-01-06 NOTE — TELEPHONE ENCOUNTER
Requested Prescriptions   Pending Prescriptions Disp Refills     finasteride (PROSCAR) 5 MG tablet [Pharmacy Med Name: FINASTERIDE 5MG TABLETS] 30 tablet 5     Sig: TAKE 1 TABLET BY MOUTH DAILY       There is no refill protocol information for this order        finasteride (PROSCAR) 5 MG tablet  Last Written Prescription Date:  1/2/19  Last Fill Quantity: 30,  # refills: 5   Last office visit: 10/2/2019 with prescribing provider:  Dr. Cui   Future Office Visit:

## 2020-01-07 RX ORDER — FINASTERIDE 5 MG/1
TABLET, FILM COATED ORAL
Qty: 30 TABLET | Refills: 5 | Status: SHIPPED | OUTPATIENT
Start: 2020-01-07 | End: 2020-12-09

## 2020-01-07 RX ORDER — CLONAZEPAM 1 MG/1
1 TABLET ORAL 2 TIMES DAILY PRN
Qty: 30 TABLET | Refills: 0 | Status: SHIPPED | OUTPATIENT
Start: 2020-01-07 | End: 2020-01-31

## 2020-01-07 NOTE — TELEPHONE ENCOUNTER
Requested Prescriptions   Pending Prescriptions Disp Refills     clonazePAM (KLONOPIN) 1 MG tablet 30 tablet 0     Sig: Take 1 tablet (1 mg) by mouth 2 times daily as needed for agitation or anxiety       There is no refill protocol information for this order          clonazePAM (KLONOPIN) 1 MG tablet      Last Written Prescription Date:  12/3/19  Last Fill Quantity: 30,   # refills: 0  Last Office Visit: 10/2/19  Future Office visit:       Routing refill request to provider for review/approval because:  Drug not on the [facility], [facility] or [facility] refill protocol or controlled substance

## 2020-01-07 NOTE — TELEPHONE ENCOUNTER
Routing refill request to provider for review/approval because:  Drug not on the [facility] refill protocol   A break in medication      Lian Rodriguez RN, BSN, PHN

## 2020-01-28 ENCOUNTER — TELEPHONE (OUTPATIENT)
Dept: FAMILY MEDICINE | Facility: CLINIC | Age: 50
End: 2020-01-28

## 2020-01-28 DIAGNOSIS — F31.9 BIPOLAR AFFECTIVE DISORDER, REMISSION STATUS UNSPECIFIED (H): ICD-10-CM

## 2020-01-28 DIAGNOSIS — F41.1 GENERALIZED ANXIETY DISORDER: ICD-10-CM

## 2020-01-28 NOTE — TELEPHONE ENCOUNTER
Reason for Call:  Other prescription    Detailed comments: Pt states that the medication below was written as take 1 tablet 2x daily, but the quantity was only for 30. He is hoping that this can get changed as the pharmacy won't fill it as is with the directions. Thank you.    clonazePAM (KLONOPIN) 1 MG tablet    [id number] - [place], [place] - [address] [facility]  [phone]    Phone Number Patient can be reached at: Home number on file [phone] (home)    Best Time: Any    Can we leave a detailed message on this number? YES    Call taken on 1/28/2020 at 1:40 PM by Rupinder Mcintyre

## 2020-01-29 NOTE — TELEPHONE ENCOUNTER
Well, it is actually prescribed twice daily as needed   I do not want him on this scheduled every day.  That is the reason for limiting the amount.  Does he need a refill?

## 2020-01-30 NOTE — TELEPHONE ENCOUNTER
This writer attempted to contact Jose Luis on 01/29/20      Reason for call Medication questions and left detailed message.      If patient calls back:   Please ask patient if he needs refill on this medication        Breana Foote

## 2020-01-31 RX ORDER — CLONAZEPAM 1 MG/1
1 TABLET ORAL 2 TIMES DAILY PRN
Qty: 30 TABLET | Refills: 0 | Status: SHIPPED | OUTPATIENT
Start: 2020-01-31 | End: 2020-02-19

## 2020-02-18 DIAGNOSIS — F31.9 BIPOLAR AFFECTIVE DISORDER, REMISSION STATUS UNSPECIFIED (H): ICD-10-CM

## 2020-02-18 DIAGNOSIS — F41.1 GENERALIZED ANXIETY DISORDER: ICD-10-CM

## 2020-02-18 NOTE — TELEPHONE ENCOUNTER
Requested Prescriptions   Pending Prescriptions Disp Refills     clonazePAM 1 MG PO tablet        Last Written Prescription Date:  1/31/20  Last Fill Quantity: 30 tablet,   # refills: 0  Last Office Visit: Dr. Cui  Future Office visit:       Routing refill request to provider for review/approval because:  Drug not on the [facility], [facility] or [facility] refill protocol or controlled substance   30 tablet 0     Sig: Take 1 tablet (1 mg) by mouth 2 times daily as needed for agitation or anxiety       There is no refill protocol information for this order

## 2020-02-19 RX ORDER — CLONAZEPAM 1 MG/1
1 TABLET ORAL 2 TIMES DAILY PRN
Qty: 30 TABLET | Refills: 0 | Status: SHIPPED | OUTPATIENT
Start: 2020-02-19 | End: 2020-03-20

## 2020-03-18 DIAGNOSIS — F41.1 GENERALIZED ANXIETY DISORDER: ICD-10-CM

## 2020-03-18 DIAGNOSIS — F31.9 BIPOLAR AFFECTIVE DISORDER, REMISSION STATUS UNSPECIFIED (H): ICD-10-CM

## 2020-03-18 NOTE — TELEPHONE ENCOUNTER
Requested Prescriptions   Pending Prescriptions Disp Refills     clonazePAM (KLONOPIN) 1 MG tablet [Pharmacy Med Name: CLONAZEPAM 1MG TABLETS] 30 tablet      Sig: TAKE 1 TABLET BY MOUTH TWICE DAILY AS NEEDED FOR ANXIETY OR AGITATION       There is no refill protocol information for this order          clonazePAM (KLONOPIN) 1 MG tablet      Last Written Prescription Date:  2/19/2020  Last Fill Quantity: 30,   # refills: 0  Last Office Visit: 10/2/19  Future Office visit:    Next 5 appointments (look out 90 days)    Mar 20, 2020  1:00 PM CDT  Telephone Visit with Tonie Cui MD  [facility] [address] [place] [id number] [phone]           Routing refill request to provider for review/approval because:  Drug not on the [facility], [facility] or [facility] refill protocol or controlled substance

## 2020-03-19 NOTE — TELEPHONE ENCOUNTER
Routing refill request to provider for review/approval because:  Drug not on the [facility] refill protocol.    Cassandra Montalvo RN, [facility] Triage

## 2020-03-20 ENCOUNTER — VIRTUAL VISIT (OUTPATIENT)
Dept: FAMILY MEDICINE | Facility: CLINIC | Age: 50
End: 2020-03-20
Payer: COMMERCIAL

## 2020-03-20 DIAGNOSIS — F41.1 GENERALIZED ANXIETY DISORDER: Primary | ICD-10-CM

## 2020-03-20 DIAGNOSIS — F19.20 CHEMICAL DEPENDENCY (H): ICD-10-CM

## 2020-03-20 DIAGNOSIS — F31.9 BIPOLAR AFFECTIVE DISORDER, REMISSION STATUS UNSPECIFIED (H): ICD-10-CM

## 2020-03-20 PROCEDURE — 99441 ZZC PHYSICIAN TELEPHONE EVALUATION 5-10 MIN: CPT | Performed by: FAMILY MEDICINE

## 2020-03-20 RX ORDER — CLONAZEPAM 1 MG/1
TABLET ORAL
Qty: 30 TABLET | OUTPATIENT
Start: 2020-03-20

## 2020-03-20 RX ORDER — CLONAZEPAM 1 MG/1
1 TABLET ORAL 2 TIMES DAILY PRN
Qty: 30 TABLET | Refills: 2 | Status: SHIPPED | OUTPATIENT
Start: 2020-03-20 | End: 2020-05-12

## 2020-03-20 NOTE — PROGRESS NOTES
"Jose Luis Gonzáles is a 49 year old male who is being evaluated via a billable telephone visit.      The patient has been notified of following:     \"This telephone visit will be conducted via a call between you and your physician/provider. We have found that certain health care needs can be provided without the need for a physical exam.  This service lets us provide the care you need with a short phone conversation.  If a prescription is necessary we can send it directly to your pharmacy.  If lab work is needed we can place an order for that and you can then stop by our lab to have the test done at a later time.    If during the course of the call the physician/provider feels a telephone visit is not appropriate, you will not be charged for this service.\"     Jose Luis Gonzáles complains of    Chief Complaint   Patient presents with     Recheck Medication       I have reviewed and updated the patient's Past Medical History, Social History, Family History and Medication List.    ALLERGIES  Clonidine; Contrast dye; Ibuprofen; Keflex [cephalexin]; Seroquel [quetiapine]; and Valproic acid      Additional provider notes:   Spoke with patient via phone in follow-up of anxiety.  Patient well-known to me including his history of former chemical dependency and bipolar disorder.  Still struggles with anxiety quite a bit which tends to be more of the situational variety.  Does not feel that he needs something on a daily basis and we have to be very careful with making sure not to push him more toward manic.  Long before any antidepressant would be used I would suggest a mood stabilizer his baseline.  But he intermittently uses clonazepam and I think has been quite responsible with this after having gone through treatment.  Measured with the  database and previous prescriptions.    Assessment/Plan:  1. Generalized anxiety disorder  Stable and following.  We will keep a close eye on this.  - clonazePAM (KLONOPIN) 1 MG " tablet; Take 1 tablet (1 mg) by mouth 2 times daily as needed for agitation or anxiety  Dispense: 30 tablet; Refill: 2    2. Bipolar affective disorder, remission status unspecified (H)    - clonazePAM (KLONOPIN) 1 MG tablet; Take 1 tablet (1 mg) by mouth 2 times daily as needed for agitation or anxiety  Dispense: 30 tablet; Refill: 2    3. Chemical dependency (H)        Phone call duration:  8 minutes    Tonie Cui MD

## 2020-05-11 DIAGNOSIS — F41.1 GENERALIZED ANXIETY DISORDER: ICD-10-CM

## 2020-05-11 DIAGNOSIS — F31.9 BIPOLAR AFFECTIVE DISORDER, REMISSION STATUS UNSPECIFIED (H): ICD-10-CM

## 2020-05-12 RX ORDER — CLONAZEPAM 1 MG/1
TABLET ORAL
Qty: 30 TABLET | Refills: 0 | Status: SHIPPED | OUTPATIENT
Start: 2020-05-12 | End: 2020-06-01

## 2020-05-12 NOTE — TELEPHONE ENCOUNTER
Requested Prescriptions   Pending Prescriptions Disp Refills     clonazePAM (KLONOPIN) 1 MG tablet [Pharmacy Med Name: CLONAZEPAM 1MG TABLETS] 30 tablet      Sig: TAKE 1 TABLET BY MOUTH TWICE DAILY AS NEEDED FOR AGITATION OR ANXIETY       There is no refill protocol information for this order        Routing refill request to provider for review/approval because:  Drug not on the [facility] refill protocol     Lian Rodriguez RN, BSN, PHN

## 2020-06-01 DIAGNOSIS — F41.1 GENERALIZED ANXIETY DISORDER: ICD-10-CM

## 2020-06-01 DIAGNOSIS — F31.9 BIPOLAR AFFECTIVE DISORDER, REMISSION STATUS UNSPECIFIED (H): ICD-10-CM

## 2020-06-01 RX ORDER — CLONAZEPAM 1 MG/1
TABLET ORAL
Qty: 30 TABLET | Refills: 0 | Status: SHIPPED | OUTPATIENT
Start: 2020-06-01 | End: 2020-06-21

## 2020-06-01 NOTE — TELEPHONE ENCOUNTER
Requested Prescriptions   Pending Prescriptions Disp Refills     clonazePAM (KLONOPIN) 1 MG tablet [Pharmacy Med Name: CLONAZEPAM 1MG TABLETS] 30 tablet      Sig: TAKE 1 TABLET BY MOUTH TWICE DAILY AS NEEDED       There is no refill protocol information for this order        Routing refill request to provider for review/approval because:  Drug not on the [facility] ref[facility]     Lian Rodriguez RN, BSN, PHN

## 2020-06-03 ENCOUNTER — VIRTUAL VISIT (OUTPATIENT)
Dept: FAMILY MEDICINE | Facility: CLINIC | Age: 50
End: 2020-06-03
Payer: MEDICAID

## 2020-06-03 DIAGNOSIS — K04.7 DENTAL INFECTION: Primary | ICD-10-CM

## 2020-06-03 DIAGNOSIS — G89.29 CHRONIC BILATERAL LOW BACK PAIN WITHOUT SCIATICA: ICD-10-CM

## 2020-06-03 DIAGNOSIS — F19.20 CHEMICAL DEPENDENCY (H): ICD-10-CM

## 2020-06-03 DIAGNOSIS — M54.50 CHRONIC BILATERAL LOW BACK PAIN WITHOUT SCIATICA: ICD-10-CM

## 2020-06-03 PROCEDURE — 99214 OFFICE O/P EST MOD 30 MIN: CPT | Mod: 95 | Performed by: FAMILY MEDICINE

## 2020-06-03 RX ORDER — LIDOCAINE 4 G/G
1 PATCH TOPICAL EVERY 24 HOURS
Qty: 30 PATCH | Refills: 2 | Status: SHIPPED | OUTPATIENT
Start: 2020-06-03 | End: 2020-10-20

## 2020-06-03 RX ORDER — LIDOCAINE HYDROCHLORIDE 20 MG/ML
15 SOLUTION OROPHARYNGEAL
Qty: 200 ML | Refills: 1 | Status: SHIPPED | OUTPATIENT
Start: 2020-06-03 | End: 2020-10-20

## 2020-06-03 RX ORDER — CLINDAMYCIN HCL 150 MG
150 CAPSULE ORAL 3 TIMES DAILY
Qty: 21 CAPSULE | Refills: 0 | Status: SHIPPED | OUTPATIENT
Start: 2020-06-03 | End: 2020-06-10

## 2020-06-03 NOTE — PROGRESS NOTES
"Jose Luis Gonzáles is a 50 year old male who is being evaluated via a billable telephone visit.      The patient has been notified of following:     \"This telephone visit will be conducted via a call between you and your physician/provider. We have found that certain health care needs can be provided without the need for a physical exam.  This service lets us provide the care you need with a short phone conversation.  If a prescription is necessary we can send it directly to your pharmacy.  If lab work is needed we can place an order for that and you can then stop by our lab to have the test done at a later time.    Telephone visits are billed at different rates depending on your insurance coverage. During this emergency period, for some insurers they may be billed the same as an in-person visit.  Please reach out to your insurance provider with any questions.    If during the course of the call the physician/provider feels a telephone visit is not appropriate, you will not be charged for this service.\"    Patient has given verbal consent for Telephone visit?  Yes    What phone number would you like to be contacted at? [phone]    How would you like to obtain your AVS? Karen Tabares     Jose Luis Gonzáles is a 50 year old male who presents via phone visit today for the following health issues:    HPI  Tooth pain       Duration: about 10 years     Description (location/character/radiation): right lower part of jaw, root canal fallen off and infected. Need a referral to see a dentist.     Intensity:  severe    Accompanying signs and symptoms: cannot eat much, less sleep     History (similar episodes/previous evaluation): None    Precipitating or alleviating factors: None    Therapies tried and outcome: pain killers      Spoke with patient via phone regarding tooth issues as above.  Well-known to me and he has had some recurrent dental infections.  Problem is he is looking to find an emergency dentist and " right now his closest appointment is 3 weeks away.  Worried that he has an infection going on having lots of pain.  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  No fevers or chills but it is hard to eat and he feels like he is losing weight.  Still struggling with some anxiety and difficulty sleeping.  Neck and back are still ongoing issues but he feels like he is dealing with things okay.    Objective   Reported vitals:  There were no vitals taken for this visit.   healthy, alert and no distress  PSYCH: Alert and oriented times 3; coherent speech, normal   rate and volume, able to articulate logical thoughts, able   to abstract reason, no tangential thoughts, no hallucinations   or delusions  His affect is normal  RESP: No cough, no audible wheezing, able to talk in full sentences  Remainder of exam unable to be completed due to telephone visits    Diagnostic Test Results:  Labs reviewed in Epic        Assessment/Plan:  1. Dental infection  We will get him started on a short course of antibiotics and topical therapy.  Try to find an emergency dentist.  - clindamycin (CLEOCIN) 150 MG capsule; Take 1 capsule (150 mg) by mouth 3 times daily for 7 days  Dispense: 21 capsule; Refill: 0  - lidocaine (XYLOCAINE) 2 % solution; Swish and spit 15 mLs in mouth every 3 hours as needed for pain ; Max 8 doses/24 hour period.  Dispense: 200 mL; Refill: 1    2. Chemical dependency (H)  Continue to monitor and staying away from narcotics    3. Chronic bilateral low back pain without sciatica    - Lidocaine (LIDOCARE) 4 % Patch; Place 1 patch onto the skin every 24 hours To prevent lidocaine toxicity, patient should be patch free for 12 hrs daily.  Dispense: 30 patch; Refill: 2    No follow-ups on file.      Phone call duration:  12 minutes    Tonie Cui MD

## 2020-06-19 DIAGNOSIS — F41.1 GENERALIZED ANXIETY DISORDER: ICD-10-CM

## 2020-06-19 DIAGNOSIS — F31.9 BIPOLAR AFFECTIVE DISORDER, REMISSION STATUS UNSPECIFIED (H): ICD-10-CM

## 2020-06-19 NOTE — TELEPHONE ENCOUNTER
Med refill request requested today.  PCP is back in the office next business day. History of chemical dependency and bipolar disorder  Was given 30 klonopin on 3/20/20 with 2 refills. - last visit regarding anxiety 3 months ago- needs approval from PCP- I am not comfortable filling - visit earlier this month does not address refill of klonopin  Given PCP in office next business day - will route to him to address and/or approve or deny

## 2020-06-19 NOTE — TELEPHONE ENCOUNTER
Requested Prescriptions   Pending Prescriptions Disp Refills     clonazePAM (KLONOPIN) 1 MG tablet [Pharmacy Med Name: CLONAZEPAM 1MG TABLETS] 30 tablet      Sig: TAKE 1 TABLET BY MOUTH TWICE DAILY AS NEEDED       There is no refill protocol information for this order        Routing refill request to provider for review/approval because:  Drug not on the [facility] refill protocol       Lian Rodriguez RN, BSN, PHN

## 2020-06-21 RX ORDER — CLONAZEPAM 1 MG/1
TABLET ORAL
Qty: 30 TABLET | Refills: 0 | Status: SHIPPED | OUTPATIENT
Start: 2020-06-22 | End: 2020-07-07

## 2020-07-07 ENCOUNTER — VIRTUAL VISIT (OUTPATIENT)
Dept: FAMILY MEDICINE | Facility: CLINIC | Age: 50
End: 2020-07-07
Payer: COMMERCIAL

## 2020-07-07 ENCOUNTER — TELEPHONE (OUTPATIENT)
Dept: FAMILY MEDICINE | Facility: CLINIC | Age: 50
End: 2020-07-07

## 2020-07-07 DIAGNOSIS — F31.9 BIPOLAR AFFECTIVE DISORDER, REMISSION STATUS UNSPECIFIED (H): ICD-10-CM

## 2020-07-07 DIAGNOSIS — F41.1 GENERALIZED ANXIETY DISORDER: ICD-10-CM

## 2020-07-07 DIAGNOSIS — S99.911A INJURY OF RIGHT ANKLE, INITIAL ENCOUNTER: Primary | ICD-10-CM

## 2020-07-07 PROCEDURE — 99214 OFFICE O/P EST MOD 30 MIN: CPT | Mod: 95 | Performed by: FAMILY MEDICINE

## 2020-07-07 RX ORDER — CLONAZEPAM 1 MG/1
1 TABLET ORAL 3 TIMES DAILY PRN
Qty: 60 TABLET | Refills: 0 | Status: SHIPPED | OUTPATIENT
Start: 2020-07-07 | End: 2020-07-07

## 2020-07-07 RX ORDER — CLONAZEPAM 1 MG/1
1 TABLET ORAL 3 TIMES DAILY PRN
Qty: 60 TABLET | Refills: 0 | Status: SHIPPED | OUTPATIENT
Start: 2020-07-07 | End: 2020-08-04

## 2020-07-07 NOTE — TELEPHONE ENCOUNTER
Called number pt gave.  # is for [employer].    They state provider needs to login and fill out forms for pt  [email or web address]    Jenny BREEN, Patient Care

## 2020-07-07 NOTE — TELEPHONE ENCOUNTER
I talked about this with the patient.  I have also searched through the website extensively.  I do not have access as I am not a [facility] provider per se.  I did find a place where I can request access but I am not sure how to fill out some of those things such as our clinic ID number, etc.    Could we please call them Wednesday and ask this:  I have been the patient's primary care doctor for more than 10 years and I am requesting that I be able to continue to provide him primary care.  So how do I login and find the forms to make this happen?

## 2020-07-07 NOTE — TELEPHONE ENCOUNTER
.Reason for call:  Form   Our goal is to have forms completed within 72 hours, however some forms may require a visit or additional information.     Additional comments: pt is requesting a continuation of care form be filled out provided by his insurance. Please have the provider call this number to get the form. Only the pt's medical team is able to access the form. [phone]    Type of letter, form or note: medical    Phone number to reach patient:  Home number on file [phone] (home)    Best Time:  anytime    Can we leave a detailed message on this number?  YES    Travel screening: Negative

## 2020-07-07 NOTE — PATIENT INSTRUCTIONS
At [facility], we strive to deliver an exceptional experience to you, every time we see you.  If you receive a survey in the mail, please send us back your thoughts. We really do value your feedback.    Based on your medical history, these are the current health maintenance/preventive care services that you are due for (some may have been done at this visit.)  Health Maintenance Due   Topic Date Due     COLORECTAL CANCER SCREENING  05/22/1979     LIPID  10/21/2014     PREVENTIVE CARE VISIT  09/18/2016     ZOSTER IMMUNIZATION (1 of 2) 05/22/2019     URINE DRUG SCREEN  11/24/2019     EYE EXAM  12/12/2019         Suggested websites for health information:  Www.[place].org : Up to date and easily searchable information on multiple topics.  Www.medlineplus.gov : medication info, interactive tutorials, watch real surgeries online  Www.familydoctor.org : good info from the Academy of Family Physicians  Www.cdc.gov : public health info, travel advisories, epidemics (H1N1)  Www.aap.org : children's health info, normal development, vaccinations  Www.health.[facility].[place].us : [place] dept of health, public health issues in [place], N1N1    Your care team:     Family Medicine   LISA Reyes MD Emily Bunt, JULI [facility]   S. MD Sweta David MD Angela Wermerskirchen, MD         Clinic hours: Monday - Wednesday 7 am-7 pm   Thursdays and Fridays 7 am-5 pm.     [facility]: Monday - Friday 11 am-9 pm,   Saturday and Sunday 9 am-5 pm.    [facility]: Monday -Thursday 8 am-8 pm; Friday 8 am-6 pm; Saturday and Sunday 9 am-5 pm.     [facility]: Monday - Thursday 8 am - 7 pm; Friday 8 am - 6 pm    Clinic: [phone]   [facility]: [phone]   [facility]: [phone]
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
## 2020-07-08 RX ORDER — CLONAZEPAM 1 MG/1
TABLET ORAL
Qty: 30 TABLET | OUTPATIENT
Start: 2020-07-08

## 2020-07-08 NOTE — TELEPHONE ENCOUNTER
Called [facility] and spoke to Cori, she stated she will fax this form to me. Waiting for form.  Emani West MA  [facility]  2nd Floor  Primary Care

## 2020-07-08 NOTE — TELEPHONE ENCOUNTER
Received forms and faxed to Dr Cui's doximity, right fax confirmed at 1:05 pm today, 7/8/2020.  Emani West [facility]  2nd Floor  Primary Care

## 2020-07-10 NOTE — TELEPHONE ENCOUNTER
Faxed signed form to [facility], [phone], right fax confirmed at 3:50 pm today, 7/10/2020. Copy to TC and abstracting.  Emani West MA  [facility]  2nd Floor  Primary Care

## 2020-08-18 DIAGNOSIS — F41.1 GENERALIZED ANXIETY DISORDER: ICD-10-CM

## 2020-08-18 DIAGNOSIS — F31.9 BIPOLAR AFFECTIVE DISORDER, REMISSION STATUS UNSPECIFIED (H): ICD-10-CM

## 2020-08-18 RX ORDER — CLONAZEPAM 1 MG/1
TABLET ORAL
Qty: 30 TABLET | Refills: 0 | Status: SHIPPED | OUTPATIENT
Start: 2020-08-18 | End: 2020-09-06

## 2020-08-19 RX ORDER — CLONAZEPAM 1 MG/1
TABLET ORAL
Qty: 60 TABLET | OUTPATIENT
Start: 2020-08-19

## 2020-09-03 DIAGNOSIS — F41.1 GENERALIZED ANXIETY DISORDER: ICD-10-CM

## 2020-09-03 DIAGNOSIS — F31.9 BIPOLAR AFFECTIVE DISORDER, REMISSION STATUS UNSPECIFIED (H): ICD-10-CM

## 2020-09-03 NOTE — TELEPHONE ENCOUNTER
Requested Prescriptions   Pending Prescriptions Disp Refills     clonazePAM (KLONOPIN) 1 MG tablet [Pharmacy Med Name: CLONAZEPAM 1MG TABLETS] 30 tablet      Sig: TAKE 1 TABLET(1 MG) BY MOUTH THREE TIMES DAILY AS NEEDED FOR ANXIETY       There is no refill protocol information for this order        Routing refill request to provider for review/approval because:  Drug not on the [facility] [place]ill protocol       Lian Rodriguez RN, BSN, PHN

## 2020-09-03 NOTE — TELEPHONE ENCOUNTER
Patient calling and would like sent to     [facility]   [address] [place] [phone] instead.

## 2020-09-06 RX ORDER — CLONAZEPAM 1 MG/1
TABLET ORAL
Qty: 30 TABLET | Refills: 0 | Status: SHIPPED | OUTPATIENT
Start: 2020-09-06 | End: 2020-10-20

## 2020-09-06 RX ORDER — CLONAZEPAM 1 MG/1
TABLET ORAL
Qty: 30 TABLET | Refills: 0 | OUTPATIENT
Start: 2020-09-06

## 2020-10-19 DIAGNOSIS — F31.9 BIPOLAR AFFECTIVE DISORDER, REMISSION STATUS UNSPECIFIED (H): ICD-10-CM

## 2020-10-19 DIAGNOSIS — F41.1 GENERALIZED ANXIETY DISORDER: ICD-10-CM

## 2020-10-19 NOTE — TELEPHONE ENCOUNTER
Routing refill request to provider for review/approval because:  Drug not on the [facility] refill protocol     Nolvia Murphy RN

## 2020-10-20 RX ORDER — CLONAZEPAM 1 MG/1
TABLET ORAL
Qty: 30 TABLET | Refills: 0 | Status: SHIPPED | OUTPATIENT
Start: 2020-10-20 | End: 2020-11-08

## 2020-11-06 ENCOUNTER — TELEPHONE (OUTPATIENT)
Dept: FAMILY MEDICINE | Facility: CLINIC | Age: 50
End: 2020-11-06

## 2020-11-06 DIAGNOSIS — F41.1 GENERALIZED ANXIETY DISORDER: ICD-10-CM

## 2020-11-06 DIAGNOSIS — F31.9 BIPOLAR AFFECTIVE DISORDER, REMISSION STATUS UNSPECIFIED (H): ICD-10-CM

## 2020-11-06 NOTE — TELEPHONE ENCOUNTER
"Clinic Action Needed:YES  Reason for Call:\"I called the clinic this morning for a refill on my RX   clonazePAM (KLONOPIN) 1 MG tablet 30 tablet 0 10/20/2020  No   Sig: TAKE 1 TABLET(1 MG) BY MOUTH THREE TIMES DAILY AS NEEDED FOR ANXIETY     \"I haven't heard back.\"  Patient Recommendations/Teaching:Will route another request to PCP  Routed to:PCP  Dorothea Shook RN [facility]          "

## 2020-11-06 NOTE — TELEPHONE ENCOUNTER
Asking to be filled as soon as possible  Can he get a larger quantity as he takes more than one sometimes  Like before dentist    Call to advise if needed  [phone]

## 2020-11-06 NOTE — TELEPHONE ENCOUNTER
Routing refill request to provider for review/approval because:  Drug not on the [facility] refill protocol     Please see message below    Nolvia Murphy RN

## 2020-11-06 NOTE — TELEPHONE ENCOUNTER
Requested Prescriptions   Pending Prescriptions Disp Refills     clonazePAM (KLONOPIN) 1 MG tablet [Pharmacy Med Name: CLONAZEPAM 1MG TABLETS] 30 tablet      Sig: TAKE 1 TABLET(1 MG) BY MOUTH THREE TIMES DAILY AS NEEDED FOR ANXIETY       There is no refill protocol information for this order        Routing refill request to provider for review/approval because:  Drug not on the [facility] [place]ill [place]

## 2020-11-08 RX ORDER — CLONAZEPAM 1 MG/1
TABLET ORAL
Qty: 60 TABLET | Refills: 0 | Status: SHIPPED | OUTPATIENT
Start: 2020-11-08 | End: 2020-12-09

## 2020-12-08 DIAGNOSIS — L64.9 MALE PATTERN BALDNESS: ICD-10-CM

## 2020-12-08 DIAGNOSIS — F31.9 BIPOLAR AFFECTIVE DISORDER, REMISSION STATUS UNSPECIFIED (H): ICD-10-CM

## 2020-12-08 DIAGNOSIS — F41.1 GENERALIZED ANXIETY DISORDER: ICD-10-CM

## 2020-12-09 RX ORDER — CLONAZEPAM 1 MG/1
TABLET ORAL
Qty: 60 TABLET | Refills: 0 | Status: SHIPPED | OUTPATIENT
Start: 2020-12-09 | End: 2021-01-08

## 2020-12-09 RX ORDER — FINASTERIDE 5 MG/1
1 TABLET, FILM COATED ORAL DAILY
Qty: 30 TABLET | Refills: 5 | Status: SHIPPED | OUTPATIENT
Start: 2020-12-09 | End: 2021-08-11

## 2020-12-09 NOTE — TELEPHONE ENCOUNTER
Reason for Call:  Other prescription    Detailed comments: Pt calling for he mentioned his Pharmacy has attempted to reach out to Dr. uCi last week regarding his two Rx's but has not heard back.  He would like for Dr. Cui to send his Rx's to Pharmacy as soon as possible. Pt would like a call back to confirm Rx's have been sent.     Phone Number Patient can be reached at: Home number on file [phone] (home)    Best Time: anytime    Can we leave a detailed message on this number? YES    Call taken on 12/9/2020 at 8:37 AM by Ashok Forde

## 2020-12-09 NOTE — TELEPHONE ENCOUNTER
Routing refill request to provider for review/approval because:  Drug not on the [facility] refill protocol   A break in medication: finasteride last filled 1/7/2020 for 30 plus 5.    Cassandra Montalvo RN, [facility] Triage

## 2020-12-14 NOTE — TELEPHONE ENCOUNTER
Refilled x 1   
Requested Prescriptions   Pending Prescriptions Disp Refills     clonazePAM (KLONOPIN) 1 MG tablet 60 tablet 0     Sig: Take 1 tablet (1 mg) by mouth 2 times daily as needed for agitation or anxiety       There is no refill protocol information for this order          clonazePAM (KLONOPIN) 1 MG tablet      Last Written Prescription Date:  10/2/19  Last Fill Quantity: 60,   # refills: 0  Last Office Visit: 10/21/19  Future Office visit:       Routing refill request to provider for review/approval because:  Drug not on the [facility] refill protocol or controlled substance    
Routing refill request to provider for review/approval because:  Drug not on the [facility] refill protocol   Nolvia Murphy RN        
[id number] [place] [address] ([place]))      
Regular diet as tolerated.

## 2021-01-08 DIAGNOSIS — F41.1 GENERALIZED ANXIETY DISORDER: ICD-10-CM

## 2021-01-08 DIAGNOSIS — F31.9 BIPOLAR AFFECTIVE DISORDER, REMISSION STATUS UNSPECIFIED (H): ICD-10-CM

## 2021-01-08 RX ORDER — CLONAZEPAM 1 MG/1
TABLET ORAL
Qty: 60 TABLET | Refills: 0 | Status: SHIPPED | OUTPATIENT
Start: 2021-01-08 | End: 2021-01-25

## 2021-01-08 NOTE — TELEPHONE ENCOUNTER
Patient calling and stated he know he has to scheduled an appointment with Dr. Cui to be able to refill his meds but he is out and needs the refill now, this writer was unable to schedule a appointment with Dr. Cui at this time patient stated he will call back next week to schedule the appointment  but is out of the med.  Please call patient with questions.  Lester Sprague,  For 1st Floor Primary Care

## 2021-01-25 ENCOUNTER — TELEPHONE (OUTPATIENT)
Dept: FAMILY MEDICINE | Facility: CLINIC | Age: 51
End: 2021-01-25

## 2021-01-25 DIAGNOSIS — F41.1 GENERALIZED ANXIETY DISORDER: ICD-10-CM

## 2021-01-25 DIAGNOSIS — F31.9 BIPOLAR AFFECTIVE DISORDER, REMISSION STATUS UNSPECIFIED (H): ICD-10-CM

## 2021-01-25 RX ORDER — CLONAZEPAM 1 MG/1
TABLET ORAL
Qty: 60 TABLET | Refills: 0 | Status: SHIPPED | OUTPATIENT
Start: 2021-01-25 | End: 2021-02-02
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
## 2021-02-02 ENCOUNTER — OFFICE VISIT (OUTPATIENT)
Dept: FAMILY MEDICINE | Facility: CLINIC | Age: 51
End: 2021-02-02
Payer: COMMERCIAL

## 2021-02-02 VITALS
OXYGEN SATURATION: 99 % | WEIGHT: 147.4 LBS | SYSTOLIC BLOOD PRESSURE: 122 MMHG | BODY MASS INDEX: 21.1 KG/M2 | HEART RATE: 79 BPM | RESPIRATION RATE: 14 BRPM | DIASTOLIC BLOOD PRESSURE: 82 MMHG | HEIGHT: 70 IN

## 2021-02-02 DIAGNOSIS — F19.20 CHEMICAL DEPENDENCY (H): ICD-10-CM

## 2021-02-02 DIAGNOSIS — Z12.11 COLON CANCER SCREENING: ICD-10-CM

## 2021-02-02 DIAGNOSIS — F31.9 BIPOLAR AFFECTIVE DISORDER, REMISSION STATUS UNSPECIFIED (H): ICD-10-CM

## 2021-02-02 DIAGNOSIS — Z00.00 ROUTINE GENERAL MEDICAL EXAMINATION AT A HEALTH CARE FACILITY: Primary | ICD-10-CM

## 2021-02-02 DIAGNOSIS — F41.1 GENERALIZED ANXIETY DISORDER: ICD-10-CM

## 2021-02-02 DIAGNOSIS — Z12.5 PROSTATE CANCER SCREENING: ICD-10-CM

## 2021-02-02 PROCEDURE — 99396 PREV VISIT EST AGE 40-64: CPT | Performed by: FAMILY MEDICINE

## 2021-02-02 RX ORDER — CLONAZEPAM 1 MG/1
TABLET ORAL
Qty: 60 TABLET | Refills: 0 | Status: SHIPPED | OUTPATIENT
Start: 2021-02-20 | End: 2021-03-16

## 2021-02-02 ASSESSMENT — MIFFLIN-ST. JEOR: SCORE: 1529.85
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
## 2021-02-02 NOTE — PROGRESS NOTES
SUBJECTIVE:   CC: Jose Luis Gonzáles is an 50 year old male who presents for preventative health visit.       Patient has been advised of split billing requirements and indicates understanding: Yes  Healthy Habits:    Getting at least 3 servings of Calcium per day:  Yes    Bi-annual eye exam:  NO    Dental care twice a year:  NO    Sleep apnea or symptoms of sleep apnea:  Excessive snoring    Diet:  Other (patient says no )    Frequency of exercise:  None    Duration of exercise:  N/A    Barriers to taking medications:  None    Medication side effects:  None    PHQ-2 Total Score:    Additional concerns today:  Yes      Thyroid questions   Patient is loosing weight and cannot put on weight     Today's PHQ-2 Score:   PHQ-2 ( 1999 Pfizer) 6/3/2020   Q1: Little interest or pleasure in doing things 1   Q2: Feeling down, depressed or hopeless 1   PHQ-2 Score 2       Abuse: Current or Past(Physical, Sexual or Emotional)- No  Do you feel safe in your environment? Yes        Social History     Tobacco Use     Smoking status: Current Some Day Smoker     Packs/day: 0.25     Types: Cigarettes     Smokeless tobacco: Never Used     Tobacco comment: 3 cigs   Substance Use Topics     Alcohol use: Yes     Comment: 1 L fluid daily     If you drink alcohol do you typically have >3 drinks per day or >7 drinks per week? No    Alcohol Use 2/2/2021   Prescreen: >3 drinks/day or >7 drinks/week? No       Last PSA:   PSA   Date Value Ref Range Status   10/21/2009 0.76 0 - 4 ug/L Final       Reviewed orders with patient. Reviewed health maintenance and updated orders accordingly - Yes  Labs reviewed in EPIC  BP Readings from Last 3 Encounters:   02/02/21 122/82   10/02/19 (!) 163/92   05/24/19 135/88    Wt Readings from Last 3 Encounters:   02/02/21 66.9 kg (147 lb 6.4 oz)   10/02/19 65.3 kg (144 lb)   05/24/19 74.4 kg (164 lb)                    Reviewed and updated as needed this visit by clinical staff  Tobacco  Allergies  Meds   "Problems  Med Hx  Surg Hx  Fam Hx          Reviewed and updated as needed this visit by Provider  Tobacco  Allergies  Meds  Problems  Med Hx  Surg Hx  Fam Hx         Here today for routine checkup.  For most part the patient is doing okay overall.  Back to working full-time and is buying his townhouse back.  So from a socioeconomic standpoint things are normalizing.  Current insurance is fairly lousy and he wants to get back on you care.  So he wants to hold off on screening test until that is in place.  Still maintaining sobriety.    Review of Systems  CONSTITUTIONAL: NEGATIVE for fever, chills, change in weight  INTEGUMENTARY/SKIN: NEGATIVE for worrisome rashes, moles or lesions  EYES: NEGATIVE for vision changes or irritation  ENT: NEGATIVE for ear, mouth and throat problems  RESP: NEGATIVE for significant cough or SOB  CV: NEGATIVE for chest pain, palpitations or peripheral edema  GI: NEGATIVE for nausea, abdominal pain, heartburn, or change in bowel habits   male: negative for dysuria, hematuria, decreased urinary stream, erectile dysfunction, urethral discharge  MUSCULOSKELETAL: NEGATIVE for significant arthralgias or myalgia  NEURO: NEGATIVE for weakness, dizziness or paresthesias  PSYCHIATRIC: NEGATIVE for changes in mood or affect    OBJECTIVE:   /82   Pulse 79   Resp 14   Ht 1.778 m (5' 10\")   Wt 66.9 kg (147 lb 6.4 oz)   SpO2 99%   BMI 21.15 kg/m      Physical Exam  GENERAL: healthy, alert and no distress  EYES: Eyes grossly normal to inspection, PERRL and conjunctivae and sclerae normal  HENT: ear canals and TM's normal, nose and mouth without ulcers or lesions  NECK: no adenopathy, no asymmetry, masses, or scars and thyroid normal to palpation  RESP: lungs clear to auscultation - no rales, rhonchi or wheezes  CV: regular rate and rhythm, normal S1 S2, no S3 or S4, no murmur, click or rub, no peripheral edema and peripheral pulses strong  ABDOMEN: soft, nontender, no " "hepatosplenomegaly, no masses and bowel sounds normal  MS: no gross musculoskeletal defects noted, no edema  SKIN: no suspicious lesions or rashes  NEURO: Normal strength and tone, mentation intact and speech normal  PSYCH: mentation appears normal, affect normal/bright    Diagnostic Test Results:  Labs reviewed in Epic    ASSESSMENT/PLAN:   1. Routine general medical examination at a health care facility  We discussed routine health maintenance including immunizations and screening test.  We will set these up in the future    2. Bipolar affective disorder, remission status unspecified (H)  Stable on current regimen.  Continue same plan and routine follow-up.   - clonazePAM (KLONOPIN) 1 MG tablet; TAKE 1 TABLET(1 MG) BY MOUTH THREE TIMES DAILY AS NEEDED FOR ANXIETY.  Dispense: 60 tablet; Refill: 0    3. Generalized anxiety disorder  Stable on current regimen.  Continue same plan and routine follow-up.   - clonazePAM (KLONOPIN) 1 MG tablet; TAKE 1 TABLET(1 MG) BY MOUTH THREE TIMES DAILY AS NEEDED FOR ANXIETY.  Dispense: 60 tablet; Refill: 0    4. Chemical dependency (H)  Maintaining sobriety    5. Colon cancer screening  We will set up in the future    6. Prostate cancer screening  As above      Patient has been advised of split billing requirements and indicates understanding: Yes  COUNSELING:   Reviewed preventive health counseling, as reflected in patient instructions       Regular exercise       Healthy diet/nutrition       Vision screening       Hearing screening       Colon cancer screening       Prostate cancer screening    Estimated body mass index is 21.15 kg/m  as calculated from the following:    Height as of this encounter: 1.778 m (5' 10\").    Weight as of this encounter: 66.9 kg (147 lb 6.4 oz).         He reports that he has been smoking cigarettes. He has been smoking about 0.25 packs per day. He has never used smokeless tobacco.  Tobacco Cessation Action Plan:   Information offered: Patient not " interested at this time      Counseling Resources:  ATP IV Guidelines  Pooled Cohorts Equation Calculator  FRAX Risk Assessment  ICSI Preventive Guidelines  Dietary Guidelines for Americans, 2010  USDA's MyPlate  ASA Prophylaxis  Lung CA Screening    Tonie Cui MD  [facility]

## 2021-03-16 DIAGNOSIS — F31.9 BIPOLAR AFFECTIVE DISORDER, REMISSION STATUS UNSPECIFIED (H): ICD-10-CM

## 2021-03-16 DIAGNOSIS — F41.1 GENERALIZED ANXIETY DISORDER: ICD-10-CM

## 2021-03-16 RX ORDER — CLONAZEPAM 1 MG/1
TABLET ORAL
Qty: 60 TABLET | Refills: 0 | Status: SHIPPED | OUTPATIENT
Start: 2021-03-16 | End: 2021-04-08

## 2021-03-16 NOTE — TELEPHONE ENCOUNTER
Routing refill request to provider for review/approval because:  Drug not on the [facility] refill protocol     Maribel HARLEYN, RN

## 2021-04-08 DIAGNOSIS — F41.1 GENERALIZED ANXIETY DISORDER: ICD-10-CM

## 2021-04-08 DIAGNOSIS — F31.9 BIPOLAR AFFECTIVE DISORDER, REMISSION STATUS UNSPECIFIED (H): ICD-10-CM

## 2021-04-08 RX ORDER — CLONAZEPAM 1 MG/1
TABLET ORAL
Qty: 60 TABLET | Refills: 0 | Status: SHIPPED | OUTPATIENT
Start: 2021-04-08 | End: 2021-04-22

## 2021-04-22 DIAGNOSIS — F41.1 GENERALIZED ANXIETY DISORDER: ICD-10-CM

## 2021-04-22 DIAGNOSIS — F31.9 BIPOLAR AFFECTIVE DISORDER, REMISSION STATUS UNSPECIFIED (H): ICD-10-CM

## 2021-04-22 RX ORDER — CLONAZEPAM 1 MG/1
TABLET ORAL
Qty: 30 TABLET | Refills: 0 | Status: SHIPPED | OUTPATIENT
Start: 2021-04-22 | End: 2021-05-10

## 2021-05-07 ENCOUNTER — VIRTUAL VISIT (OUTPATIENT)
Dept: FAMILY MEDICINE | Facility: CLINIC | Age: 51
End: 2021-05-07
Payer: COMMERCIAL

## 2021-05-07 ENCOUNTER — TELEPHONE (OUTPATIENT)
Dept: FAMILY MEDICINE | Facility: CLINIC | Age: 51
End: 2021-05-07

## 2021-05-07 DIAGNOSIS — M54.50 ACUTE RIGHT-SIDED LOW BACK PAIN WITHOUT SCIATICA: Primary | ICD-10-CM

## 2021-05-07 PROCEDURE — 99214 OFFICE O/P EST MOD 30 MIN: CPT | Mod: 95 | Performed by: FAMILY MEDICINE

## 2021-05-07 RX ORDER — CELECOXIB 200 MG/1
200 CAPSULE ORAL 2 TIMES DAILY
Qty: 30 CAPSULE | Refills: 0 | Status: SHIPPED | OUTPATIENT
Start: 2021-05-07 | End: 2021-08-11

## 2021-05-07 RX ORDER — METHYLPREDNISOLONE 4 MG
TABLET, DOSE PACK ORAL
Qty: 21 TABLET | Refills: 0 | Status: SHIPPED | OUTPATIENT
Start: 2021-05-07 | End: 2021-08-11

## 2021-05-07 NOTE — PROGRESS NOTES
.Jose Luis is a 50 year old who is being evaluated via a billable telephone visit.      What phone number would you like to be contacted at? cell  How would you like to obtain your AVS? 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  No radiating pain.  Short-term goal of pain relief and inflammation relief.  Discussed some rest and relative stretching and exercise.  Topical therapies as well.  Contact me over the course of the next week with progress.  - methylPREDNISolone (MEDROL DOSEPAK) 4 MG tablet therapy pack; Follow Package Directions  - tiZANidine (ZANAFLEX) 4 MG tablet; Take 1-2 tablets (4-8 mg) by mouth 3 times daily as needed for muscle spasms  - celecoxib (CELEBREX) 200 MG capsule; Take 1 capsule (200 mg) by mouth 2 times daily       See Patient Instructions    Return in about 1 week (around 5/14/2021) for Contact me with progress, [employer] message.    Tonie Cui MD  [facility]   Jose Luis is a 50 year old who presents for the following health issues     HPI     Met with patient today to discuss some acute low back pain.  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  No radiation down the leg.  No neurologic symptoms.    Review of Systems   Constitutional, HEENT, cardiovascular, pulmonary, gi and gu systems are negative, except as otherwise noted.      Objective           Vitals:  No vitals were obtained today due to virtual visit.    Physical Exam   healthy, alert and no distress  PSYCH: Alert and oriented times 3; coherent speech, normal   rate and volume, able to articulate logical thoughts, able   to abstract reason, no tangential thoughts, no hallucinations   or delusions  His affect is normal  RESP: No cough, no audible wheezing, able to talk in full sentences  Remainder of exam unable to be completed due to telephone  visits    Past labs reviewed with the patient.             Phone call duration: 11 minutes

## 2021-05-10 DIAGNOSIS — F31.9 BIPOLAR AFFECTIVE DISORDER, REMISSION STATUS UNSPECIFIED (H): ICD-10-CM

## 2021-05-10 DIAGNOSIS — F41.1 GENERALIZED ANXIETY DISORDER: ICD-10-CM

## 2021-05-10 RX ORDER — CLONAZEPAM 1 MG/1
TABLET ORAL
Qty: 60 TABLET | Refills: 0 | Status: SHIPPED | OUTPATIENT
Start: 2021-05-10 | End: 2021-06-03

## 2021-06-03 DIAGNOSIS — F31.9 BIPOLAR AFFECTIVE DISORDER, REMISSION STATUS UNSPECIFIED (H): ICD-10-CM

## 2021-06-03 DIAGNOSIS — F41.1 GENERALIZED ANXIETY DISORDER: ICD-10-CM

## 2021-06-03 RX ORDER — CLONAZEPAM 1 MG/1
TABLET ORAL
Qty: 60 TABLET | Refills: 0 | Status: SHIPPED | OUTPATIENT
Start: 2021-06-03 | End: 2021-07-08

## 2021-06-03 NOTE — TELEPHONE ENCOUNTER
Routing refill request to provider for review/approval because:  Drug not on the [facility] refill protocol   Abbey Villa BSN, RN

## 2021-06-07 ENCOUNTER — VIRTUAL VISIT (OUTPATIENT)
Dept: FAMILY MEDICINE | Facility: CLINIC | Age: 51
End: 2021-06-07
Payer: COMMERCIAL

## 2021-06-07 DIAGNOSIS — M54.2 NECK PAIN: Primary | ICD-10-CM

## 2021-06-07 PROCEDURE — 99214 OFFICE O/P EST MOD 30 MIN: CPT | Mod: 95 | Performed by: FAMILY MEDICINE

## 2021-06-07 RX ORDER — METHYLPREDNISOLONE 4 MG
TABLET, DOSE PACK ORAL
Qty: 21 TABLET | Refills: 0 | Status: SHIPPED | OUTPATIENT
Start: 2021-06-07 | End: 2021-08-11

## 2021-06-07 RX ORDER — CYCLOBENZAPRINE HCL 10 MG
10 TABLET ORAL 3 TIMES DAILY PRN
Qty: 30 TABLET | Refills: 0 | Status: SHIPPED | OUTPATIENT
Start: 2021-06-07 | End: 2021-07-08
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
## 2021-06-17 ENCOUNTER — OFFICE VISIT (OUTPATIENT)
Dept: URGENT CARE | Facility: URGENT CARE | Age: 51
End: 2021-06-17
Payer: COMMERCIAL

## 2021-06-17 VITALS
DIASTOLIC BLOOD PRESSURE: 85 MMHG | BODY MASS INDEX: 20.29 KG/M2 | WEIGHT: 141.4 LBS | OXYGEN SATURATION: 99 % | SYSTOLIC BLOOD PRESSURE: 134 MMHG | RESPIRATION RATE: 16 BRPM | TEMPERATURE: 98.5 F | HEART RATE: 90 BPM

## 2021-06-17 DIAGNOSIS — M62.830 BACK MUSCLE SPASM: Primary | ICD-10-CM

## 2021-06-17 PROCEDURE — 20552 NJX 1/MLT TRIGGER POINT 1/2: CPT | Performed by: PHYSICIAN ASSISTANT

## 2021-06-17 PROCEDURE — 99213 OFFICE O/P EST LOW 20 MIN: CPT | Mod: 25 | Performed by: PHYSICIAN ASSISTANT

## 2021-06-17 RX ORDER — TRIAMCINOLONE ACETONIDE 40 MG/ML
40 INJECTION, SUSPENSION INTRA-ARTICULAR; INTRAMUSCULAR ONCE
Status: COMPLETED | OUTPATIENT
Start: 2021-06-17 | End: 2021-06-17

## 2021-06-17 RX ADMIN — TRIAMCINOLONE ACETONIDE 40 MG: 40 INJECTION, SUSPENSION INTRA-ARTICULAR; INTRAMUSCULAR at 15:45

## 2021-06-17 ASSESSMENT — ENCOUNTER SYMPTOMS
NECK PAIN: 0
WHEEZING: 0
CONSTITUTIONAL NEGATIVE: 1
BACK PAIN: 1
COUGH: 0
CHILLS: 0
VOMITING: 0
RESPIRATORY NEGATIVE: 1
HEMATURIA: 0
CHEST TIGHTNESS: 0
DYSURIA: 0
PALPITATIONS: 0
SHORTNESS OF BREATH: 0
HEADACHES: 0
CARDIOVASCULAR NEGATIVE: 1
ALLERGIC/IMMUNOLOGIC NEGATIVE: 1
NEUROLOGICAL NEGATIVE: 1
NAUSEA: 0
FREQUENCY: 0
SORE THROAT: 0
DIARRHEA: 0
GASTROINTESTINAL NEGATIVE: 1
MYALGIAS: 0
ABDOMINAL PAIN: 0
FEVER: 0

## 2021-06-17 ASSESSMENT — PAIN SCALES - GENERAL: PAINLEVEL: WORST PAIN (10)

## 2021-06-17 NOTE — PROGRESS NOTES
Chief Complaint:    Chief Complaint   Patient presents with     Back Pain     Patient has been having recurrent back pain for many years- followed by - patient pulled something in his back today in the middle lower back. Pain 10/10         Medical Decision Making:    Differential Diagnosis:  Back muscle spasm, back strain     ASSESSMENT:     1. Back muscle spasm         PLAN:     Trigger Point Injection  After informed consent was obtained including risks, benefits, and alternatives, I did perform a trigger point injection of the patient's bilateral lumbar region.  After I was able to demarcate the painful area, I cleansed the skin with alcohol. I then used sterile technique and a 27-gauge 1.5-inch needle to infuse 40 mg of Kenalog along with 4 mL of 1% plain lidocaine using sterile technique. Patient tolerated this well with no complications. Patient verbalized significant relief if symptoms.    Ice the affected area.  Tylenol and or ibuprofen for any pain.  Continue Flexeril PRN.    Patient instructed to follow up with PCP in 1 week if symptoms are not improving.  Sooner if symptoms worsen.  Worrisome symptoms discussed with instructions to go to the ED.  Patient verbalized understanding and agreed with this plan.    Labs:     No results found for any visits on 06/17/21.    Current Meds:    Current Outpatient Medications:      clonazePAM (KLONOPIN) 1 MG tablet, TAKE 1 TABLET(1 MG) BY MOUTH THREE TIMES DAILY AS NEEDED FOR ANXIETY, Disp: 60 tablet, Rfl: 0     cyclobenzaprine (FLEXERIL) 10 MG tablet, Take 1 tablet (10 mg) by mouth 3 times daily as needed for muscle spasms, Disp: 30 tablet, Rfl: 0     finasteride (PROSCAR) 5 MG tablet, Take 1 tablet (5 mg) by mouth daily, Disp: 30 tablet, Rfl: 5     celecoxib (CELEBREX) 200 MG capsule, Take 1 capsule (200 mg) by mouth 2 times daily (Patient not taking: Reported on 6/17/2021), Disp: 30 capsule, Rfl: 0     methylPREDNISolone (MEDROL DOSEPAK) 4 MG tablet  therapy pack, Follow Package Directions (Patient not taking: Reported on 6/17/2021), Disp: 21 tablet, Rfl: 0     methylPREDNISolone (MEDROL DOSEPAK) 4 MG tablet therapy pack, Follow Package Directions (Patient not taking: Reported on 6/17/2021), Disp: 21 tablet, Rfl: 0     tiZANidine (ZANAFLEX) 4 MG tablet, Take 1-2 tablets (4-8 mg) by mouth 3 times daily as needed for muscle spasms (Patient not taking: Reported on 6/17/2021), Disp: 30 tablet, Rfl: 0    Current Facility-Administered Medications:      triamcinolone (KENALOG-40) injection 40 mg, 40 mg, Intramuscular, Once, Cj Esteban PA-C    Allergies:  Allergies   Allergen Reactions     Clonidine Nausea     Contrast Dye Hives     Ibuprofen Swelling      Facial/cheek swelling      Keflex [Cephalexin]      Seroquel [Quetiapine]      Makes him anxious     Valproic Acid Nausea and Vomiting       SUBJECTIVE    HPI: Jose Luis Gonzáles is an 51 year old male who presents for evaluation and treatment of low back pain.  Patient has a Hx of chronic neck and back pain.  Patient was working on a [employer] today when a tool slipped and he began to have severe low back pain.  He denies any numbness, tingling or weakness in the legs.  No loss of bowel or bladder control.    ROS:      Review of Systems   Constitutional: Negative.  Negative for chills and fever.   HENT: Negative.  Negative for sore throat.    Respiratory: Negative.  Negative for cough, chest tightness, shortness of breath and wheezing.    Cardiovascular: Negative.  Negative for chest pain and palpitations.   Gastrointestinal: Negative.  Negative for abdominal pain, diarrhea, nausea and vomiting.   Genitourinary: Negative for dysuria, frequency, hematuria and urgency.   Musculoskeletal: Positive for back pain. Negative for myalgias and neck pain.   Skin: Negative for rash.   Allergic/Immunologic: Negative.  Negative for immunocompromised state.   Neurological: Negative.  Negative for headaches.        Family  History   Family History   Problem Relation Age of Onset     Prostate Cancer Father      Macular Degeneration Paternal Grandmother      Glaucoma Paternal Grandmother      Glaucoma Maternal Grandfather      Unknown/Adopted No family hx of      Family History Negative No family hx of      Asthma No family hx of      C.A.D. No family hx of      Diabetes No family hx of      Hypertension No family hx of      Cerebrovascular Disease No family hx of      Breast Cancer No family hx of      Cancer - colorectal No family hx of      Alcohol/Drug No family hx of      Allergies No family hx of      Alzheimer Disease No family hx of      Anesthesia Reaction No family hx of      Arthritis No family hx of      Blood Disease No family hx of      Cancer No family hx of      Cardiovascular No family hx of      Circulatory No family hx of      Congenital Anomalies No family hx of      Connective Tissue Disorder No family hx of      Depression No family hx of      Endocrine Disease No family hx of      Eye Disorder No family hx of      Genetic Disorder No family hx of      Gastrointestinal Disease No family hx of      Genitourinary Problems No family hx of      Gynecology No family hx of      Heart Disease No family hx of      Lipids No family hx of      Musculoskeletal Disorder No family hx of      Neurologic Disorder No family hx of      Obesity No family hx of      Osteoporosis No family hx of      Psychotic Disorder No family hx of      Respiratory No family hx of      Thyroid Disease No family hx of      Hearing Loss No family hx of        Social History  Social History     Socioeconomic History     Marital status: Single     Spouse name: Not on file     Number of children: Not on file     Years of education: Not on file     Highest education level: Not on file   Occupational History     Not on file   Social Needs     Financial resource strain: Not on file     Food insecurity     Worry: Not on file     Inability: Not on file      Transportation needs     Medical: Not on file     Non-medical: Not on file   Tobacco Use     Smoking status: Current Some Day Smoker     Packs/day: 0.25     Types: Cigarettes     Smokeless tobacco: Never Used     Tobacco comment: 3 cigs   Substance and Sexual Activity     Alcohol use: Yes     Comment: 1 L fluid daily     Drug use: Yes     Types: Opiates, Benzodiazepines, Marijuana     Comment: daily     Sexual activity: Never     Partners: Female     Birth control/protection: None   Lifestyle     Physical activity     Days per week: Not on file     Minutes per session: Not on file     Stress: Not on file   Relationships     Social connections     Talks on phone: Not on file     Gets together: Not on file     Attends Shinto service: Not on file     Active member of club or organization: Not on file     Attends meetings of clubs or organizations: Not on file     Relationship status: Not on file     Intimate partner violence     Fear of current or ex partner: Not on file     Emotionally abused: Not on file     Physically abused: Not on file     Forced sexual activity: Not on file   Other Topics Concern     Parent/sibling w/ CABG, MI or angioplasty before 65F 55M? Not Asked   Social History Narrative     Not on file        Surgical History:  Past Surgical History:   Procedure Laterality Date     CARDIAC SURGERY      procedure name unknown. pump between heart and lungs     HEAD & NECK SURGERY      hair transplants        Problem List:  Patient Active Problem List   Diagnosis     Anxiety     Chronic bilateral low back pain without sciatica     Neck pain     Generalized anxiety disorder     Bipolar affective disorder (H)     CARDIOVASCULAR SCREENING; LDL GOAL LESS THAN 160     Tobacco abuse     Pilonidal cyst     GERD (gastroesophageal reflux disease)     Hypertension goal BP (blood pressure) < 140/90     Male pattern baldness     Insomnia     Male hypogonadism     Gastroesophageal reflux disease without esophagitis      Chronic pain syndrome     Hypogonadism male     Chemical dependency (H)           OBJECTIVE:     Vital signs noted and reviewed by Cj Esteban PA-C  /85   Pulse 90   Temp 98.5  F (36.9  C) (Tympanic)   Resp 16   Wt 64.1 kg (141 lb 6.4 oz)   SpO2 99%   BMI 20.29 kg/m       PEFR:    Physical Exam  Vitals signs and nursing note reviewed.   Constitutional:       General: He is not in acute distress.     Appearance: He is well-developed. He is not ill-appearing, toxic-appearing or diaphoretic.   HENT:      Head: Normocephalic and atraumatic.      Right Ear: Hearing, tympanic membrane, ear canal and external ear normal. Tympanic membrane is not perforated, erythematous, retracted or bulging.      Left Ear: Hearing, tympanic membrane, ear canal and external ear normal. Tympanic membrane is not perforated, erythematous, retracted or bulging.      Nose: Nose normal. No mucosal edema, congestion or rhinorrhea.      Mouth/Throat:      Pharynx: No oropharyngeal exudate or posterior oropharyngeal erythema.      Tonsils: No tonsillar exudate or tonsillar abscesses. 0 on the right. 0 on the left.   Eyes:      Pupils: Pupils are equal, round, and reactive to light.   Neck:      Musculoskeletal: Normal range of motion and neck supple.   Cardiovascular:      Rate and Rhythm: Normal rate and regular rhythm.      Heart sounds: Normal heart sounds, S1 normal and S2 normal. Heart sounds not distant. No murmur. No friction rub. No gallop.    Pulmonary:      Effort: Pulmonary effort is normal. No respiratory distress.      Breath sounds: Normal breath sounds. No decreased breath sounds, wheezing, rhonchi or rales.   Abdominal:      General: Bowel sounds are normal. There is no distension.      Palpations: Abdomen is soft.      Tenderness: There is no abdominal tenderness.   Musculoskeletal:      Lumbar back: He exhibits decreased range of motion, tenderness, pain and spasm. He exhibits no bony tenderness, no swelling, no  edema and no deformity.        Back:    Lymphadenopathy:      Cervical: No cervical adenopathy.   Skin:     General: Skin is warm and dry.      Findings: No rash.   Neurological:      Mental Status: He is alert.      Cranial Nerves: No cranial nerve deficit.      Sensory: Sensation is intact.      Motor: Motor function is intact. No weakness, atrophy or abnormal muscle tone.      Coordination: Coordination is intact. Coordination normal.      Gait: Gait is intact. Gait normal.      Deep Tendon Reflexes:      Reflex Scores:       Tricep reflexes are 2+ on the right side and 2+ on the left side.       Bicep reflexes are 2+ on the right side and 2+ on the left side.       Brachioradialis reflexes are 2+ on the right side and 2+ on the left side.       Patellar reflexes are 2+ on the right side and 2+ on the left side.       Achilles reflexes are 2+ on the right side and 2+ on the left side.  Psychiatric:         Attention and Perception: He is attentive.         Speech: Speech normal.         Behavior: Behavior normal. Behavior is cooperative.         Thought Content: Thought content normal.         Judgment: Judgment normal.             Cj Esteban PA-C  6/17/2021, 3:20 PM

## 2021-07-07 DIAGNOSIS — F31.9 BIPOLAR AFFECTIVE DISORDER, REMISSION STATUS UNSPECIFIED (H): ICD-10-CM

## 2021-07-07 DIAGNOSIS — M54.2 NECK PAIN: ICD-10-CM

## 2021-07-07 DIAGNOSIS — F41.1 GENERALIZED ANXIETY DISORDER: ICD-10-CM

## 2021-07-08 RX ORDER — CYCLOBENZAPRINE HCL 10 MG
10 TABLET ORAL 3 TIMES DAILY PRN
Qty: 30 TABLET | Refills: 0 | Status: SHIPPED | OUTPATIENT
Start: 2021-07-08 | End: 2021-10-01

## 2021-07-08 RX ORDER — CLONAZEPAM 1 MG/1
TABLET ORAL
Qty: 60 TABLET | Refills: 0 | Status: SHIPPED | OUTPATIENT
Start: 2021-07-08 | End: 2021-08-11

## 2021-07-08 NOTE — TELEPHONE ENCOUNTER
Pt called back, wondering if provider can also refill Flexeril for him?    He had a lot of dental work done and will be getting more done.     Please call him at [phone].

## 2021-07-08 NOTE — TELEPHONE ENCOUNTER
Routing refill request to provider for review/approval because:  Drug not on the [facility] refill protocol.     Uyen Parks RN  [facility]

## 2021-08-09 DIAGNOSIS — L64.9 MALE PATTERN BALDNESS: ICD-10-CM

## 2021-08-09 DIAGNOSIS — F31.9 BIPOLAR AFFECTIVE DISORDER, REMISSION STATUS UNSPECIFIED (H): ICD-10-CM

## 2021-08-09 DIAGNOSIS — F41.1 GENERALIZED ANXIETY DISORDER: ICD-10-CM

## 2021-08-11 RX ORDER — FINASTERIDE 5 MG/1
TABLET, FILM COATED ORAL
Qty: 30 TABLET | Refills: 5 | Status: SHIPPED | OUTPATIENT
Start: 2021-08-11 | End: 2022-06-24

## 2021-08-11 RX ORDER — CLONAZEPAM 1 MG/1
TABLET ORAL
Qty: 60 TABLET | Refills: 0 | Status: SHIPPED | OUTPATIENT
Start: 2021-08-11 | End: 2021-09-07

## 2021-09-07 DIAGNOSIS — F31.9 BIPOLAR AFFECTIVE DISORDER, REMISSION STATUS UNSPECIFIED (H): ICD-10-CM

## 2021-09-07 DIAGNOSIS — F41.1 GENERALIZED ANXIETY DISORDER: ICD-10-CM

## 2021-09-07 RX ORDER — CLONAZEPAM 1 MG/1
TABLET ORAL
Qty: 60 TABLET | Refills: 0 | Status: SHIPPED | OUTPATIENT
Start: 2021-09-07 | End: 2021-10-01

## 2021-10-01 ENCOUNTER — OFFICE VISIT (OUTPATIENT)
Dept: FAMILY MEDICINE | Facility: CLINIC | Age: 51
End: 2021-10-01
Payer: COMMERCIAL

## 2021-10-01 VITALS
TEMPERATURE: 97.3 F | OXYGEN SATURATION: 97 % | HEART RATE: 101 BPM | DIASTOLIC BLOOD PRESSURE: 80 MMHG | BODY MASS INDEX: 20.81 KG/M2 | SYSTOLIC BLOOD PRESSURE: 140 MMHG | WEIGHT: 145 LBS | RESPIRATION RATE: 18 BRPM

## 2021-10-01 DIAGNOSIS — M54.2 NECK PAIN: ICD-10-CM

## 2021-10-01 DIAGNOSIS — Z12.11 SCREEN FOR COLON CANCER: ICD-10-CM

## 2021-10-01 DIAGNOSIS — R63.4 WEIGHT LOSS: ICD-10-CM

## 2021-10-01 DIAGNOSIS — F41.1 GENERALIZED ANXIETY DISORDER: Primary | ICD-10-CM

## 2021-10-01 DIAGNOSIS — F31.9 BIPOLAR AFFECTIVE DISORDER, REMISSION STATUS UNSPECIFIED (H): ICD-10-CM

## 2021-10-01 PROBLEM — E29.1 HYPOGONADISM MALE: Status: RESOLVED | Noted: 2017-12-05 | Resolved: 2021-10-01

## 2021-10-01 LAB
ALBUMIN SERPL-MCNC: 4 G/DL (ref 3.4–5)
ALP SERPL-CCNC: 45 U/L (ref 40–150)
ALT SERPL W P-5'-P-CCNC: 24 U/L (ref 0–70)
ANION GAP SERPL CALCULATED.3IONS-SCNC: 3 MMOL/L (ref 3–14)
AST SERPL W P-5'-P-CCNC: 10 U/L (ref 0–45)
BILIRUB SERPL-MCNC: 0.3 MG/DL (ref 0.2–1.3)
BUN SERPL-MCNC: 16 MG/DL (ref 7–30)
CALCIUM SERPL-MCNC: 9 MG/DL (ref 8.5–10.1)
CHLORIDE BLD-SCNC: 107 MMOL/L (ref 94–109)
CO2 SERPL-SCNC: 30 MMOL/L (ref 20–32)
CREAT SERPL-MCNC: 1 MG/DL (ref 0.66–1.25)
ERYTHROCYTE [DISTWIDTH] IN BLOOD BY AUTOMATED COUNT: 13 % (ref 10–15)
GFR SERPL CREATININE-BSD FRML MDRD: 86 ML/MIN/1.73M2
GLUCOSE BLD-MCNC: 85 MG/DL (ref 70–99)
HCT VFR BLD AUTO: 42.3 % (ref 40–53)
HGB BLD-MCNC: 13.7 G/DL (ref 13.3–17.7)
MCH RBC QN AUTO: 30.9 PG (ref 26.5–33)
MCHC RBC AUTO-ENTMCNC: 32.4 G/DL (ref 31.5–36.5)
MCV RBC AUTO: 95 FL (ref 78–100)
PLATELET # BLD AUTO: 243 10E3/UL (ref 150–450)
POTASSIUM BLD-SCNC: 4.7 MMOL/L (ref 3.4–5.3)
PROT SERPL-MCNC: 7 G/DL (ref 6.8–8.8)
RBC # BLD AUTO: 4.44 10E6/UL (ref 4.4–5.9)
SODIUM SERPL-SCNC: 140 MMOL/L (ref 133–144)
TSH SERPL DL<=0.005 MIU/L-ACNC: 0.48 MU/L (ref 0.4–4)
WBC # BLD AUTO: 6.9 10E3/UL (ref 4–11)

## 2021-10-01 PROCEDURE — 36415 COLL VENOUS BLD VENIPUNCTURE: CPT | Performed by: FAMILY MEDICINE

## 2021-10-01 PROCEDURE — 99214 OFFICE O/P EST MOD 30 MIN: CPT | Performed by: FAMILY MEDICINE

## 2021-10-01 PROCEDURE — 85027 COMPLETE CBC AUTOMATED: CPT | Performed by: FAMILY MEDICINE

## 2021-10-01 PROCEDURE — 80053 COMPREHEN METABOLIC PANEL: CPT | Performed by: FAMILY MEDICINE

## 2021-10-01 PROCEDURE — 84443 ASSAY THYROID STIM HORMONE: CPT | Performed by: FAMILY MEDICINE

## 2021-10-01 RX ORDER — PREDNISONE 20 MG/1
40 TABLET ORAL DAILY
Qty: 14 TABLET | Refills: 0 | Status: SHIPPED | OUTPATIENT
Start: 2021-10-01 | End: 2021-10-08

## 2021-10-01 RX ORDER — CYCLOBENZAPRINE HCL 10 MG
10 TABLET ORAL 3 TIMES DAILY PRN
Qty: 30 TABLET | Refills: 0 | Status: SHIPPED | OUTPATIENT
Start: 2021-10-01 | End: 2021-10-01

## 2021-10-01 RX ORDER — CLONAZEPAM 1 MG/1
TABLET ORAL
Qty: 60 TABLET | Refills: 0 | Status: SHIPPED | OUTPATIENT
Start: 2021-10-01 | End: 2021-11-03

## 2021-10-01 ASSESSMENT — PATIENT HEALTH QUESTIONNAIRE - PHQ9
SUM OF ALL RESPONSES TO PHQ QUESTIONS 1-9: 10
10. IF YOU CHECKED OFF ANY PROBLEMS, HOW DIFFICULT HAVE THESE PROBLEMS MADE IT FOR YOU TO DO YOUR WORK, TAKE CARE OF THINGS AT HOME, OR GET ALONG WITH OTHER PEOPLE: SOMEWHAT DIFFICULT
SUM OF ALL RESPONSES TO PHQ QUESTIONS 1-9: 10

## 2021-10-01 ASSESSMENT — ANXIETY QUESTIONNAIRES
3. WORRYING TOO MUCH ABOUT DIFFERENT THINGS: MORE THAN HALF THE DAYS
2. NOT BEING ABLE TO STOP OR CONTROL WORRYING: MORE THAN HALF THE DAYS
GAD7 TOTAL SCORE: 15
5. BEING SO RESTLESS THAT IT IS HARD TO SIT STILL: NEARLY EVERY DAY
GAD7 TOTAL SCORE: 15
8. IF YOU CHECKED OFF ANY PROBLEMS, HOW DIFFICULT HAVE THESE MADE IT FOR YOU TO DO YOUR WORK, TAKE CARE OF THINGS AT HOME, OR GET ALONG WITH OTHER PEOPLE?: VERY DIFFICULT
6. BECOMING EASILY ANNOYED OR IRRITABLE: SEVERAL DAYS
7. FEELING AFRAID AS IF SOMETHING AWFUL MIGHT HAPPEN: SEVERAL DAYS
1. FEELING NERVOUS, ANXIOUS, OR ON EDGE: NEARLY EVERY DAY
4. TROUBLE RELAXING: NEARLY EVERY DAY
GAD7 TOTAL SCORE: 15
7. FEELING AFRAID AS IF SOMETHING AWFUL MIGHT HAPPEN: SEVERAL DAYS

## 2021-10-01 NOTE — PROGRESS NOTES
Assessment & Plan     Generalized anxiety disorder  Stable on current regimen.  Continue same plan and routine follow-up.   - clonazePAM (KLONOPIN) 1 MG tablet; TAKE 1 TABLET(1 MG) BY MOUTH THREE TIMES DAILY AS NEEDED FOR ANXIETY    Bipolar affective disorder, remission status unspecified (H)  As above   - clonazePAM (KLONOPIN) 1 MG tablet; TAKE 1 TABLET(1 MG) BY MOUTH THREE TIMES DAILY AS NEEDED FOR ANXIETY    Neck pain  Receives routine Botox injections and these do help.  Occasional flareups for which we will use prednisone and I will get him a prescription to keep on hand.  - predniSONE (DELTASONE) 20 MG tablet; Take 2 tablets (40 mg) by mouth daily for 7 days    Screen for colon cancer  Suggest Cologuard    Weight loss  Has noted some weight loss.  Says he is eating quite a bit.  Does not feel any other associated symptoms and muscle mass seems good.  But he has lost a lot of his adipose tissue.  We will take a look at some basic lab work to see where he sits.  - TSH with free T4 reflex; Future  - CBC with platelets; Future  - Comprehensive metabolic panel (BMP + Alb, Alk Phos, ALT, AST, Total. Bili, TP); Future       Depression Screening Follow Up    PHQ 10/1/2021   PHQ-9 Total Score 10   Q9: Thoughts of better off dead/self-harm past 2 weeks Several days   F/U: Thoughts of suicide or self-harm No   F/U: Safety concerns Yes       See Patient Instructions    Return in about 1 week (around 10/8/2021) for Based upon test results.    Tonie Cui MD  [facility]   Jose Luis is a 51 year old who presents for the following health issues     HPI     -pt would like thyroid check, no known issues with thyroid in the past  -hard time gaining weight, fatigued more than usual      Here today to follow-up on anxiety and bipolar disorder.  Things are very stable overall from medical standpoint.  Still lots of life stressors but he feels that he is handling them.  His biggest  concern is weight loss.    Review of Systems   Constitutional, HEENT, cardiovascular, pulmonary, gi and gu systems are negative, except as otherwise noted.      Objective    BP (!) 140/80   Pulse 101   Temp 97.3  F (36.3  C) (Tympanic)   Resp 18   Wt 65.8 kg (145 lb)   SpO2 97%   BMI 20.81 kg/m    Body mass index is 20.81 kg/m .  Physical Exam   Psych: Alert and oriented times 3; coherent speech, normal   rate and volume, able to articulate logical thoughts, able   to abstract reason, no tangential thoughts, no hallucinations   or delusions  His affect is upbeat and appropriate  S1 and S2 normal, no murmurs, clicks, gallops or rubs. Regular rate and rhythm. Chest is clear; no wheezes or rales. No edema or JVD.  Past labs reviewed with the patient.                 Answers for HPI/ROS submitted by the patient on 10/1/2021  If you checked off any problems, how difficult have these problems made it for you to do your work, take care of things at home, or get along with other people?: Somewhat difficult  PHQ9 TOTAL SCORE: 10  ODALYS 7 TOTAL SCORE: 15

## 2021-10-02 ASSESSMENT — PATIENT HEALTH QUESTIONNAIRE - PHQ9: SUM OF ALL RESPONSES TO PHQ QUESTIONS 1-9: 10

## 2021-10-02 ASSESSMENT — ANXIETY QUESTIONNAIRES: GAD7 TOTAL SCORE: 15

## 2021-11-02 DIAGNOSIS — F41.1 GENERALIZED ANXIETY DISORDER: ICD-10-CM

## 2021-11-02 DIAGNOSIS — F31.9 BIPOLAR AFFECTIVE DISORDER, REMISSION STATUS UNSPECIFIED (H): ICD-10-CM

## 2021-11-03 RX ORDER — CLONAZEPAM 1 MG/1
TABLET ORAL
Qty: 60 TABLET | Refills: 0 | Status: SHIPPED | OUTPATIENT
Start: 2021-11-03 | End: 2021-11-29

## 2021-11-29 DIAGNOSIS — F31.9 BIPOLAR AFFECTIVE DISORDER, REMISSION STATUS UNSPECIFIED (H): ICD-10-CM

## 2021-11-29 DIAGNOSIS — F41.1 GENERALIZED ANXIETY DISORDER: ICD-10-CM

## 2021-11-29 RX ORDER — CLONAZEPAM 1 MG/1
TABLET ORAL
Qty: 60 TABLET | Refills: 0 | Status: SHIPPED | OUTPATIENT
Start: 2021-11-29 | End: 2021-12-24

## 2021-12-22 ENCOUNTER — TELEPHONE (OUTPATIENT)
Dept: FAMILY MEDICINE | Facility: CLINIC | Age: 51
End: 2021-12-22
Payer: COMMERCIAL

## 2021-12-22 NOTE — TELEPHONE ENCOUNTER
Reason for Call:  Other appointment    Detailed comments: Pt wants to see provider ASAP for infected ingrown hair in grown area.     Phone Number Patient can be reached at: Home number on file [phone] (home)    Best Time: anytime    Can we leave a detailed message on this number? YES    Call taken on 12/22/2021 at 4:07 PM by Lizz Hinson

## 2021-12-23 NOTE — TELEPHONE ENCOUNTER
This can be a virtual visit.  Otherwise patient can call in the morning to obtain a same-day slot, it is not an urgent concern that I would book a same day slot in advance.   Thank you,  JULI Bonilla, NP-C  [facility]

## 2021-12-24 DIAGNOSIS — F31.9 BIPOLAR AFFECTIVE DISORDER, REMISSION STATUS UNSPECIFIED (H): ICD-10-CM

## 2021-12-24 DIAGNOSIS — F41.1 GENERALIZED ANXIETY DISORDER: ICD-10-CM

## 2021-12-24 RX ORDER — CLONAZEPAM 1 MG/1
TABLET ORAL
Qty: 60 TABLET | Refills: 0 | Status: SHIPPED | OUTPATIENT
Start: 2021-12-24 | End: 2022-01-24

## 2021-12-31 ENCOUNTER — VIRTUAL VISIT (OUTPATIENT)
Dept: FAMILY MEDICINE | Facility: CLINIC | Age: 51
End: 2021-12-31
Payer: COMMERCIAL

## 2021-12-31 DIAGNOSIS — L73.1 INGROWN HAIR: Primary | ICD-10-CM

## 2021-12-31 DIAGNOSIS — F41.1 GENERALIZED ANXIETY DISORDER: ICD-10-CM

## 2021-12-31 DIAGNOSIS — F31.9 BIPOLAR AFFECTIVE DISORDER, REMISSION STATUS UNSPECIFIED (H): ICD-10-CM

## 2021-12-31 PROCEDURE — 99213 OFFICE O/P EST LOW 20 MIN: CPT | Mod: 95 | Performed by: FAMILY MEDICINE

## 2021-12-31 NOTE — PROGRESS NOTES
Jose Luis is a 51 year old who is being evaluated via a billable video visit.      How would you like to obtain your AVS? MyChart  If the video visit is dropped, the invitation should be resent by: Text to cell phone: 1  Will anyone else be joining your video visit? No      Video Start Time: 0804    Assessment & Plan     Ingrown hair  In his pubic area.  Patient does shave down there.  Since scheduling appointment he was actually able to drain it and there is no surrounding erythema and it is getting less sore.  So we discussed overall care and skin hygiene.    Bipolar affective disorder, remission status unspecified (H)  Stable on current regimen.  Continue same plan and routine follow-up.     Generalized anxiety disorder  Stable on current regimen.  Continue same plan and routine follow-up.          See Patient Instructions    Return in about 3 months (around 3/31/2022) for Follow up of Chronic Issues, In Office or Video.    Tonie Cui MD  [facility]   Jose Luis is a 51 year old who presents for the following health issues     HPI     Visit with patient today originally scheduled to talk about ingrown hair but that is since resolved.  Follow-up on anxiety.  Doing well.  Reports no interval health concerns.      Review of Systems   Constitutional, HEENT, cardiovascular, pulmonary, gi and gu systems are negative, except as otherwise noted.      Objective           Vitals:  No vitals were obtained today due to virtual visit.    Physical Exam   GENERAL: Healthy, alert and no distress  EYES: Eyes grossly normal to inspection.  No discharge or erythema, or obvious scleral/conjunctival abnormalities.  RESP: No audible wheeze, cough, or visible cyanosis.  No visible retractions or increased work of breathing.    SKIN: Visible skin clear. No significant rash, abnormal pigmentation or lesions.  NEURO: Cranial nerves grossly intact.  Mentation and speech appropriate for age.  PSYCH:  Mentation appears normal, affect normal/bright, judgement and insight intact, normal speech and appearance well-groomed.    Past labs reviewed with the patient.             Video-Visit Details    Type of service:  Video Visit    Video End Time:0809    Originating Location (pt. Location): Home    Distant Location (provider location):  [facility]     Platform used for Video Visit: Claude[employer]ine

## 2022-01-24 DIAGNOSIS — F41.1 GENERALIZED ANXIETY DISORDER: ICD-10-CM

## 2022-01-24 DIAGNOSIS — F31.9 BIPOLAR AFFECTIVE DISORDER, REMISSION STATUS UNSPECIFIED (H): ICD-10-CM

## 2022-01-25 RX ORDER — CLONAZEPAM 1 MG/1
TABLET ORAL
Qty: 60 TABLET | Refills: 0 | Status: SHIPPED | OUTPATIENT
Start: 2022-01-25 | End: 2022-02-22

## 2022-02-21 DIAGNOSIS — F31.9 BIPOLAR AFFECTIVE DISORDER, REMISSION STATUS UNSPECIFIED (H): ICD-10-CM

## 2022-02-21 DIAGNOSIS — F41.1 GENERALIZED ANXIETY DISORDER: ICD-10-CM

## 2022-02-22 RX ORDER — CLONAZEPAM 1 MG/1
TABLET ORAL
Qty: 60 TABLET | Refills: 0 | Status: SHIPPED | OUTPATIENT
Start: 2022-02-23 | End: 2022-03-23

## 2022-03-21 DIAGNOSIS — F31.9 BIPOLAR AFFECTIVE DISORDER, REMISSION STATUS UNSPECIFIED (H): ICD-10-CM

## 2022-03-21 DIAGNOSIS — F41.1 GENERALIZED ANXIETY DISORDER: ICD-10-CM

## 2022-03-23 RX ORDER — CLONAZEPAM 1 MG/1
TABLET ORAL
Qty: 60 TABLET | Refills: 0 | Status: SHIPPED | OUTPATIENT
Start: 2022-03-23 | End: 2022-04-20

## 2022-03-23 NOTE — TELEPHONE ENCOUNTER
Routing refill request to provider for review/approval because:  Drug not on the [facility] refill protocol   Norma Negro RN, BSN   [facility]

## 2022-04-19 DIAGNOSIS — F41.1 GENERALIZED ANXIETY DISORDER: ICD-10-CM

## 2022-04-19 DIAGNOSIS — F31.9 BIPOLAR AFFECTIVE DISORDER, REMISSION STATUS UNSPECIFIED (H): ICD-10-CM

## 2022-04-20 RX ORDER — CLONAZEPAM 1 MG/1
TABLET ORAL
Qty: 60 TABLET | Refills: 0 | Status: SHIPPED | OUTPATIENT
Start: 2022-04-20 | End: 2022-05-19

## 2022-05-19 ENCOUNTER — TELEPHONE (OUTPATIENT)
Dept: FAMILY MEDICINE | Facility: CLINIC | Age: 52
End: 2022-05-19
Payer: COMMERCIAL

## 2022-06-01 ENCOUNTER — TELEPHONE (OUTPATIENT)
Dept: FAMILY MEDICINE | Facility: CLINIC | Age: 52
End: 2022-06-01
Payer: COMMERCIAL

## 2022-06-01 ENCOUNTER — VIRTUAL VISIT (OUTPATIENT)
Dept: FAMILY MEDICINE | Facility: CLINIC | Age: 52
End: 2022-06-01
Payer: COMMERCIAL

## 2022-06-01 DIAGNOSIS — F31.9 BIPOLAR AFFECTIVE DISORDER, REMISSION STATUS UNSPECIFIED (H): ICD-10-CM

## 2022-06-01 DIAGNOSIS — F19.20 CHEMICAL DEPENDENCY (H): ICD-10-CM

## 2022-06-01 DIAGNOSIS — U07.1 INFECTION DUE TO 2019 NOVEL CORONAVIRUS: Primary | ICD-10-CM

## 2022-06-01 PROCEDURE — 99214 OFFICE O/P EST MOD 30 MIN: CPT | Mod: 95 | Performed by: FAMILY MEDICINE

## 2022-06-01 RX ORDER — LORAZEPAM 1 MG/1
1 TABLET ORAL EVERY 6 HOURS PRN
Qty: 30 TABLET | Refills: 0 | Status: SHIPPED | OUTPATIENT
Start: 2022-06-01 | End: 2022-06-24

## 2022-06-01 NOTE — PROGRESS NOTES
Jose Luis is a 52 year old who is being evaluated via a billable telephone visit.      What phone number would you like to be contacted at? [phone]  How would you like to obtain your AVS? MyChart    Assessment & Plan     (U07.1) Infection due to 2019 novel coronavirus  (primary encounter diagnosis)  Comment: within 5 day window for paxlovid, high risk due to tobacco use/vaping  Plan: nirmatrelvir and ritonavir (PAXLOVID) therapy         pack        Reviewed that paxlovid will not necessarily make him better and is meant to decrease risk of severe illness.  There can be rebound symptoms 5-10 days after completing paxlovid. Med list reviewed for interactions.  Pt aware he cannot take clonazepam with paxlovid. Alternate med sent  Ok to continue finasteride    (F31.9) Bipolar affective disorder, remission status unspecified (H)  Comment: not on chronic med, clonazepam not as helpful as in past  Plan: LORazepam (ATIVAN) 1 MG tablet        Lorazepam sent for use during paxlovid use.    Pt has follow-up with pcp, he is interested in finding new med regimen and may benefit from psych collaborative care consult.    (F19.20) Chemical dependency (H)  Comment: h/o narcotic dependency  Plan: pt sober x 3 years or more.                See Patient Instructions    No follow-ups on file.    Waleska Rodriguez MD  [facility]    Subjective   Jose Luis is a 52 year old who presents for the following health issues     HPI   Colonoscopy abstracted- 5/4/2018  SUBJECTIVE:  Jose Luis Gonzáles is a 52 year old male who presents with the following concerns;              Symptoms: cc Present Absent Comment   Fever/Chills  x     Fatigue  x     Muscle Aches  x     Eye Irritation  x     Sneezing   x    Nasal Roby/Drg  x  Congestion    Sinus Pressure/Pain   x    Loss of smell   x    Dental pain  x  History of dental problems   Sore Throat   x    Swollen Glands   x    Ear Pain/Fullness   x    Cough  x  Occasional . History of  legionaires pneumonia- concerns with this moving    Wheeze   x    Chest Pain   x    Shortness of breath   x    Rash   x    Other   x      Symptom duration:  Tested positive Sunday with a bianax home test   Saturday night   Sympom severity:  Mild - improving    Treatments tried:  [employer]Inc, Gatorade, pedialite, emergen-c   Contacts:  unknown        Medications updated and reviewed.  Past, family and surgical history is updated and reviewed in the record.  ROS:  Other than noted above, general, HEENT, respiratory, cardiac and gastrointestinal systems are negative.        Objective           Vitals:  No vitals were obtained today due to virtual visit.    Physical Exam   healthy, alert and no distress  PSYCH: Alert and oriented times 3; coherent speech, normal   rate and volume, able to articulate logical thoughts, able   to abstract reason, no tangential thoughts, no hallucinations   or delusions  His affect is normal  RESP: No cough, no audible wheezing, able to talk in full sentences  Remainder of exam unable to be completed due to telephone visits                Phone call duration: 15 minutes

## 2022-06-01 NOTE — TELEPHONE ENCOUNTER
Patient called and is COVID positive. Has cold symptoms and no SOB. However, he has a significant lung history so would like to see if he qualifys for the antiviral medication to help lessen his symptoms.    Amee Cantu RN [facility]

## 2022-06-01 NOTE — PATIENT INSTRUCTIONS

## 2022-06-02 ENCOUNTER — TELEPHONE (OUTPATIENT)
Dept: FAMILY MEDICINE | Facility: CLINIC | Age: 52
End: 2022-06-02
Payer: COMMERCIAL

## 2022-06-02 NOTE — TELEPHONE ENCOUNTER
Prior Authorization Retail Medication Request    Medication/Dose: LORazepam (ATIVAN) 1 MG tablet  ICD code (if different than what is on RX):  Bipolar affective disorder, remission status unspecified (H) [F31.9]   Previously Tried and Failed:    Rationale:      Covermymeds Key: [id number]

## 2022-06-07 NOTE — TELEPHONE ENCOUNTER
PRIOR AUTHORIZATION DENIED    Medication: LORazepam (ATIVAN) 1 MG tablet-DENIED    Denial Date: 6/7/2022    Denial Rational: Patient can get 3 of the 1mg per day or can get 2 of the 2mg per day.        Appeal Information:

## 2022-06-07 NOTE — TELEPHONE ENCOUNTER
Central Prior Authorization Team   Phone: [phone]      PA Initiation    Medication: LORazepam (ATIVAN) 1 MG tablet  Insurance Company: [employer] [facility] - Phone [phone] Fax [phone]  Pharmacy Filling the Rx: [id number] [place], [place] - [address]  Filling Pharmacy Phone: [phone]  Filling Pharmacy Fax:    Start Date: 6/7/2022

## 2022-06-24 DIAGNOSIS — F31.9 BIPOLAR AFFECTIVE DISORDER, REMISSION STATUS UNSPECIFIED (H): ICD-10-CM

## 2022-06-24 DIAGNOSIS — F41.1 GENERALIZED ANXIETY DISORDER: ICD-10-CM

## 2022-06-24 DIAGNOSIS — L64.9 MALE PATTERN BALDNESS: ICD-10-CM

## 2022-06-24 RX ORDER — CLONAZEPAM 1 MG/1
TABLET ORAL
Qty: 60 TABLET | Refills: 0 | Status: SHIPPED | OUTPATIENT
Start: 2022-06-24 | End: 2022-08-01

## 2022-06-24 RX ORDER — FINASTERIDE 5 MG/1
TABLET, FILM COATED ORAL
Qty: 30 TABLET | Refills: 5 | Status: SHIPPED | OUTPATIENT
Start: 2022-06-24 | End: 2023-03-06

## 2022-06-24 NOTE — TELEPHONE ENCOUNTER
"Patient is calling for Klonopin refill. Not active on med list.    \"I am not taking Ativan, it does nothing for me.  I have been on Klonopin for years.\"    Also requesting Proscar as he will run out before this June 27th appointment.    Jessica Hawk, Regine Rueda RN    "

## 2022-06-27 ENCOUNTER — VIRTUAL VISIT (OUTPATIENT)
Dept: FAMILY MEDICINE | Facility: CLINIC | Age: 52
End: 2022-06-27
Payer: COMMERCIAL

## 2022-06-27 DIAGNOSIS — F41.1 GENERALIZED ANXIETY DISORDER: Primary | ICD-10-CM

## 2022-06-27 DIAGNOSIS — F31.9 BIPOLAR AFFECTIVE DISORDER, REMISSION STATUS UNSPECIFIED (H): ICD-10-CM

## 2022-06-27 PROCEDURE — 99214 OFFICE O/P EST MOD 30 MIN: CPT | Mod: 95 | Performed by: FAMILY MEDICINE

## 2022-06-27 RX ORDER — ARIPIPRAZOLE 2 MG/1
2 TABLET ORAL AT BEDTIME
Qty: 30 TABLET | Refills: 0 | Status: SHIPPED | OUTPATIENT
Start: 2022-06-27 | End: 2022-08-02

## 2022-06-27 NOTE — PROGRESS NOTES
Jose Luis is a 52 year old who is being evaluated via a billable telephone visit.      What phone number would you like to be contacted at? [phone]  How would you like to obtain your AVS? [place]    Assessment & Plan     Generalized anxiety disorder  Patient has been on a stable dosage of clonazepam for some time which he primarily uses at night.  But has been struggling with a lot of anxiety during the day.  Patient has a lot of life stressors going on including a court case for custody of his children and lots of work.  In speaking with him he is not displaying pressured speech per se but there is certainly a larger volume and I suspect that things have moved towards a manic range in response to all of his stressors going on.  So he is working on some self-care options.  He would like to get set up with a counselor.  We talked about using clonazepam a small amount during the day in addition to his dosage at night but I suggested adding Abilify to his regimen, possibly just at bedtime.  This may help bring his mood back down to a more normal level.  We certainly have plenty of other options if needed as well.  - Adult Mental Health  Referral; Future  - ARIPiprazole (ABILIFY) 2 MG tablet; Take 1 tablet (2 mg) by mouth At Bedtime    Bipolar affective disorder, remission status unspecified (H)  As above  - Adult Mental Health  Referral; Future  - ARIPiprazole (ABILIFY) 2 MG tablet; Take 1 tablet (2 mg) by mouth At Bedtime    Reviewed interval history with patient and in his chart.     See Patient Instructions    Return in about 3 months (around 9/27/2022) for Routine preventive, in office.    Tonie Cui MD  [facility]   Jose Luis is a 52 year old, presenting for the following health issues:  Recheck Medication      HPI     -Recheck medications (klonopin not helping as much as it should)    Visit with patient today to talk about anxiety and bipolar  disorder.  Just more on edge lately.  Working very long hours and has an upcoming court case.    Review of Systems   Constitutional, HEENT, cardiovascular, pulmonary, gi and gu systems are negative, except as otherwise noted.      Objective           Vitals:  No vitals were obtained today due to virtual visit.    Physical Exam   healthy, alert and no distress  PSYCH: Alert and oriented times 3; coherent speech, normal   rate and volume, able to articulate logical thoughts, able   to abstract reason, no tangential thoughts, no hallucinations   or delusions  His affect is normal  RESP: No cough, no audible wheezing, able to talk in full sentences  Remainder of exam unable to be completed due to telephone visits    Past labs reviewed with the patient.             Phone call duration: 27 minutes    .  ..

## 2022-08-01 ENCOUNTER — TELEPHONE (OUTPATIENT)
Dept: FAMILY MEDICINE | Facility: CLINIC | Age: 52
End: 2022-08-01

## 2022-08-01 DIAGNOSIS — F31.9 BIPOLAR AFFECTIVE DISORDER, REMISSION STATUS UNSPECIFIED (H): ICD-10-CM

## 2022-08-01 DIAGNOSIS — F41.1 GENERALIZED ANXIETY DISORDER: ICD-10-CM

## 2022-08-01 RX ORDER — CLONAZEPAM 1 MG/1
TABLET ORAL
Qty: 60 TABLET | Refills: 0 | Status: SHIPPED | OUTPATIENT
Start: 2022-08-01 | End: 2022-08-30

## 2022-08-01 NOTE — TELEPHONE ENCOUNTER
Pt called to get a status on his clonazepam. Is also requesting to have a refill sent of Abilify.      Keena Riggs RN  [facility]

## 2022-08-01 NOTE — TELEPHONE ENCOUNTER
Refill sent.   Updated med list by taking paxlovid off since patient would not be on that anymore and there is a severe drug-drug interaction between that and clonazepam.    JULI Bonilla, NP-C  [facility]

## 2022-08-02 RX ORDER — ARIPIPRAZOLE 2 MG/1
2 TABLET ORAL AT BEDTIME
Qty: 30 TABLET | Refills: 0 | Status: SHIPPED | OUTPATIENT
Start: 2022-08-02 | End: 2022-08-31

## 2022-08-02 NOTE — TELEPHONE ENCOUNTER
Routing refill request to provider for review/approval because:  Labs not current:  Lipid Panel

## 2022-08-02 NOTE — TELEPHONE ENCOUNTER
Clonazepam was filled yesterday by Delphine Abebe not up to date- schedule face to face  visit with Dr. Cui prior to additional refills  abilify refilled for one month  Was to schedule routine preventative in the office approximately 9/27/22  Assist with scheduling follow up with Dr. Cui

## 2022-08-30 DIAGNOSIS — F41.1 GENERALIZED ANXIETY DISORDER: ICD-10-CM

## 2022-08-30 DIAGNOSIS — F31.9 BIPOLAR AFFECTIVE DISORDER, REMISSION STATUS UNSPECIFIED (H): ICD-10-CM

## 2022-08-31 RX ORDER — CLONAZEPAM 1 MG/1
TABLET ORAL
Qty: 60 TABLET | Refills: 0 | Status: SHIPPED | OUTPATIENT
Start: 2022-08-31 | End: 2022-09-30

## 2022-08-31 RX ORDER — ARIPIPRAZOLE 2 MG/1
2 TABLET ORAL AT BEDTIME
Qty: 30 TABLET | Refills: 2 | Status: SHIPPED | OUTPATIENT
Start: 2022-08-31 | End: 2023-03-06

## 2022-09-30 DIAGNOSIS — F41.1 GENERALIZED ANXIETY DISORDER: ICD-10-CM

## 2022-09-30 DIAGNOSIS — F31.9 BIPOLAR AFFECTIVE DISORDER, REMISSION STATUS UNSPECIFIED (H): ICD-10-CM

## 2022-09-30 RX ORDER — CLONAZEPAM 1 MG/1
TABLET ORAL
Qty: 60 TABLET | Refills: 0 | Status: SHIPPED | OUTPATIENT
Start: 2022-09-30 | End: 2022-11-01

## 2022-11-01 DIAGNOSIS — F31.9 BIPOLAR AFFECTIVE DISORDER, REMISSION STATUS UNSPECIFIED (H): ICD-10-CM

## 2022-11-01 DIAGNOSIS — F41.1 GENERALIZED ANXIETY DISORDER: ICD-10-CM

## 2022-11-01 RX ORDER — CLONAZEPAM 1 MG/1
TABLET ORAL
Qty: 60 TABLET | Refills: 0 | Status: SHIPPED | OUTPATIENT
Start: 2022-11-01 | End: 2022-11-30

## 2022-11-30 DIAGNOSIS — F31.9 BIPOLAR AFFECTIVE DISORDER, REMISSION STATUS UNSPECIFIED (H): ICD-10-CM

## 2022-11-30 DIAGNOSIS — F41.1 GENERALIZED ANXIETY DISORDER: ICD-10-CM

## 2022-11-30 RX ORDER — CLONAZEPAM 1 MG/1
TABLET ORAL
Qty: 60 TABLET | Refills: 0 | Status: SHIPPED | OUTPATIENT
Start: 2022-11-30 | End: 2023-01-02

## 2022-11-30 NOTE — TELEPHONE ENCOUNTER
Pt called and wanted to make sure we received the refill request for clonazepam. Informed pt that request just came in a few minutes ago and has been forwarded onto the provider to review.  Tania Sandhu RN  [facility]

## 2022-12-14 NOTE — TELEPHONE ENCOUNTER
..Reason for Call:    call back    Detailed comments: Patient states the letter is needed for SSI/due to showing he has assets(which he is trying to resolve or fight(per Patient), its a contract for deed/his father/and father is still living);     Phone Number Patient can be reached at: Home number on file [phone] (home)    Best Time: anytime    Can we leave a detailed message on this number? YES    Call taken on 3/16/2018 at 8:49 AM by Clara Krueger       Double O-Z Plasty Text: The defect edges were debeveled with a #15 scalpel blade.  Given the location of the defect, shape of the defect and the proximity to free margins a Double O-Z plasty (double transposition flap) was deemed most appropriate.  Using a sterile surgical marker, the appropriate transposition flaps were drawn incorporating the defect and placing the expected incisions within the relaxed skin tension lines where possible. The area thus outlined was incised deep to adipose tissue with a #15 scalpel blade.  The skin margins were undermined to an appropriate distance in all directions utilizing iris scissors.  Hemostasis was achieved with electrocautery.  The flaps were then transposed into place, one clockwise and the other counterclockwise, and anchored with interrupted buried subcutaneous sutures.

## 2023-01-02 DIAGNOSIS — F41.1 GENERALIZED ANXIETY DISORDER: ICD-10-CM

## 2023-01-02 DIAGNOSIS — F31.9 BIPOLAR AFFECTIVE DISORDER, REMISSION STATUS UNSPECIFIED (H): ICD-10-CM

## 2023-01-02 RX ORDER — CLONAZEPAM 1 MG/1
TABLET ORAL
Qty: 60 TABLET | Refills: 0 | Status: SHIPPED | OUTPATIENT
Start: 2023-01-02 | End: 2023-01-31

## 2023-01-31 DIAGNOSIS — F41.1 GENERALIZED ANXIETY DISORDER: ICD-10-CM

## 2023-01-31 DIAGNOSIS — F31.9 BIPOLAR AFFECTIVE DISORDER, REMISSION STATUS UNSPECIFIED (H): ICD-10-CM

## 2023-01-31 RX ORDER — CLONAZEPAM 1 MG/1
TABLET ORAL
Qty: 60 TABLET | Refills: 0 | Status: SHIPPED | OUTPATIENT
Start: 2023-01-31 | End: 2023-03-03

## 2023-03-03 ENCOUNTER — TELEPHONE (OUTPATIENT)
Dept: FAMILY MEDICINE | Facility: CLINIC | Age: 53
End: 2023-03-03
Payer: COMMERCIAL

## 2023-03-03 DIAGNOSIS — F41.1 GENERALIZED ANXIETY DISORDER: ICD-10-CM

## 2023-03-03 DIAGNOSIS — F31.9 BIPOLAR AFFECTIVE DISORDER, REMISSION STATUS UNSPECIFIED (H): ICD-10-CM

## 2023-03-03 RX ORDER — CLONAZEPAM 1 MG/1
1 TABLET ORAL 3 TIMES DAILY PRN
Qty: 60 TABLET | Refills: 0 | Status: SHIPPED | OUTPATIENT
Start: 2023-03-03 | End: 2023-04-02

## 2023-03-03 RX ORDER — CLONAZEPAM 1 MG/1
TABLET ORAL
Qty: 60 TABLET | OUTPATIENT
Start: 2023-03-03

## 2023-03-03 NOTE — TELEPHONE ENCOUNTER
Patient came in clinic today regarding medication refills. Patient's pharmacy had stated that the medication refill was denied and patient is unsure why- would like to speak to someone on Dr. Cui's care team about it.     Please contact patient by phone @ [phone]

## 2023-03-03 NOTE — CONFIDENTIAL NOTE
Refill denied to the pharmacy.   If patient schedules an appointment we can provide a conchita refill.

## 2023-03-06 ENCOUNTER — VIRTUAL VISIT (OUTPATIENT)
Dept: FAMILY MEDICINE | Facility: CLINIC | Age: 53
End: 2023-03-06
Payer: COMMERCIAL

## 2023-03-06 DIAGNOSIS — F31.9 BIPOLAR AFFECTIVE DISORDER, REMISSION STATUS UNSPECIFIED (H): ICD-10-CM

## 2023-03-06 DIAGNOSIS — F19.20 CHEMICAL DEPENDENCY (H): ICD-10-CM

## 2023-03-06 DIAGNOSIS — F41.1 GENERALIZED ANXIETY DISORDER: Primary | ICD-10-CM

## 2023-03-06 PROCEDURE — 99214 OFFICE O/P EST MOD 30 MIN: CPT | Mod: 93 | Performed by: FAMILY MEDICINE

## 2023-03-06 NOTE — PROGRESS NOTES
Jose Luis is a 52 year old who is being evaluated via a billable telephone visit.      What phone number would you like to be contacted at? [phone]   How would you like to obtain your AVS? Mail a copy    Distant Location (provider location):  Off-site    Assessment & Plan     Generalized anxiety disorder  Stable on current regimen.  Continue same plan and routine follow-up.     Bipolar affective disorder, remission status unspecified (H)  Actually doing fairly well.  Is not on any therapy currently but does not think that Abilify was needed any longer.  So we will continue to follow.    Chemical dependency (H)  Stable and maintaining sobriety.         See Patient Instructions    Return in about 6 months (around 9/6/2023) for follow up on this issue, In Office or Video.    Tonie Cui MD  [facility]   Jose Luis is a 52 year old, presenting for the following health issues:  Refill Request      History of Present Illness       Reason for visit:  Medication refill    He eats 0-1 servings of fruits and vegetables daily.He consumes 0 sweetened beverage(s) daily.He exercises with enough effort to increase his heart rate 60 or more minutes per day.  He exercises with enough effort to increase his heart rate 7 days per week.   He is taking medications regularly.       Visit with patient today in follow-up of anxiety.  Has done fairly well lately.  He is back working as a boat salesman and things are generally going well overall.    Review of Systems   Constitutional, HEENT, cardiovascular, pulmonary, gi and gu systems are negative, except as otherwise noted.      Objective           Vitals:  No vitals were obtained today due to virtual visit.    Physical Exam   healthy, alert and no distress  PSYCH: Alert and oriented times 3; coherent speech, normal   rate and volume, able to articulate logical thoughts, able   to abstract reason, no tangential thoughts, no hallucinations   or  delusions  His affect is normal  RESP: No cough, no audible wheezing, able to talk in full sentences  Remainder of exam unable to be completed due to telephone visits    Past labs reviewed with the patient.             Phone call duration: 9 minutes

## 2023-03-31 DIAGNOSIS — F41.1 GENERALIZED ANXIETY DISORDER: ICD-10-CM

## 2023-03-31 DIAGNOSIS — F31.9 BIPOLAR AFFECTIVE DISORDER, REMISSION STATUS UNSPECIFIED (H): ICD-10-CM

## 2023-04-02 RX ORDER — CLONAZEPAM 1 MG/1
TABLET ORAL
Qty: 60 TABLET | Refills: 0 | Status: SHIPPED | OUTPATIENT
Start: 2023-04-03 | End: 2023-05-03

## 2023-05-03 DIAGNOSIS — F41.1 GENERALIZED ANXIETY DISORDER: ICD-10-CM

## 2023-05-03 DIAGNOSIS — F31.9 BIPOLAR AFFECTIVE DISORDER, REMISSION STATUS UNSPECIFIED (H): ICD-10-CM

## 2023-05-03 RX ORDER — CLONAZEPAM 1 MG/1
TABLET ORAL
Qty: 60 TABLET | Refills: 0 | Status: SHIPPED | OUTPATIENT
Start: 2023-05-03 | End: 2023-06-01

## 2023-05-31 DIAGNOSIS — F41.1 GENERALIZED ANXIETY DISORDER: ICD-10-CM

## 2023-05-31 DIAGNOSIS — F31.9 BIPOLAR AFFECTIVE DISORDER, REMISSION STATUS UNSPECIFIED (H): ICD-10-CM

## 2023-06-01 RX ORDER — CLONAZEPAM 1 MG/1
TABLET ORAL
Qty: 60 TABLET | Refills: 0 | Status: SHIPPED | OUTPATIENT
Start: 2023-06-01 | End: 2023-07-03

## 2023-07-03 ENCOUNTER — TELEPHONE (OUTPATIENT)
Dept: FAMILY MEDICINE | Facility: CLINIC | Age: 53
End: 2023-07-03
Payer: COMMERCIAL

## 2023-07-03 DIAGNOSIS — F41.1 GENERALIZED ANXIETY DISORDER: ICD-10-CM

## 2023-07-03 DIAGNOSIS — F31.9 BIPOLAR AFFECTIVE DISORDER, REMISSION STATUS UNSPECIFIED (H): ICD-10-CM

## 2023-07-03 RX ORDER — CLONAZEPAM 1 MG/1
TABLET ORAL
Qty: 60 TABLET | Refills: 0 | Status: SHIPPED | OUTPATIENT
Start: 2023-07-03 | End: 2023-08-03

## 2023-07-03 NOTE — TELEPHONE ENCOUNTER
[facility]   prescription refilled   Last visit with Dr. Cui 3/6/23- follow up with Dr. Cui due in September face to face visit - assist with scheduling

## 2023-08-03 DIAGNOSIS — F41.1 GENERALIZED ANXIETY DISORDER: ICD-10-CM

## 2023-08-03 DIAGNOSIS — F31.9 BIPOLAR AFFECTIVE DISORDER, REMISSION STATUS UNSPECIFIED (H): ICD-10-CM

## 2023-08-03 RX ORDER — CLONAZEPAM 1 MG/1
TABLET ORAL
Qty: 60 TABLET | Refills: 0 | Status: SHIPPED | OUTPATIENT
Start: 2023-08-03 | End: 2023-09-01

## 2023-08-03 NOTE — TELEPHONE ENCOUNTER
[facility]   Last filled 7/3/23  Has appointment with PCP next month- prescription refilled.

## 2023-09-01 ENCOUNTER — OFFICE VISIT (OUTPATIENT)
Dept: FAMILY MEDICINE | Facility: CLINIC | Age: 53
End: 2023-09-01
Payer: COMMERCIAL

## 2023-09-01 VITALS
HEART RATE: 74 BPM | HEIGHT: 70 IN | SYSTOLIC BLOOD PRESSURE: 134 MMHG | OXYGEN SATURATION: 98 % | DIASTOLIC BLOOD PRESSURE: 82 MMHG | RESPIRATION RATE: 16 BRPM | TEMPERATURE: 97.8 F | BODY MASS INDEX: 20.53 KG/M2 | WEIGHT: 143.4 LBS

## 2023-09-01 DIAGNOSIS — Z13.1 DIABETES MELLITUS SCREENING: ICD-10-CM

## 2023-09-01 DIAGNOSIS — E29.1 MALE HYPOGONADISM: ICD-10-CM

## 2023-09-01 DIAGNOSIS — Z13.220 SCREENING CHOLESTEROL LEVEL: ICD-10-CM

## 2023-09-01 DIAGNOSIS — Z12.5 PROSTATE CANCER SCREENING: ICD-10-CM

## 2023-09-01 DIAGNOSIS — F31.9 BIPOLAR AFFECTIVE DISORDER, REMISSION STATUS UNSPECIFIED (H): ICD-10-CM

## 2023-09-01 DIAGNOSIS — F41.1 GENERALIZED ANXIETY DISORDER: Primary | ICD-10-CM

## 2023-09-01 PROCEDURE — 99214 OFFICE O/P EST MOD 30 MIN: CPT | Performed by: FAMILY MEDICINE

## 2023-09-01 RX ORDER — CLONAZEPAM 1 MG/1
1 TABLET ORAL 3 TIMES DAILY PRN
Qty: 60 TABLET | Refills: 2 | Status: SHIPPED | OUTPATIENT
Start: 2023-09-01 | End: 2023-10-03

## 2023-09-01 ASSESSMENT — PAIN SCALES - GENERAL: PAINLEVEL: NO PAIN (0)

## 2023-09-01 NOTE — PROGRESS NOTES
"  Assessment & Plan     Generalized anxiety disorder  Stable on current regimen.  Generally doing well overall.  Plan follow-up in about 6 months.  - clonazePAM (KLONOPIN) 1 MG tablet; Take 1 tablet (1 mg) by mouth 3 times daily as needed for anxiety  - TSH with free T4 reflex; Future    Bipolar affective disorder, remission status unspecified (H)  As above  - clonazePAM (KLONOPIN) 1 MG tablet; Take 1 tablet (1 mg) by mouth 3 times daily as needed for anxiety    Male hypogonadism  We have not recheck levels in a number of years and I like him to have some upcoming lab work  - TSH with free T4 reflex; Future  - Testosterone total; Future    Screening cholesterol level    - Lipid panel reflex to direct LDL Fasting; Future    Diabetes mellitus screening    - Glucose; Future    Prostate cancer screening  - Prostate Specific Antigen Screen; Future       See Patient Instructions    Tonie Cui MD  [facility]    Raysa Amaya is a 53 year old, presenting for the following health issues:  Heart Problem (Pounding and fluttery in the chest for a few months )        9/1/2023     3:26 PM   Additional Questions   Roomed by Gissell OLIVER   Accompanied by self       History of Present Illness       Reason for visit:  Check    He eats 2-3 servings of fruits and vegetables daily.He consumes 4 sweetened beverage(s) daily.He exercises with enough effort to increase his heart rate 60 or more minutes per day.  He exercises with enough effort to increase his heart rate 7 days per week. He is missing 2 dose(s) of medications per week.         Here today just for routine follow-up of anxiety.  Lost his father earlier this year and we discussed routine health measures.      Review of Systems   Constitutional, HEENT, cardiovascular, pulmonary, gi and gu systems are negative, except as otherwise noted.      Objective    /82   Pulse 74   Temp 97.8  F (36.6  C) (Oral)   Resp 16   Ht 1.778 m (5' 10\")   " Wt 65 kg (143 lb 6.4 oz)   SpO2 98%   BMI 20.58 kg/m    Body mass index is 20.58 kg/m .  Physical Exam   Alert, pleasant, upbeat, and in no apparent discomfort.  S1 and S2 normal, no murmurs, clicks, gallops or rubs. Regular rate and rhythm. Chest is clear; no wheezes or rales. No edema or JVD.  Past labs reviewed with the patient.

## 2023-09-21 NOTE — TELEPHONE ENCOUNTER
Please re fax back to Jenny's fax. Her printer was out of ink. It is fixed now, thank you.  [facility]  2nd Floor  Primary Care     room air

## 2023-10-03 DIAGNOSIS — F41.1 GENERALIZED ANXIETY DISORDER: ICD-10-CM

## 2023-10-03 DIAGNOSIS — F31.9 BIPOLAR AFFECTIVE DISORDER, REMISSION STATUS UNSPECIFIED (H): ICD-10-CM

## 2023-10-03 RX ORDER — CLONAZEPAM 1 MG/1
1 TABLET ORAL 3 TIMES DAILY PRN
Qty: 60 TABLET | Refills: 0 | Status: SHIPPED | OUTPATIENT
Start: 2023-10-03 | End: 2023-12-29

## 2023-11-08 NOTE — TELEPHONE ENCOUNTER
pcp out of office. Will review on his return.    Detail Level: Detailed Size Of Lesion In Cm (Optional): 0 Introduction Text (Please End With A Colon): The following procedure was deferred:

## 2023-12-29 DIAGNOSIS — F31.9 BIPOLAR AFFECTIVE DISORDER, REMISSION STATUS UNSPECIFIED (H): ICD-10-CM

## 2023-12-29 DIAGNOSIS — F41.1 GENERALIZED ANXIETY DISORDER: ICD-10-CM

## 2023-12-29 RX ORDER — CLONAZEPAM 1 MG/1
1 TABLET ORAL 3 TIMES DAILY PRN
Qty: 60 TABLET | Refills: 0 | Status: SHIPPED | OUTPATIENT
Start: 2023-12-29 | End: 2024-02-01

## 2023-12-29 NOTE — TELEPHONE ENCOUNTER
Medication Question or Refill    Contacts         Type Contact Phone/Fax    12/29/2023 04:49 PM CST Interface (Incoming) [id number] [place] [address] [address] [facility] [facility] [address] [facility] [address] [phone]            What medication are you calling about (include dose and sig)?: Clonazepam    Preferred Pharmacy:  [id number] [place] [facility] [address] [address] [facility] AT [facility] [address] ([place] [address] [address] [facility] [address]  Phone: [phone] Fax: [phone]      Controlled Substance Agreement on file:   CSA -- Patient Level:    CSA: None found at the patient level.       Who prescribed the medication?: Tonie Cui        PT requesting prescription before Saturday due his going out of state.

## 2024-01-31 DIAGNOSIS — F31.9 BIPOLAR AFFECTIVE DISORDER, REMISSION STATUS UNSPECIFIED (H): ICD-10-CM

## 2024-01-31 DIAGNOSIS — F41.1 GENERALIZED ANXIETY DISORDER: ICD-10-CM

## 2024-02-01 RX ORDER — CLONAZEPAM 1 MG/1
1 TABLET ORAL 3 TIMES DAILY PRN
Qty: 60 TABLET | Refills: 0 | Status: SHIPPED | OUTPATIENT
Start: 2024-02-01 | End: 2024-02-22

## 2024-02-22 DIAGNOSIS — F31.9 BIPOLAR AFFECTIVE DISORDER, REMISSION STATUS UNSPECIFIED (H): ICD-10-CM

## 2024-02-22 DIAGNOSIS — F41.1 GENERALIZED ANXIETY DISORDER: ICD-10-CM

## 2024-02-22 RX ORDER — CLONAZEPAM 1 MG/1
1 TABLET ORAL 3 TIMES DAILY PRN
Qty: 60 TABLET | Refills: 0 | Status: SHIPPED | OUTPATIENT
Start: 2024-02-22 | End: 2024-03-28

## 2024-02-22 NOTE — TELEPHONE ENCOUNTER
Joslyn Bales, Joslyn Marksnp ~ [facility] Clinical Support Pool9 hours ago (7:33 AM)       I entered the orders that he dropped off. If he needs an EGD, he has to see GI and they will order it. I can not just order the test. He is also do for office follow up      Reason for Call:  Other call back    Detailed comments: Valerie is calling from [facility], ph. #[phone] wanting to f/u on message below: when pharmacy should proceed to refill medication.//Please advise. Thank you.    Phone Number Patient can be reached at: Other phone number: [phone]    Best Time: Anytime    Can we leave a detailed message on this number? YES    Call taken on 1/18/2017 at 2:10 PM by Mele Jackman

## 2024-02-22 NOTE — TELEPHONE ENCOUNTER
Medication Question or Refill    Patient is losing his insurance at the end of February. He has a lab appointment on Sat. 2/24 and was hoping to get a refill + 1 more by 2/29. He is trying to get back to state insurance/.    What medication are you calling about (include dose and sig)?: lonazePAM (KLONOPIN) 1 MG tablet     Preferred Pharmacy:  [facility][id number] [place] [address] [facility] [address]  [address] [facility] [address]  Phone: [phone] Fax: [phone]      Controlled Substance Agreement on file:   CSA -- Patient Level:    CSA: None found at the patient level.       Who prescribed the medication?: Dr. Cui    Do you need a refill? Yes    When did you use the medication last? daily    Patient offered an appointment? No - Patient has a lab appt on Sat 2/24        Could we send this information to you in [facility] or would you prefer to receive a phone call?:   Patient would prefer a phone call   Okay to leave a detailed message?: Yes at Cell number on file:    Telephone Information:   Mobile [phone]

## 2024-02-24 ENCOUNTER — LAB (OUTPATIENT)
Dept: LAB | Facility: CLINIC | Age: 54
End: 2024-02-24
Payer: COMMERCIAL

## 2024-02-24 DIAGNOSIS — E29.1 MALE HYPOGONADISM: ICD-10-CM

## 2024-02-24 DIAGNOSIS — F41.1 GENERALIZED ANXIETY DISORDER: ICD-10-CM

## 2024-02-24 DIAGNOSIS — Z13.1 DIABETES MELLITUS SCREENING: ICD-10-CM

## 2024-02-24 DIAGNOSIS — Z12.5 PROSTATE CANCER SCREENING: ICD-10-CM

## 2024-02-24 DIAGNOSIS — Z13.220 SCREENING CHOLESTEROL LEVEL: ICD-10-CM

## 2024-02-24 LAB
CHOLEST SERPL-MCNC: 166 MG/DL
FASTING STATUS PATIENT QL REPORTED: NO
FASTING STATUS PATIENT QL REPORTED: NO
GLUCOSE SERPL-MCNC: 85 MG/DL (ref 70–99)
HDLC SERPL-MCNC: 54 MG/DL
LDLC SERPL CALC-MCNC: 100 MG/DL
NONHDLC SERPL-MCNC: 112 MG/DL
PSA SERPL DL<=0.01 NG/ML-MCNC: 1.21 NG/ML (ref 0–3.5)
TRIGL SERPL-MCNC: 61 MG/DL
TSH SERPL DL<=0.005 MIU/L-ACNC: 0.9 UIU/ML (ref 0.3–4.2)

## 2024-02-24 PROCEDURE — 36415 COLL VENOUS BLD VENIPUNCTURE: CPT

## 2024-02-24 PROCEDURE — 84403 ASSAY OF TOTAL TESTOSTERONE: CPT

## 2024-02-24 PROCEDURE — 80061 LIPID PANEL: CPT

## 2024-02-24 PROCEDURE — 82947 ASSAY GLUCOSE BLOOD QUANT: CPT

## 2024-02-24 PROCEDURE — 84443 ASSAY THYROID STIM HORMONE: CPT

## 2024-02-24 PROCEDURE — G0103 PSA SCREENING: HCPCS

## 2024-02-27 ENCOUNTER — TELEPHONE (OUTPATIENT)
Dept: FAMILY MEDICINE | Facility: CLINIC | Age: 54
End: 2024-02-27
Payer: COMMERCIAL

## 2024-02-27 LAB — TESTOST SERPL-MCNC: 631 NG/DL (ref 240–950)

## 2024-02-27 NOTE — TELEPHONE ENCOUNTER
Reason for Call:  Request for results:    Name of test or procedure: was taken on 02/24    Date of test of procedure: labs    Location of the test or procedure: [facility]    OK to leave the result message on voice mail or with a family member? YES    Phone number Patient can be reached at:  Cell number on file:    Telephone Information:   Mobile [phone]       Additional comments:  n/A    Call taken on 2/27/2024 at 4:36 PM by KOLBY CULP

## 2024-03-01 ENCOUNTER — MYC MEDICAL ADVICE (OUTPATIENT)
Dept: FAMILY MEDICINE | Facility: CLINIC | Age: 54
End: 2024-03-01

## 2024-03-01 NOTE — TELEPHONE ENCOUNTER
Pt called into the clinic to apologize to PCP that he was not able to get his call for his virtual appt this morning. He said his phone did not work for some reason and when he got it working he had all the missed calls and messages. I offered to help pt reschedule but he declined and said he will call back Monday to reschedule. He just wanted to get a msg to provider to apologize about the missed appt this morning.

## 2024-03-25 ENCOUNTER — TELEPHONE (OUTPATIENT)
Dept: FAMILY MEDICINE | Facility: CLINIC | Age: 54
End: 2024-03-25
Payer: COMMERCIAL

## 2024-03-25 NOTE — TELEPHONE ENCOUNTER
Called back and spoke with patient. Reviewed labs with patient and answered his questions. Advised scheduling follow up visit with Dr. Cui soon if pt anticipates he will have concerns to speak with provider about. Pt verbalized understanding.    Tania Sandhu RN    [facility]- Primary Care

## 2024-03-25 NOTE — TELEPHONE ENCOUNTER
Test Results    Contacts         Type Contact Phone/Fax    03/25/2024 05:09 PM CDT Phone (Incoming) Jose Luis Gonzáles (Self) [phone] ()            Who ordered the test:  Dr Cui    Type of test: lab results from 2/24/2024    Where was the test performed:  [facility]    What are your questions/concerns?:  Patient needs to know the results. Patient tried to schedule an appointment with Dr Cui but he is booked way out Please advise.      Could we send this information to you in [email or web address] or would you prefer to receive a phone call?:   Patient would prefer a phone call   Okay to leave a detailed message?: Yes at Home number on file [phone] (home)    [facility]   Primary Care

## 2024-04-03 ENCOUNTER — OFFICE VISIT (OUTPATIENT)
Dept: FAMILY MEDICINE | Facility: CLINIC | Age: 54
End: 2024-04-03
Payer: COMMERCIAL

## 2024-04-03 VITALS
DIASTOLIC BLOOD PRESSURE: 82 MMHG | SYSTOLIC BLOOD PRESSURE: 113 MMHG | RESPIRATION RATE: 16 BRPM | TEMPERATURE: 97.7 F | HEART RATE: 95 BPM | WEIGHT: 149.56 LBS | BODY MASS INDEX: 20.94 KG/M2 | HEIGHT: 71 IN | OXYGEN SATURATION: 100 %

## 2024-04-03 DIAGNOSIS — E29.1 MALE HYPOGONADISM: ICD-10-CM

## 2024-04-03 DIAGNOSIS — F31.9 BIPOLAR AFFECTIVE DISORDER, REMISSION STATUS UNSPECIFIED (H): ICD-10-CM

## 2024-04-03 DIAGNOSIS — F19.20 CHEMICAL DEPENDENCY (H): ICD-10-CM

## 2024-04-03 DIAGNOSIS — F41.1 GENERALIZED ANXIETY DISORDER: Primary | ICD-10-CM

## 2024-04-03 PROCEDURE — 99214 OFFICE O/P EST MOD 30 MIN: CPT | Performed by: FAMILY MEDICINE

## 2024-04-03 RX ORDER — CLONAZEPAM 1 MG/1
1 TABLET ORAL 3 TIMES DAILY PRN
Qty: 60 TABLET | Refills: 0 | Status: SHIPPED | OUTPATIENT
Start: 2024-04-25 | End: 2024-05-23

## 2024-04-03 ASSESSMENT — ANXIETY QUESTIONNAIRES
5. BEING SO RESTLESS THAT IT IS HARD TO SIT STILL: NEARLY EVERY DAY
GAD7 TOTAL SCORE: 12
7. FEELING AFRAID AS IF SOMETHING AWFUL MIGHT HAPPEN: NOT AT ALL
2. NOT BEING ABLE TO STOP OR CONTROL WORRYING: SEVERAL DAYS
3. WORRYING TOO MUCH ABOUT DIFFERENT THINGS: SEVERAL DAYS
IF YOU CHECKED OFF ANY PROBLEMS ON THIS QUESTIONNAIRE, HOW DIFFICULT HAVE THESE PROBLEMS MADE IT FOR YOU TO DO YOUR WORK, TAKE CARE OF THINGS AT HOME, OR GET ALONG WITH OTHER PEOPLE: SOMEWHAT DIFFICULT
1. FEELING NERVOUS, ANXIOUS, OR ON EDGE: NEARLY EVERY DAY
GAD7 TOTAL SCORE: 12
6. BECOMING EASILY ANNOYED OR IRRITABLE: SEVERAL DAYS

## 2024-04-03 ASSESSMENT — PAIN SCALES - GENERAL: PAINLEVEL: MODERATE PAIN (5)

## 2024-04-03 ASSESSMENT — PATIENT HEALTH QUESTIONNAIRE - PHQ9: 5. POOR APPETITE OR OVEREATING: NEARLY EVERY DAY

## 2024-04-03 NOTE — PROGRESS NOTES
"  Assessment & Plan     Generalized anxiety disorder  Stable on current regimen and monitoring.  - clonazePAM (KLONOPIN) 1 MG tablet; Take 1 tablet (1 mg) by mouth 3 times daily as needed for anxiety    Bipolar affective disorder, remission status unspecified (H)  Patient feels more imbalance lately than he has in years.  Will continue to follow-up  - clonazePAM (KLONOPIN) 1 MG tablet; Take 1 tablet (1 mg) by mouth 3 times daily as needed for anxiety    Chemical dependency (H)  Sober times years and we are continuing to follow along    Male hypogonadism  Testosterone therapy through an outside clinic.  Numbers have been very good        Regular exercise  Talked about dietary measures to help increase lean body mass and health in general  See Patient Instructions    Subjective   Jose Luis is a 53 year old, presenting for the following health issues:  Recheck Medication        4/3/2024     2:22 PM   Additional Questions   Roomed by Diane Lynne Schoenherr RN     History of Present Illness       Reason for visit:  Med check    He eats 2-3 servings of fruits and vegetables daily.He consumes 3 sweetened beverage(s) daily.He exercises with enough effort to increase his heart rate 60 or more minutes per day.  He exercises with enough effort to increase his heart rate 7 days per week. He is missing 2 dose(s) of medications per week.           Here today in follow-up of ongoing medical issues and just making sure things are okay      Review of Systems  Constitutional, HEENT, cardiovascular, pulmonary, gi and gu systems are negative, except as otherwise noted.      Objective    /82 (BP Location: Right arm, Patient Position: Sitting, Cuff Size: Adult Regular)   Pulse 95   Temp 97.7  F (36.5  C) (Oral)   Resp 16   Ht 1.803 m (5' 11\")   Wt 67.8 kg (149 lb 9 oz)   SpO2 100%   BMI 20.86 kg/m    Body mass index is 20.86 kg/m .  Physical Exam   Alert, pleasant, upbeat, and in no apparent discomfort.  S1 and S2 normal, no " murmurs, clicks, gallops or rubs. Regular rate and rhythm. Chest is clear; no wheezes or rales. No edema or JVD.  Past labs reviewed with the patient.             Signed Electronically by: Tonie Cui MD

## 2024-04-29 DIAGNOSIS — F41.1 GENERALIZED ANXIETY DISORDER: ICD-10-CM

## 2024-04-29 DIAGNOSIS — F31.9 BIPOLAR AFFECTIVE DISORDER, REMISSION STATUS UNSPECIFIED (H): ICD-10-CM

## 2024-04-29 RX ORDER — CLONAZEPAM 1 MG/1
1 TABLET ORAL 3 TIMES DAILY PRN
Qty: 60 TABLET | OUTPATIENT
Start: 2024-04-29

## 2024-05-23 DIAGNOSIS — F31.9 BIPOLAR AFFECTIVE DISORDER, REMISSION STATUS UNSPECIFIED (H): ICD-10-CM

## 2024-05-23 DIAGNOSIS — F41.1 GENERALIZED ANXIETY DISORDER: ICD-10-CM

## 2024-05-23 RX ORDER — CLONAZEPAM 1 MG/1
1 TABLET ORAL 3 TIMES DAILY PRN
Qty: 60 TABLET | Refills: 0 | Status: SHIPPED | OUTPATIENT
Start: 2024-05-24 | End: 2024-06-20

## 2024-06-20 DIAGNOSIS — F41.1 GENERALIZED ANXIETY DISORDER: ICD-10-CM

## 2024-06-20 DIAGNOSIS — F31.9 BIPOLAR AFFECTIVE DISORDER, REMISSION STATUS UNSPECIFIED (H): ICD-10-CM

## 2024-06-20 DIAGNOSIS — L64.9 MALE PATTERN BALDNESS: ICD-10-CM

## 2024-06-20 RX ORDER — FINASTERIDE 5 MG/1
TABLET, FILM COATED ORAL
Qty: 30 TABLET | Refills: 5 | Status: SHIPPED | OUTPATIENT
Start: 2024-06-20

## 2024-06-20 RX ORDER — CLONAZEPAM 1 MG/1
1 TABLET ORAL 3 TIMES DAILY PRN
Qty: 60 TABLET | Refills: 0 | Status: SHIPPED | OUTPATIENT
Start: 2024-06-20 | End: 2024-07-16

## 2024-07-16 DIAGNOSIS — F31.9 BIPOLAR AFFECTIVE DISORDER, REMISSION STATUS UNSPECIFIED (H): ICD-10-CM

## 2024-07-16 DIAGNOSIS — F41.1 GENERALIZED ANXIETY DISORDER: ICD-10-CM

## 2024-07-16 RX ORDER — CLONAZEPAM 1 MG/1
1 TABLET ORAL 3 TIMES DAILY PRN
Qty: 60 TABLET | Refills: 0 | Status: SHIPPED | OUTPATIENT
Start: 2024-07-16 | End: 2024-08-16

## 2024-07-22 ENCOUNTER — TELEPHONE (OUTPATIENT)
Dept: FAMILY MEDICINE | Facility: CLINIC | Age: 54
End: 2024-07-22
Payer: COMMERCIAL

## 2024-07-22 NOTE — TELEPHONE ENCOUNTER
"S-(situation): works at boat delivership, was on a polly metal pallet, the hook got loose, the motor pallette is metal and it cut through \" a very clean cut\", not bleeding , is wrapped, right leg is just scratched, Arm is 3 inch slice, this 1/2 hour ago. No other injuries    B-(background): now in [place]    A-(assessment): needs eval and likely sutures    R-(recommendations): advised to go to Urgent Care now and the nearest one to him is [place]. He agrees    Josephine Gonzalez, RN, BSN  [facility]           "

## 2024-07-29 ENCOUNTER — TELEPHONE (OUTPATIENT)
Dept: SCHEDULING | Facility: CLINIC | Age: 54
End: 2024-07-29
Payer: COMMERCIAL

## 2024-07-29 NOTE — TELEPHONE ENCOUNTER
Reason for Call:  Appointment Request    Patient requesting this type of appt:  Office visit: suture removal     Requested provider: Tonie Cui    Reason patient unable to be scheduled: Not within requested timeframe    When does patient want to be seen/preferred time: Same day    Comments: Pt is needing to have sutures removed from work related accident, located on the top of his right arm. Pt would like to see PCP for this, needing to be removed by today if possible.    Could we send this information to you in [facility] or would you prefer to receive a phone call?:   Patient would prefer a phone call   Okay to leave a detailed message?: Yes at Home number on file [phone] (home)    Call taken on 7/29/2024 at 8:59 AM by Delphine Bowen

## 2024-08-16 DIAGNOSIS — F41.1 GENERALIZED ANXIETY DISORDER: ICD-10-CM

## 2024-08-16 DIAGNOSIS — F31.9 BIPOLAR AFFECTIVE DISORDER, REMISSION STATUS UNSPECIFIED (H): ICD-10-CM

## 2024-08-16 RX ORDER — CLONAZEPAM 1 MG/1
1 TABLET ORAL 3 TIMES DAILY PRN
Qty: 60 TABLET | Refills: 0 | Status: SHIPPED | OUTPATIENT
Start: 2024-08-16 | End: 2024-09-17

## 2024-09-17 DIAGNOSIS — F41.1 GENERALIZED ANXIETY DISORDER: ICD-10-CM

## 2024-09-17 DIAGNOSIS — F31.9 BIPOLAR AFFECTIVE DISORDER, REMISSION STATUS UNSPECIFIED (H): ICD-10-CM

## 2024-09-17 RX ORDER — CLONAZEPAM 1 MG/1
1 TABLET ORAL 3 TIMES DAILY PRN
Qty: 60 TABLET | Refills: 0 | Status: SHIPPED | OUTPATIENT
Start: 2024-09-17

## 2024-09-20 ENCOUNTER — VIRTUAL VISIT (OUTPATIENT)
Dept: FAMILY MEDICINE | Facility: CLINIC | Age: 54
End: 2024-09-20
Payer: COMMERCIAL

## 2024-09-20 DIAGNOSIS — S51.811A FOREARM LACERATION WITH COMPLICATION, RIGHT, INITIAL ENCOUNTER: Primary | ICD-10-CM

## 2024-09-20 DIAGNOSIS — R20.0 FINGER NUMBNESS: ICD-10-CM

## 2024-09-20 PROCEDURE — 99214 OFFICE O/P EST MOD 30 MIN: CPT | Mod: 95 | Performed by: FAMILY MEDICINE

## 2024-09-20 NOTE — PROGRESS NOTES
Jose Luis is a 54 year old who is being evaluated via a billable video visit.    How would you like to obtain your AVS? Mail a copy  If the video visit is dropped, the invitation should be resent by: Text to cell phone: [phone]  Will anyone else be joining your video visit? No      Assessment & Plan     Workmen's Compensation related injury on 7/22/2024  Patient will provide appropriate information    Forearm laceration with complication, right, initial encounter  My first visit with patient for this issue but I was able to review history.  He was working on a heavy boat motor when it slipped and the pallet and motor landed on his arm and leg.  Suffered laceration to the extensor aspect of his right forearm and was seen through the emergency department.  They felt this was more of a surface issue and sutured laceration.  That part of it is healed up.  But ever since the injury he has had persistent numbness in his third and fourth fingers.  Says sometimes his hand cramps up.  He mentions seeing [facility] orthopedics about this but I do not have those records.  What he tells me is they told him it may be carpal tunnel related but that does not fit at all with his location of symptoms.  This is more in the ulnar nerve distribution.  So it is impossible to say simply through video visit whether this is an issue of strength or just sensation and we discussed that this certainly could involve a nerve apraxia or a laceration of a branch of the nerve itself.  So we need some expert help to figure this out.  We will get him plugged in with orthopedics.  - Orthopedic  Referral; Future    Finger numbness  As above  - Orthopedic  Referral; Future            Regular exercise  See Patient Instructions    Subjective   Jose Luis is a 54 year old, presenting for the following health issues:  Work Comp        9/20/2024     7:51 AM   Additional Questions   Roomed by lori   Accompanied by self     History of Present  Illness       Reason for visit:  Work comp - where to go forward          Video visit patient today to talk about forearm injury and persistent numbness in his fingers.      Review of Systems  Constitutional, HEENT, cardiovascular, pulmonary, gi and gu systems are negative, except as otherwise noted.      Objective           Vitals:  No vitals were obtained today due to virtual visit.    Physical Exam   GENERAL: alert and no distress  EYES: Eyes grossly normal to inspection.  No discharge or erythema, or obvious scleral/conjunctival abnormalities.  RESP: No audible wheeze, cough, or visible cyanosis.    SKIN: Visible skin clear. No significant rash, abnormal pigmentation or lesions.  NEURO: Cranial nerves grossly intact.  Mentation and speech appropriate for age.  PSYCH: Appropriate affect, tone, and pace of words    Past labs reviewed with the patient.       Video-Visit Details    Type of service:  Video Visit   Originating Location (pt. Location): Home    Distant Location (provider location):  Off-site  Platform used for Video Visit: Armaan  Signed Electronically by: Tonie Cui MD

## 2024-09-23 ENCOUNTER — PATIENT OUTREACH (OUTPATIENT)
Dept: CARE COORDINATION | Facility: CLINIC | Age: 54
End: 2024-09-23
Payer: COMMERCIAL

## 2024-09-23 ENCOUNTER — TELEPHONE (OUTPATIENT)
Dept: PLASTIC SURGERY | Facility: CLINIC | Age: 54
End: 2024-09-23
Payer: COMMERCIAL

## 2024-09-23 NOTE — TELEPHONE ENCOUNTER
Pt was injured in July when an out board motor fell on his right arm.  He had internal stitches on the 4th and 5th finger tendon.  He has no feeling in the 4th or 5th fingers. He also has pain in his elbow no one has evaluated the elbow but if he sets it down it sends pain up and down the arm.      He was treated and released at [facility], and followed up with [facility].  He didn't feel that he was given a care plan.  He is scheduled with Dr. Armstrong on 10/11. Please review to determine if this is scheduled w/I an appropriate time frame.     Thank you.     [phone] verified ok to LVM,

## 2024-09-23 NOTE — TELEPHONE ENCOUNTER
Timing on appt is ok due to old injury. If Pt would like to be seen sooner, DR. Camacho at [facility] would be the best provider to check.

## 2024-09-24 NOTE — TELEPHONE ENCOUNTER
REASON FOR VISIT: Forearm laceration with complication, right, initial encounter, Finger numbness from elbow to 4th and 5th and some thumb pain numb, no feeling, WC    DATE OF APPT: 10/11/2024   NOTES (FOR ALL VISITS) STATUS DETAILS   OFFICE NOTE from referring provider Internal [facility]   Tonie Cui MD 9/20/2024   OFFICE NOTE from other specialist Care Everywhere [facility] Emergency Dept.  Darrick Boyd DO 7/22/2024   MEDICATION LIST Internal    IMAGING  (FOR ALL VISITS)     XR Care Everywhere [facility] Emergency Dept[facility]  XR Forearm right 7/22/2024   MRI (HEAD, NECK, SPINE) N/A    CT (HEAD, NECK, SPINE) N/A

## 2024-10-11 ENCOUNTER — ANCILLARY PROCEDURE (OUTPATIENT)
Dept: GENERAL RADIOLOGY | Facility: CLINIC | Age: 54
End: 2024-10-11
Attending: STUDENT IN AN ORGANIZED HEALTH CARE EDUCATION/TRAINING PROGRAM
Payer: OTHER MISCELLANEOUS

## 2024-10-11 ENCOUNTER — PRE VISIT (OUTPATIENT)
Dept: ORTHOPEDICS | Facility: CLINIC | Age: 54
End: 2024-10-11

## 2024-10-11 ENCOUNTER — OFFICE VISIT (OUTPATIENT)
Dept: ORTHOPEDICS | Facility: CLINIC | Age: 54
End: 2024-10-11
Attending: FAMILY MEDICINE
Payer: OTHER MISCELLANEOUS

## 2024-10-11 DIAGNOSIS — R20.0 FINGER NUMBNESS: ICD-10-CM

## 2024-10-11 DIAGNOSIS — S51.811A FOREARM LACERATION WITH COMPLICATION, RIGHT, INITIAL ENCOUNTER: ICD-10-CM

## 2024-10-11 DIAGNOSIS — M25.531 RIGHT WRIST PAIN: Primary | ICD-10-CM

## 2024-10-11 DIAGNOSIS — M25.531 RIGHT WRIST PAIN: ICD-10-CM

## 2024-10-11 PROCEDURE — 73110 X-RAY EXAM OF WRIST: CPT | Mod: RT | Performed by: RADIOLOGY

## 2024-10-11 PROCEDURE — 99204 OFFICE O/P NEW MOD 45 MIN: CPT | Performed by: STUDENT IN AN ORGANIZED HEALTH CARE EDUCATION/TRAINING PROGRAM

## 2024-10-11 ASSESSMENT — PAIN SCALES - GENERAL: PAINLEVEL: MODERATE PAIN (5)

## 2024-10-11 NOTE — NURSING NOTE
Chief Complaint   Patient presents with    Right Forearm - Pain, Numbness, New Patient     Right forearm injury from 7/22/2024       There were no vitals filed for this visit.    There is no height or weight on file to calculate BMI.      KARRIE Quezada NREMT

## 2024-10-11 NOTE — LETTER
10/11/2024      Jose Luis Christoph Gonzáles  [address] [place] [address]      Dear Colleague,    Thank you for referring your patient, Jose Luis Christoph Gonzáles, to the [facility]. Please see a copy of my visit note below.    Ortho Hand    HPI: 54M RHD S s/p L dorsal forearm laceration p/w R wrist pain and finger paresthesias. Patient has an injury in July that involved a laceration to the R dorsal forearm. It seems since then he developed pain in the R wrist and paresthesias affecting the R small finger.     ROS: Negative, see HPI  PMH: Nondiabetic  PSH: None to the hands or wrists  Medications: No blood thinners  Allergies: Clonidine, Keflex, Seroquel, Valproic acid, Ibuprofen, contrast dye  SH: Vapes. Used to smoke marijuana.   FH: No bleeding or clotting issues, or problems with anesthesia    Examination:  Nonlabored breathing  Not distressed  Can make a full R composite fist  No median distributed paresthesias, but decreased sensation in the ulnar-distributed fingertips and dorsal hand  No Tinel's over the R distal dorsal forearm with well-healed laceration  Right medial elbow Tinel's with positive elbow flexion test  Tenderness over the R second extensor compartment, but no other focal tenderness  R radial distributed motor and sensation is intact.     XR: No fractures or dislocations. Normal carpal alignment. Mild R thumb CMC arthritis.    A/P: 54M p/w R ulnar neuropathy, R second extensor compartment tendinitis    -Provided wrist cock-up splint. Discussed activity modification, NSAIDs, and use of splint for immobilization to treat the wrist tendinitis.   -As for the paresthesias, my concern is ulnar compressive neuropathy. We will obtain NCS/EMG. This is unlikely related to the laceration.   -NCS/EMG ordered  -Once the NCS/EMG is done, we will have the patient return to clinic  -A total of 45 minutes was devoted to review of chart, direct face-to-face patient counseling and documentation  during this encounter, exclusive of any procedure performed.    Usman Armstrong MD, PhD    Again, thank you for allowing me to participate in the care of your patient.        Sincerely,        Usman Armstrong MD

## 2024-10-11 NOTE — NURSING NOTE
DME FITTING    Relevant Diagnosis: Wrist Pain  Wrist brace was fit on patient's Right wrist.     Person(s) involved in teaching:   Patient    Brace was applied in standard Manner:  Yes  Brace fit well:  Yes  Patient reports brace to fit comfortably:  Yes    Education:   Patient shown self application and removal of brace: Yes  Patient shown how to adjust brace fit, if necessary: Yes  Patient educated on billing and return policy: Yes  Patient confirmed understanding when and how to contact clinic with concerns: Yes

## 2024-10-12 NOTE — PROGRESS NOTES
Ortho Hand    HPI: 54M RHD S s/p L dorsal forearm laceration p/w R wrist pain and finger paresthesias. Patient has an injury in July that involved a laceration to the R dorsal forearm. It seems since then he developed pain in the R wrist and paresthesias affecting the R small finger.     ROS: Negative, see HPI  PMH: Nondiabetic  PSH: None to the hands or wrists  Medications: No blood thinners  Allergies: Clonidine, Keflex, Seroquel, Valproic acid, Ibuprofen, contrast dye  SH: Vapes. Used to smoke marijuana.   FH: No bleeding or clotting issues, or problems with anesthesia    Examination:  Nonlabored breathing  Not distressed  Can make a full R composite fist  No median distributed paresthesias, but decreased sensation in the ulnar-distributed fingertips and dorsal hand  No Tinel's over the R distal dorsal forearm with well-healed laceration  Right medial elbow Tinel's with positive elbow flexion test  Tenderness over the R second extensor compartment, but no other focal tenderness  R radial distributed motor and sensation is intact.     XR: No fractures or dislocations. Normal carpal alignment. Mild R thumb CMC arthritis.    A/P: 54M p/w R ulnar neuropathy, R second extensor compartment tendinitis    -Provided wrist cock-up splint. Discussed activity modification, NSAIDs, and use of splint for immobilization to treat the wrist tendinitis.   -As for the paresthesias, my concern is ulnar compressive neuropathy. We will obtain NCS/EMG. This is unlikely related to the laceration.   -NCS/EMG ordered  -Once the NCS/EMG is done, we will have the patient return to clinic  -A total of 45 minutes was devoted to review of chart, direct face-to-face patient counseling and documentation during this encounter, exclusive of any procedure performed.    Usman Armstrong MD, PhD

## 2024-10-13 DIAGNOSIS — F31.9 BIPOLAR AFFECTIVE DISORDER, REMISSION STATUS UNSPECIFIED (H): ICD-10-CM

## 2024-10-13 DIAGNOSIS — F41.1 GENERALIZED ANXIETY DISORDER: ICD-10-CM

## 2024-10-14 RX ORDER — CLONAZEPAM 1 MG/1
1 TABLET ORAL 3 TIMES DAILY PRN
Qty: 60 TABLET | Refills: 0 | Status: SHIPPED | OUTPATIENT
Start: 2024-10-14 | End: 2024-11-08

## 2024-11-08 DIAGNOSIS — F41.1 GENERALIZED ANXIETY DISORDER: ICD-10-CM

## 2024-11-08 DIAGNOSIS — F31.9 BIPOLAR AFFECTIVE DISORDER, REMISSION STATUS UNSPECIFIED (H): ICD-10-CM

## 2024-11-08 RX ORDER — CLONAZEPAM 1 MG/1
1 TABLET ORAL 3 TIMES DAILY PRN
Qty: 60 TABLET | Refills: 0 | Status: SHIPPED | OUTPATIENT
Start: 2024-11-08

## 2024-12-10 NOTE — TELEPHONE ENCOUNTER
Action 12/10/2024   Action Taken 1) Requested XR from [facility]       REASON FOR VISIT: Forearm laceration with complication, right, initial encounter, Finger numbness from elbow to 4th and 5th and some thumb pain numb    DATE OF APPT: 1/17/2025   NOTES (FOR ALL VISITS) STATUS DETAILS   OFFICE NOTE from referring provider Internal [facility] Tonie Sotomayor MD 9/20/2024   OFFICE NOTE from other specialist In process [facility] Emergency Dept  Darrick Boyd DO 7/22/2024   EMG N/A    MEDICATION LIST N/A    IMAGING  (FOR ALL VISITS)     XR ''Internal [facility]  XR Wrist right 10/11/2024    [facility] Emergency Dept  XR Forearm right 7/22/2024   MRI (HEAD, NECK, SPINE) N/A    CT (HEAD, NECK, SPINE) N/A

## 2025-01-02 DIAGNOSIS — F31.9 BIPOLAR AFFECTIVE DISORDER, REMISSION STATUS UNSPECIFIED (H): ICD-10-CM

## 2025-01-02 DIAGNOSIS — L64.9 MALE PATTERN BALDNESS: ICD-10-CM

## 2025-01-02 DIAGNOSIS — F41.1 GENERALIZED ANXIETY DISORDER: ICD-10-CM

## 2025-01-02 RX ORDER — FINASTERIDE 5 MG/1
TABLET, FILM COATED ORAL
Qty: 90 TABLET | Refills: 0 | Status: SHIPPED | OUTPATIENT
Start: 2025-01-02

## 2025-01-02 RX ORDER — CLONAZEPAM 1 MG/1
1 TABLET ORAL 3 TIMES DAILY PRN
Qty: 60 TABLET | Refills: 0 | Status: SHIPPED | OUTPATIENT
Start: 2025-01-02

## 2025-01-17 ENCOUNTER — PRE VISIT (OUTPATIENT)
Dept: ORTHOPEDICS | Facility: CLINIC | Age: 55
End: 2025-01-17

## 2025-01-17 ENCOUNTER — OFFICE VISIT (OUTPATIENT)
Dept: ORTHOPEDICS | Facility: CLINIC | Age: 55
End: 2025-01-17
Payer: OTHER MISCELLANEOUS

## 2025-01-17 ENCOUNTER — ANCILLARY PROCEDURE (OUTPATIENT)
Dept: GENERAL RADIOLOGY | Facility: CLINIC | Age: 55
End: 2025-01-17
Attending: STUDENT IN AN ORGANIZED HEALTH CARE EDUCATION/TRAINING PROGRAM
Payer: MEDICAID

## 2025-01-17 DIAGNOSIS — R20.0 FINGER NUMBNESS: ICD-10-CM

## 2025-01-17 DIAGNOSIS — R20.0 FINGER NUMBNESS: Primary | ICD-10-CM

## 2025-01-17 PROCEDURE — 73030 X-RAY EXAM OF SHOULDER: CPT | Mod: RT | Performed by: RADIOLOGY

## 2025-01-17 PROCEDURE — 99213 OFFICE O/P EST LOW 20 MIN: CPT | Performed by: STUDENT IN AN ORGANIZED HEALTH CARE EDUCATION/TRAINING PROGRAM

## 2025-01-17 PROCEDURE — 73080 X-RAY EXAM OF ELBOW: CPT | Mod: RT | Performed by: RADIOLOGY

## 2025-01-17 ASSESSMENT — PAIN SCALES - GENERAL: PAINLEVEL_OUTOF10: MODERATE PAIN (5)

## 2025-01-17 NOTE — LETTER
1/17/2025      Jose Luis Christoph Gonzáles  [address] [place] [address]      Dear Colleague,    Thank you for referring your patient, Jose Luis Christoph Gonzáles, to the [facility]. Please see a copy of my visit note below.      Ortho Hand    Patient returns with ongoing right small and ring finger numbness. He has not yet had a nerve study but it is scheduled. He complains of R shoulder and elbow pain since the accident.       On exam, decreased R RF/SF sensation, with no significant change in examination except that the fourth extensor compartment swelling and tenderness is significantly improved. Can fully abduct the R arm including overhead. Some tenderness over the R AC joint and mild long biceps tendon tenderness. Mildly positive Vandana test. R active elbow flexion-extension arc of 0-135 deg. No swelling or deformity in the elbow. No R medial or lateral humeral epicondyle tenderness. No R lateral elbow complex tenderness or tenderness over the radiocapitellar joint. Mild tenderness over the distal triceps insertion and at the R olecranon but with no swelling.       XR R Elbow: No fractures or dislocation. No joint effusion. No osteoarthritis. Some possible periosteal reaction near the triceps insertion and posterior proximal ulna.       XR R Shoulder: No fractures or dislocation. Mild AC joint arthritis. Narrow subacromial space. No glenohumeral arthritis.         A/P: 54M RHD S p/w R RF/SF paresthesias    -Explained that patient needs a nerve study to evaluate the nerve conduction and for possible compression points. He has that scheduled. Once done, we will review the findings.   -Sports medicine referral for the shoulder and elbow  -A total of 10 minutes was devoted to review of chart, direct face-to-face patient counseling and documentation during and on the day of this encounter, exclusive of any procedure performed.    Usman Armstrong MD, PhD, FACS      Again, thank you for allowing me to  participate in the care of your patient.        Sincerely,    Usman Armstrong MD    Electronically signed

## 2025-01-17 NOTE — NURSING NOTE
Chief Complaint   Patient presents with    Right Arm - Pain, Follow Up, Numbness     Right arm issues from injury       There were no vitals filed for this visit.    There is no height or weight on file to calculate BMI.      KARRIE Quezada NREMT

## 2025-01-19 NOTE — PROGRESS NOTES
Ortho Hand    Patient returns with ongoing right small and ring finger numbness. He has not yet had a nerve study but it is scheduled. He complains of R shoulder and elbow pain since the accident.     On exam, decreased R RF/SF sensation, with no significant change in examination except that the fourth extensor compartment swelling and tenderness is significantly improved. Can fully abduct the R arm including overhead. Some tenderness over the R AC joint and mild long biceps tendon tenderness. Mildly positive Vandana test. R active elbow flexion-extension arc of 0-135 deg. No swelling or deformity in the elbow. No R medial or lateral humeral epicondyle tenderness. No R lateral elbow complex tenderness or tenderness over the radiocapitellar joint. Mild tenderness over the distal triceps insertion and at the R olecranon but with no swelling.     XR R Elbow: No fractures or dislocation. No joint effusion. No osteoarthritis. Some possible periosteal reaction near the triceps insertion and posterior proximal ulna.     XR R Shoulder: No fractures or dislocation. Mild AC joint arthritis. Narrow subacromial space. No glenohumeral arthritis.     A/P: 54M RHD S p/w R RF/SF paresthesias    -Explained that patient needs a nerve study to evaluate the nerve conduction and for possible compression points. He has that scheduled. Once done, we will review the findings.   -Sports medicine referral for the shoulder and elbow  -A total of 10 minutes was devoted to review of chart, direct face-to-face patient counseling and documentation during and on the day of this encounter, exclusive of any procedure performed.    Usman Armstrong MD, PhD, FACS

## 2025-01-30 DIAGNOSIS — F41.1 GENERALIZED ANXIETY DISORDER: ICD-10-CM

## 2025-01-30 DIAGNOSIS — F31.9 BIPOLAR AFFECTIVE DISORDER, REMISSION STATUS UNSPECIFIED (H): ICD-10-CM

## 2025-01-30 NOTE — TELEPHONE ENCOUNTER
No future appointment on the books with Dr. Cui   Not addressed since 4/3/24  Last filled 1/2/25  Can wait for Dr. Cui to address on 1/27/25

## 2025-02-02 RX ORDER — CLONAZEPAM 1 MG/1
1 TABLET ORAL 3 TIMES DAILY PRN
Qty: 60 TABLET | Refills: 0 | Status: SHIPPED | OUTPATIENT
Start: 2025-02-02

## 2025-02-24 ENCOUNTER — OFFICE VISIT (OUTPATIENT)
Dept: FAMILY MEDICINE | Facility: CLINIC | Age: 55
End: 2025-02-24
Payer: MEDICAID

## 2025-02-24 VITALS
BODY MASS INDEX: 21.87 KG/M2 | OXYGEN SATURATION: 100 % | WEIGHT: 156.8 LBS | HEART RATE: 75 BPM | RESPIRATION RATE: 10 BRPM | TEMPERATURE: 97.5 F

## 2025-02-24 DIAGNOSIS — M25.511 RIGHT SHOULDER PAIN, UNSPECIFIED CHRONICITY: ICD-10-CM

## 2025-02-24 DIAGNOSIS — S59.911D INJURY OF RIGHT FOREARM, SUBSEQUENT ENCOUNTER: ICD-10-CM

## 2025-02-24 DIAGNOSIS — R20.0 FINGER NUMBNESS: Primary | ICD-10-CM

## 2025-02-24 PROCEDURE — 99214 OFFICE O/P EST MOD 30 MIN: CPT | Performed by: FAMILY MEDICINE

## 2025-02-24 ASSESSMENT — PAIN SCALES - GENERAL: PAINLEVEL_OUTOF10: NO PAIN (0)

## 2025-02-24 NOTE — PROGRESS NOTES
Answers submitted by the patient for this visit:  General Questionnaire (Submitted on 2/24/2025)  Chief Complaint: Chronic problems general questions HPI Form  What is the reason for your visit today? : check up on injury get doctors opion  How many servings of fruits and vegetables do you eat daily?: 2-3  On average, how many sweetened beverages do you drink each day (Examples: soda, juice, sweet tea, etc.  Do NOT count diet or artificially sweetened beverages)?: 4  How many minutes a day do you exercise enough to make your heart beat faster?: 60 or more  How many days a week do you exercise enough to make your heart beat faster?: 6  How many days per week do you miss taking your medication?: 1  Questionnaire about: Chronic problems general questions HPI Form (Submitted on 2/24/2025)  Chief Complaint: Chronic problems general questions HPI Form    Assessment & Plan     DOI:  7/22/2024    Finger numbness  I first saw the patient for this last July.  He was working on a heavy boat motor when it slipped and the pallet and motor landed on his arm and leg. Suffered laceration to the extensor aspect of his right forearm and was seen through the emergency department. They felt this was more of a surface issue and sutured laceration.  But has had continued issues since then.  Has also been evaluated by Dr. Usman Armstrong (orthopedic surgery).  Superficial wounds have healed but he has persistent numbness in his right fourth and fifth finger in the ulnar nerve distribution.  The plan is for an EMG to evaluate the level of impingement but this first needs to be approved by Workmen's Compensation.  So I wrote a letter regarding.  We have no previous history of any such symptoms and I feel that all of this is related to that injury and needs to be covered under Workmen's Compensation.    Injury of right forearm, subsequent encounter  As above     Right shoulder pain, unspecified chronicity  Has also been experiencing some  right shoulder pain and we discussed that this could be referred pain from a neurologic impingement.  It could be part of the impingement itself if the level is high enough.  And certainly could be indirectly related to the area of injury as the full force of the boat motor was caught by his arm and leg.  So this will need further evaluation as well.            See Patient Instructions    Subjective   Jose Luis is a 54 year old, presenting for the following health issues:  Trauma        2/24/2025     1:15 PM   Additional Questions   Roomed by Tera   Accompanied by self     History of Present Illness       Reason for visit:  Check up on injury get doctors opion    He eats 2-3 servings of fruits and vegetables daily.He consumes 4 sweetened beverage(s) daily.He exercises with enough effort to increase his heart rate 60 or more minutes per day.  He exercises with enough effort to increase his heart rate 6 days per week. He is missing 1 dose(s) of medications per week.           Here today for the above.      Review of Systems  Constitutional, HEENT, cardiovascular, pulmonary, gi and gu systems are negative, except as otherwise noted.      Objective    Pulse 75   Temp 97.5  F (36.4  C) (Oral)   Resp 10   Wt 71.1 kg (156 lb 12.8 oz)   SpO2 100%   BMI 21.87 kg/m    Body mass index is 21.87 kg/m .  Physical Exam   Alert, pleasant, upbeat, and in no apparent discomfort.  S1 and S2 normal, no murmurs, clicks, gallops or rubs. Regular rate and rhythm. Chest is clear; no wheezes or rales. No edema or JVD.  Normal muscle mass in both forearms but has significant numbness subjectively to touch in his right fourth and fifth fingers  Past labs reviewed with the patient.   Reviewed imaging            Signed Electronically by: Tonie Cui MD

## 2025-02-24 NOTE — LETTER
February 24, 2025      Jose Luis Christoph Gonzáles  [address] [place] [address]        DOI:  7/22/2024    Diagnosis:   Compressive forearm injury with laceration, persistent numbness of right 4th, 5th fingers    The patient has been evaluated both by myself and by Dr. Usman Armstrong (orthopedic surgery) and it is suggested that the next phase of his evaluation is an EMG study to determine the location of nerve compression.  I feel that this injury is entirely related to the work-related injury of 7/22/2024.  Please expedite the coverage issue of this EMG study.          Sincerely,        Tonie Cui MD    Electronically signed

## 2025-02-26 DIAGNOSIS — F41.1 GENERALIZED ANXIETY DISORDER: ICD-10-CM

## 2025-02-26 DIAGNOSIS — F31.9 BIPOLAR AFFECTIVE DISORDER, REMISSION STATUS UNSPECIFIED (H): ICD-10-CM

## 2025-02-26 RX ORDER — CLONAZEPAM 1 MG/1
1 TABLET ORAL 3 TIMES DAILY PRN
Qty: 60 TABLET | Refills: 0 | Status: SHIPPED | OUTPATIENT
Start: 2025-02-26

## 2025-03-20 ENCOUNTER — TELEPHONE (OUTPATIENT)
Dept: FAMILY MEDICINE | Facility: CLINIC | Age: 55
End: 2025-03-20
Payer: MEDICAID

## 2025-03-20 DIAGNOSIS — F31.9 BIPOLAR AFFECTIVE DISORDER, REMISSION STATUS UNSPECIFIED (H): ICD-10-CM

## 2025-03-20 DIAGNOSIS — F41.1 GENERALIZED ANXIETY DISORDER: ICD-10-CM

## 2025-03-20 RX ORDER — CLONAZEPAM 1 MG/1
1 TABLET ORAL 3 TIMES DAILY PRN
Qty: 60 TABLET | Refills: 0 | Status: SHIPPED | OUTPATIENT
Start: 2025-03-20

## 2025-03-20 RX ORDER — CLONAZEPAM 1 MG/1
1 TABLET ORAL 3 TIMES DAILY PRN
Qty: 60 TABLET | OUTPATIENT
Start: 2025-03-20

## 2025-03-20 NOTE — TELEPHONE ENCOUNTER
Reason for Call:  Medication or medication refill:    Do you use a [facility]?  Name of the pharmacy and phone number for the current request:  [facility] [address] AT [address]     Name of the medication requested: clonazePAM (KLONOPIN) 1 MG tablet     Other request: due on 3/25 Pt is leaving out of town Pt would like to  before then. Sending to provider per Pt's request.     Can we leave a detailed message on this number? YES    Phone number patient can be reached at: Cell number on file:    Telephone Information:   Mobile [phone]       Call taken on 3/20/2025 at 10:02 AM by Kim Hudson

## 2025-04-22 DIAGNOSIS — F31.9 BIPOLAR AFFECTIVE DISORDER, REMISSION STATUS UNSPECIFIED (H): ICD-10-CM

## 2025-04-22 DIAGNOSIS — F41.1 GENERALIZED ANXIETY DISORDER: ICD-10-CM

## 2025-04-22 RX ORDER — CLONAZEPAM 1 MG/1
1 TABLET ORAL 3 TIMES DAILY PRN
Qty: 60 TABLET | Refills: 0 | Status: SHIPPED | OUTPATIENT
Start: 2025-04-22

## 2025-05-19 DIAGNOSIS — F41.1 GENERALIZED ANXIETY DISORDER: ICD-10-CM

## 2025-05-19 DIAGNOSIS — F31.9 BIPOLAR AFFECTIVE DISORDER, REMISSION STATUS UNSPECIFIED (H): ICD-10-CM

## 2025-05-19 RX ORDER — CLONAZEPAM 1 MG/1
1 TABLET ORAL 3 TIMES DAILY PRN
Qty: 60 TABLET | Refills: 0 | Status: SHIPPED | OUTPATIENT
Start: 2025-05-19

## 2025-06-03 ENCOUNTER — TELEPHONE (OUTPATIENT)
Dept: FAMILY MEDICINE | Facility: CLINIC | Age: 55
End: 2025-06-03
Payer: MEDICAID

## 2025-06-03 NOTE — TELEPHONE ENCOUNTER
Routing to provider, ok for same day? Elbow concern is related to Work Comp. Please review and advise. David Christine RN, BSN, PHN

## 2025-06-03 NOTE — TELEPHONE ENCOUNTER
Reason for Call:  Appointment Request    Patient requesting this type of appt:  Elbow concerns, WC related    Requested provider: Tonie Cui    Reason patient unable to be scheduled: Not within requested timeframe    When does patient want to be seen/preferred time: ASAP    Comments: Pt states that he needs to be seen ASAP with PCP for this, adv that PCP is booked out until 08/13, pt wanted me to send a message on his behalf about this to see if any sooner come available to contact him or to let him know how to proceed in the meantime as their is a pending court case surrounding this situation. Please call pt back to advise.    Could we send this information to you in [facility] [employer] or would you prefer to receive a phone call?:   Patient would prefer a phone call   Okay to leave a detailed message?: Yes at Cell number on file:    Telephone Information:   Mobile [phone]       Call taken on 6/3/2025 at 2:22 PM by Amber Braden

## 2025-06-06 PROBLEM — M25.511 RIGHT SHOULDER PAIN, UNSPECIFIED CHRONICITY: Status: ACTIVE | Noted: 2025-06-06

## 2025-06-06 PROBLEM — S54.01XD: Status: ACTIVE | Noted: 2025-06-06

## 2025-06-09 ENCOUNTER — PATIENT OUTREACH (OUTPATIENT)
Dept: CARE COORDINATION | Facility: CLINIC | Age: 55
End: 2025-06-09
Payer: MEDICAID

## 2025-06-11 ENCOUNTER — PATIENT OUTREACH (OUTPATIENT)
Dept: CARE COORDINATION | Facility: CLINIC | Age: 55
End: 2025-06-11
Payer: MEDICAID

## 2025-06-18 NOTE — PROGRESS NOTES
Jose Luis Gonzáles  :  1970  DOS: 2025  MRN: [id number]  PCP: Tonie Cui    Sports Medicine Clinic Visit      HPI  Jose Luis Christoph Gonzáles is a 55 year old male who is seen in consultation at the request of  Tonie Cui M.D. presenting with right shoulder pain.    - Mechanism of Injury:    - Heavy 370 lb motor fell on his right arm in the Summer 2024. Workers comp claim  - Pertinent history and prior evaluations:    - 2025 visit with Dr. Cui: suspected impingement syndrome but possible rotator cuff tear.   -2025 right shoulder xray shows mild AC joint arthrosis. Glenohumeral joint space is maintained. No evidence of an acute fracture or dislocation.     - Pain Character:    - Location:  right arm, posterolateral  - Character:  ache, intermittent sharp with certain shoulder motions  - Duration:  1 year  - Course:  worsening  - Endorses:    - weakness of shoulder, atrophy of shoulder muscles, numbness and tingling of digits 4 and 5, pain as described above.   - Denies:    - instability, mechanical locking, radicular shooting pain  - Alleviating factors:    - Rest, activity modifications  - Aggravating factors:    - internal rotation, abduction. 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     Low back pain 2006    8.23.06 hx of low back pain-2 to 3 injuries-lifted  out-board motor in March, injured water skiing    Neck pain     Tobacco abuse 7/1/2011     Past Surgical History:   Procedure Laterality Date    CARDIAC SURGERY      procedure name unknown. pump between heart and lungs    HEAD & NECK SURGERY      hair transplants     Family History   Problem Relation Age of Onset    Prostate Cancer Father     Macular Degeneration Paternal Grandmother     Glaucoma Paternal Grandmother     Glaucoma Maternal Grandfather     Unknown/Adopted No family hx of     Family History Negative No family hx of     Asthma No family hx of     C.A.D. No family hx of     Diabetes No family hx of     Hypertension No family hx of     Cerebrovascular Disease No family hx of     Breast Cancer No family hx of     Cancer - colorectal No family hx of     Alcohol/Drug No family hx of     Allergies No family hx of     Alzheimer Disease No family hx of     Anesthesia Reaction No family hx of     Arthritis No family hx of     Blood Disease No family hx of     Cancer No family hx of     Cardiovascular No family hx of     Circulatory No family hx of     Congenital Anomalies No family hx of     Connective Tissue Disorder No family hx of     Depression No family hx of     Endocrine Disease No family hx of     Eye Disorder No family hx of     Genetic Disorder No family hx of     Gastrointestinal Disease No family hx of     Genitourinary Problems No family hx of     Gynecology No family hx of     Heart Disease No family hx of     Lipids No family hx of     Musculoskeletal Disorder No family hx of     Neurologic Disorder No family hx of     Obesity No family hx of     Osteoporosis No family hx of     Psychotic Disorder No family hx of     Respiratory No family hx of     Thyroid Disease No family hx of     Hearing Loss No family hx of          Objective  There were no vitals taken for this visit.    General: healthy, alert and in no acute distress.    HEENT: no scleral icterus or conjunctival erythema.   Skin: no  suspicious lesions or rash. No jaundice.   CV: regular rhythm by palpation, 2+ distal pulses.  Resp: normal respiratory effort without conversational dyspnea.   Psych: normal mood and affect.    Gait: nonantalgic, appropriate coordination and balance.     Neuro:        - Sensation to light touch:    - Diminished sensation in the medial forearm and digits 4-5. Otherwise SILT in all other nerve distributions.        - MSR:       RUE  LUE  - Biceps  2+ 2+  - Brachioradialis 2+ 2+  - Triceps  2+ 2+       - Special tests:   - Spurling's:  Neg     MSK - Shoulder:       - Inspection:    - No significant swelling, erythema, warmth, ecchymosis, lesion, or atrophy noted.        - ROM:    - Full AROM/PROM. Painful with ER       - Palpation:    - TTP at the RTC insertion.   - NTTP elsewhere.        - Strength:  (*antalgic)  - Shoulder Abduction   5*    - Shoulder Flexion   5    - Shoulder Internal Rotation  5    - Shoulder External Rotation  3*             - Special tests:        - Hazel:  Pos   - Neers:  Neg    - Empty can:  Pos for slight pain, no weakness    - [place]:  Neg    - Scarf:  Neg    - Speeds:  Neg    - Yergason:  Neg    - Apprehension/Relocation:  Neg       Radiology  I independently reviewed the available relevant imaging in the chart with my interpretations as above in HPI.       Assessment  1. Traumatic tear of right rotator cuff, unspecified tear extent, initial encounter        Plan  Jose Luis Christoph Gonzáles is a pleasant 55 year old male that presents with chronic right shoulder pain.  He does endorse a significant history of a traumatic injury about a year ago where a heavy motor fell onto his right side.  Since the injury, he has had a lot of pain in the shoulder along with many other symptoms.  His pain is primarily in the posterolateral shoulder and hurts worse with several lifting activities and waterskiing.  He slalom skis and cannot hold the rope when extending out to the right and abduction and external  rotation.  Also has a hard time pulling the starter on his snowmobile without severe pain and weakness.    On exam, he does have significant weakness and pain with external rotation > abduction, with an otherwise stable exam.  This raises at least reasonable concern that he is dealing with a significant rotator cuff tear.  Most suspicious of an infraspinatus tear and possibility for a supraspinatus tear as well.    Based on the severity of his pain and weakness and chronicity of symptoms, it will be important to evaluate the integrity of the rotator cuff and associated structures with advanced imaging (MRI) to rule out a complete tear.  MRI order has been placed and we will follow-up when results are available to discuss the results and available treatment options.      Prieto Alejandra DO, LEANNM  [facility]  [facility] - Department of Orthopedic Surgery       Disclaimer:  This note was prepared and written using Dragon Medical dictation software. As a result, there may be errors in the script that have gone undetected. Please consider this when interpreting the information in this note.

## 2025-06-24 ENCOUNTER — OFFICE VISIT (OUTPATIENT)
Dept: ORTHOPEDICS | Facility: CLINIC | Age: 55
End: 2025-06-24
Attending: FAMILY MEDICINE
Payer: OTHER MISCELLANEOUS

## 2025-06-24 DIAGNOSIS — S46.011A TRAUMATIC TEAR OF RIGHT ROTATOR CUFF, UNSPECIFIED TEAR EXTENT, INITIAL ENCOUNTER: Primary | ICD-10-CM

## 2025-06-24 PROCEDURE — 99204 OFFICE O/P NEW MOD 45 MIN: CPT | Performed by: STUDENT IN AN ORGANIZED HEALTH CARE EDUCATION/TRAINING PROGRAM

## 2025-06-24 NOTE — PATIENT INSTRUCTIONS
MRI Scheduling Instructions  Please follow both steps below    1.  Advanced imaging is done by appointment. Please call [facility] ([facility]/[place]) [phone] to schedule your MRI at your earliest convenience.   - Some insurance companies may require a prior authorization to be completed which can delay the time until you are able to schedule your appointment.     - If you are active on MyChart, you may have access to your test results before your provider is able to review the study and advise on next steps.      2. After the date of your MRI has been scheduled, please call [phone] to get on my schedule for an in-person or telephone follow up appointment to discuss the results and updated treatment recommendations. This follow up should be scheduled for 1-2 days after the date of your MRI.

## 2025-06-24 NOTE — LETTER
2025      Jose Luis Gonzáles  [address] [place] [address]      Dear Colleague,    Thank you for referring your patient, Jose Luis Gonzáles, [facility] Please see a copy of my visit note below.    Jose Luis Gonzáles  :  1970  DOS: 2025  MRN: [id number]  PCP: Tonie Cui    Sports Medicine Clinic Visit      HPI  Jose Luisdavion Gonzáles is a 55 year old male who is seen in consultation at the request of  Tonie Cui M.D. presenting with right shoulder pain.    - Mechanism of Injury:    - Heavy 370 lb motor fell on his right arm in the Summer 2024. Workers comp claim  - Pertinent history and prior evaluations:    - 2025 visit with Dr. Cui: suspected impingement syndrome but possible rotator cuff tear.   -2025 right shoulder xray shows mild AC joint arthrosis. Glenohumeral joint space is maintained. No evidence of an acute fracture or dislocation.     - Pain Character:    - Location:  right arm, posterolateral  - Character:  ache, intermittent sharp with certain shoulder motions  - Duration:  1 year  - Course:  worsening  - Endorses:    - weakness of shoulder, atrophy of shoulder muscles, numbness and tingling of digits 4 and 5, pain as described above.   - Denies:    - instability, mechanical locking, radicular shooting pain  - Alleviating factors:    - Rest, activity modifications  - Aggravating factors:    - internal rotation, abduction. 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      Low back pain 8/23/2006    8.23.06 hx of low back pain-2 to 3 injuries-lifted out-board motor in March, injured water skiing     Neck pain      Tobacco abuse 7/1/2011     Past Surgical History:   Procedure Laterality Date     CARDIAC SURGERY      procedure name unknown. pump between heart and lungs     HEAD & NECK SURGERY      hair transplants     Family History   Problem Relation Age of Onset     Prostate Cancer Father      Macular Degeneration Paternal Grandmother      Glaucoma Paternal Grandmother      Glaucoma Maternal Grandfather      Unknown/Adopted No family hx of      Family History Negative No family hx of      Asthma No family hx of      C.A.D. No family hx of      Diabetes No family hx of      Hypertension No family hx of      Cerebrovascular Disease No family hx of      Breast Cancer No family hx of      Cancer - colorectal No family hx of      Alcohol/Drug No family hx of      Allergies No family hx of      Alzheimer Disease No family hx of      Anesthesia Reaction No family hx of      Arthritis No family hx of      Blood Disease No family hx of      Cancer No family hx of      Cardiovascular No family hx of      Circulatory No family hx of      Congenital Anomalies No family hx of      Connective Tissue Disorder No family hx of      Depression No family hx of      Endocrine Disease No family hx of      Eye Disorder No family hx of      Genetic Disorder No family hx of      Gastrointestinal Disease No family hx of      Genitourinary Problems No family hx of      Gynecology No family hx of      Heart Disease No family hx of      Lipids No family hx of      Musculoskeletal Disorder No family hx of      Neurologic Disorder No family hx of      Obesity No family hx of      Osteoporosis No family hx of      Psychotic Disorder No family  hx of      Respiratory No family hx of      Thyroid Disease No family hx of      Hearing Loss No family hx of          Objective  There were no vitals taken for this visit.    General: healthy, alert and in no acute distress.    HEENT: no scleral icterus or conjunctival erythema.   Skin: no suspicious lesions or rash. No jaundice.   CV: regular rhythm by palpation, 2+ distal pulses.  Resp: normal respiratory effort without conversational dyspnea.   Psych: normal mood and affect.    Gait: nonantalgic, appropriate coordination and balance.     Neuro:        - Sensation to light touch:    - Diminished sensation in the medial forearm and digits 4-5. Otherwise SILT in all other nerve distributions.        - MSR:       RUE  LUE  - Biceps  2+ 2+  - Brachioradialis 2+ 2+  - Triceps  2+ 2+       - Special tests:   - Spurling's:  Neg     MSK - Shoulder:       - Inspection:    - No significant swelling, erythema, warmth, ecchymosis, lesion, or atrophy noted.        - ROM:    - Full AROM/PROM. Painful with ER       - Palpation:    - TTP at the RTC insertion.   - NTTP elsewhere.        - Strength:  (*antalgic)  - Shoulder Abduction   5*    - Shoulder Flexion   5    - Shoulder Internal Rotation  5    - Shoulder External Rotation  3*             - Special tests:        - Hazel:  Pos   - Neers:  Neg    - Empty can:  Pos for slight pain, no weakness    - Marquette:  Neg    - Scarf:  Neg    - Speeds:  Neg    - Yergason:  Neg    - Apprehension/Relocation:  Neg       Radiology  I independently reviewed the available relevant imaging in the chart with my interpretations as above in HPI.       Assessment  1. Traumatic tear of right rotator cuff, unspecified tear extent, initial encounter        Plan  Jose Luis Christoph Gonzáles is a pleasant 55 year old male that presents with chronic right shoulder pain.  He does endorse a significant history of a traumatic injury about a year ago where a heavy motor fell onto his right side.  Since the injury,  he has had a lot of pain in the shoulder along with many other symptoms.  His pain is primarily in the posterolateral shoulder and hurts worse with several lifting activities and waterskiing.  He slalom skis and cannot hold the rope when extending out to the right and abduction and external rotation.  Also has a hard time pulling the starter on his snowmobile without severe pain and weakness.    On exam, he does have significant weakness and pain with external rotation > abduction, with an otherwise stable exam.  This raises at least reasonable concern that he is dealing with a significant rotator cuff tear.  Most suspicious of an infraspinatus tear and possibility for a supraspinatus tear as well.    Based on the severity of his pain and weakness and chronicity of symptoms, it will be important to evaluate the integrity of the rotator cuff and associated structures with advanced imaging (MRI) to rule out a complete tear.  MRI order has been placed and we will follow-up when results are available to discuss the results and available treatment options.      Prieto Alejandra DO, CAQSM  [facility] Sports Medicine  [facility] - Department of Orthopedic Surgery       Disclaimer:  This note was prepared and written using Dragon Medical dictation software. As a result, there may be errors in the script that have gone undetected. Please consider this when interpreting the information in this note.      Again, thank you for allowing me to participate in the care of your patient.        Sincerely,        Prieto Alejandra DO    Electronically signed

## 2025-07-03 ENCOUNTER — ANCILLARY PROCEDURE (OUTPATIENT)
Dept: MRI IMAGING | Facility: CLINIC | Age: 55
End: 2025-07-03
Attending: STUDENT IN AN ORGANIZED HEALTH CARE EDUCATION/TRAINING PROGRAM
Payer: OTHER MISCELLANEOUS

## 2025-07-03 DIAGNOSIS — S46.011A TRAUMATIC TEAR OF RIGHT ROTATOR CUFF, UNSPECIFIED TEAR EXTENT, INITIAL ENCOUNTER: ICD-10-CM

## 2025-07-03 PROCEDURE — 73221 MRI JOINT UPR EXTREM W/O DYE: CPT | Mod: RT | Performed by: RADIOLOGY

## 2025-07-14 ENCOUNTER — TELEPHONE (OUTPATIENT)
Dept: FAMILY MEDICINE | Facility: CLINIC | Age: 55
End: 2025-07-14
Payer: COMMERCIAL

## 2025-07-14 DIAGNOSIS — F41.1 GENERALIZED ANXIETY DISORDER: ICD-10-CM

## 2025-07-14 DIAGNOSIS — F31.9 BIPOLAR AFFECTIVE DISORDER, REMISSION STATUS UNSPECIFIED (H): ICD-10-CM

## 2025-07-14 RX ORDER — CLONAZEPAM 1 MG/1
1 TABLET ORAL 3 TIMES DAILY PRN
Qty: 60 TABLET | Refills: 0 | Status: SHIPPED | OUTPATIENT
Start: 2025-07-14

## 2025-07-14 NOTE — TELEPHONE ENCOUNTER
Reason for Call:  Appointment Request    Patient requesting this type of appt:  Work Restrictions, Pain management for shoulder injury (ie muscle relaxers - does not want pain killers)    Requested provider: Tonie Cui    Reason patient unable to be scheduled: Not within requested timeframe    When does patient want to be seen/preferred time: ASAP     Comments: None    Could we send this information to you in [facility] or would you prefer to receive a phone call?:   Patient would prefer a phone call   Okay to leave a detailed message?: Yes at Home number on file [phone] (home)    Call taken on 7/14/2025 at 10:15 AM by Delphine Santizo

## 2025-07-14 NOTE — TELEPHONE ENCOUNTER
Medication Question or Refill    Contacts       Contact Date/Time Type Contact Phone/Fax    07/14/2025 10:17 AM CDT Phone (Incoming) Jose Luis Gonzáles (Self) [phone] (H)            What medication are you calling about (include dose and sig)?: ClonazePAM 1 MG Tablet     Preferred Pharmacy:   [id number] [address]  [address] [facility] [address]  Phone: [phone] Fax: [phone]          Controlled Substance Agreement on file:   CSA -- Patient Level:    CSA: None found at the patient level.       Who prescribed the medication?: Basilia    Do you need a refill? Yes    When did you use the medication last? Last night     Patient offered an appointment? No    Do you have any questions or concerns?  No      Could we send this information to you in [facility] or would you prefer to receive a phone call?:   Patient would prefer a phone call   Okay to leave a detailed message?: Yes at Home number on file [phone] (home)

## 2025-07-14 NOTE — TELEPHONE ENCOUNTER
I am okay with using same-day access for this but I will be out of the office on vacation later this week and I think it will have to be after I get back

## 2025-07-21 NOTE — PROGRESS NOTES
Jose Luis is a 55 year old who is being evaluated via a billable telephone visit.    What phone number would you like to be contacted at? [phone]  How would you like to obtain your AVS? MyChart  Originating Location (pt. Location): Home    Distant Location (provider location):  On-site  Telephone visit completed due to the patient did not consent to a video visit.  Phone call duration: 10 minutes  Total time:  30 minutes were spent on the date of the encounter doing chart review, history, imaging interpretation, MDM, counseling, documentation and further activities per the note.        Jose Luis Gonzáles  :  1970  DOS: 2025  MRN: [id number]  PCP: Tonie Cui    Sports Medicine Clinic Visit      Interim History - 2025  - Last seen on 2025 for right shoulder pain, posttraumatic, with concern for traumatic rotator cuff tear.  MRI was obtained to evaluate the integrity of the soft tissues of the shoulder and presents by telephone visit today for discussion of results and next steps in treatment.    - MRI right shoulder 7/3/2025   1. Mild to moderate osteoarthrosis at the acromioclavicular joint.     2. Focal area of subchondral edema along the anterior inferior glenoid  with adjacent cartilage heterogeneity, with possible cartilage  delamination.     3. Tendinosis of the rotator cuff tendons without full-thickness tear  or tendon retraction. Mild to moderate articular and bursal sided  partial-thickness tearing of the posterior supraspinatus tendon.     4. Minimal fatty infiltration within the teres minor muscle, otherwise  the remaining rotator cuff and deltoid musculature is intact.    5. Normal appearing biceps tendon.    - Since the last visit, no significant change in symptoms.   - No interim injury.       Initial Visit: 2025  HPI  Jose Luis Gonzáles is a 55 year old male who is seen in consultation at the request of  Tonie Cui M.D. presenting with right  shoulder pain.    - Mechanism of Injury:    - Heavy 370 lb motor fell on his right arm in the Summer 2024. Workers comp claim  - Pertinent history and prior evaluations:    - 6/6/2025 visit with Dr. Cui: suspected impingement syndrome but possible rotator cuff tear.   -1/17/2025 right shoulder xray shows mild AC joint arthrosis. Glenohumeral joint space is maintained. No evidence of an acute fracture or dislocation.     - Pain Character:    - Location:  right arm, posterolateral  - Character:  ache, intermittent sharp with certain shoulder motions  - Duration:  1 year  - Course:  worsening  - Endorses:    - weakness of shoulder, atrophy of shoulder muscles, numbness and tingling of digits 4 and 5, pain as described above.   - Denies:    - instability, mechanical locking, radicular shooting pain  - Alleviating factors:    - Rest, activity modifications  - Aggravating factors:    - internal rotation, abduction. 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     Low back pain 8/23/2006    8.23.06 hx of low back pain-2 to 3 injuries-lifted out-board motor in March, injured water skiing    Neck pain     Tobacco abuse 7/1/2011     Past Surgical History:   Procedure Laterality Date    CARDIAC SURGERY      procedure name unknown. pump between heart and lungs    HEAD & NECK SURGERY      hair transplants     Family History    Problem Relation Age of Onset    Prostate Cancer Father     Macular Degeneration Paternal Grandmother     Glaucoma Paternal Grandmother     Glaucoma Maternal Grandfather     Unknown/Adopted No family hx of     Family History Negative No family hx of     Asthma No family hx of     C.A.D. No family hx of     Diabetes No family hx of     Hypertension No family hx of     Cerebrovascular Disease No family hx of     Breast Cancer No family hx of     Cancer - colorectal No family hx of     Alcohol/Drug No family hx of     Allergies No family hx of     Alzheimer Disease No family hx of     Anesthesia Reaction No family hx of     Arthritis No family hx of     Blood Disease No family hx of     Cancer No family hx of     Cardiovascular No family hx of     Circulatory No family hx of     Congenital Anomalies No family hx of     Connective Tissue Disorder No family hx of     Depression No family hx of     Endocrine Disease No family hx of     Eye Disorder No family hx of     Genetic Disorder No family hx of     Gastrointestinal Disease No family hx of     Genitourinary Problems No family hx of     Gynecology No family hx of     Heart Disease No family hx of     Lipids No family hx of     Musculoskeletal Disorder No family hx of     Neurologic Disorder No family hx of     Obesity No family hx of     Osteoporosis No family hx of     Psychotic Disorder No family hx of     Respiratory No family hx of     Thyroid Disease No family hx of     Hearing Loss No family hx of        Objective  No physical exam due to the nature of the telephone visit.    Radiology  I independently reviewed the available relevant imaging in the chart with my interpretations as above in HPI.     I independently reviewed today's new relevant imaging, with the following interpretation:  - MRI of the right shoulder 7/3/2025 shows cartilage delamination and subchondral edema along the anterior inferior glenoid, partial-thickness bursal sided tearing of the  posterior supraspinatus tendon and tendinopathy within the rotator cuff tendons, chronic fatty infiltration of the teres minor, and moderate degenerative changes of the AC joint.      Assessment  1. Tendinopathy of rotator cuff, right    2. Glenoid chondromalacia of right shoulder          Plan  Jose Luis Gonzáles is a pleasant 55 year old male that presents with chronic right shoulder pain.  He does endorse a significant history of a traumatic injury about a year ago where a heavy motor fell onto his right side.  Since the injury, he has had a lot of pain in the shoulder along with many other symptoms.  His pain is primarily in the posterolateral shoulder and hurts worse with several lifting activities and waterskiing.  He slalom skis and cannot hold the rope when extending out to the right and abduction and external rotation.  Also has a hard time pulling the starter on his snowmobile without severe pain and weakness.    On exam, he does have significant weakness and pain with external rotation > abduction, with an otherwise stable exam.  This raises at least reasonable concern that he is dealing with a significant rotator cuff tear.  Most suspicious of an infraspinatus tear and possibility for a supraspinatus tear as well.    Based on the severity of his pain and weakness and chronicity of symptoms, it will be important to evaluate the integrity of the rotator cuff and associated structures with advanced imaging (MRI) to rule out a complete tear.  MRI order has been placed and we will follow-up when results are available to discuss the results and available treatment options.      Update - 7/22/25:  Jose Luis presents for follow-up of his chronic right shoulder pain and to discuss the results of his recent MRI.  The MRI did reveal a substantial amount of tendinopathy and a partial-thickness bursal sided tear of the posterior supraspinatus and chronic fatty infiltration within the teres minor.  His significant rotator  cuff pathology correlates well with his presenting symptoms on history and exam.  He also has a significant amount of subchondral edema within the anterior inferior glenoid with adjacent cartilage delamination.  This also seems to correlate well with his presenting history and symptoms.  We discussed the results of the MRI in detail and available treatment options.  At this time, I would recommend nonoperative treatment with corticosteroid injections and physical therapy, this would be his preference as well.  I placed an order for physical therapy and we will see him next week for an ultrasound-guided right glenohumeral joint corticosteroid injection.      Prieto Alejandra DO, AMADEO  [facility] Sports Medicine  [facility] - Department of Orthopedic Surgery       Disclaimer:  This note was prepared and written using Dragon Medical dictation software. As a result, there may be errors in the script that have gone undetected. Please consider this when interpreting the information in this note.

## 2025-07-22 ENCOUNTER — VIRTUAL VISIT (OUTPATIENT)
Dept: ORTHOPEDICS | Facility: CLINIC | Age: 55
End: 2025-07-22
Payer: COMMERCIAL

## 2025-07-22 DIAGNOSIS — M94.211 GLENOID CHONDROMALACIA OF RIGHT SHOULDER: ICD-10-CM

## 2025-07-22 DIAGNOSIS — M67.911 TENDINOPATHY OF ROTATOR CUFF, RIGHT: Primary | ICD-10-CM

## 2025-07-22 PROCEDURE — 98014 SYNCH AUDIO-ONLY EST MOD 30: CPT | Performed by: STUDENT IN AN ORGANIZED HEALTH CARE EDUCATION/TRAINING PROGRAM

## 2025-07-22 NOTE — LETTER
2025      Jose Luis Gonzáles  [address] [place] [address]      Dear Colleague,    Thank you for referring your patient, Jose Luis Gonzáles, to the [facility]. Please see a copy of my visit note below.    Jose Luis is a 55 year old who is being evaluated via a billable telephone visit.    What phone number would you like to be contacted at? [phone]  How would you like to obtain your AVS? MyChart  Originating Location (pt. Location): Home    Distant Location (provider location):  On-site  Telephone visit completed due to the patient did not consent to a video visit.  Phone call duration: 10 minutes  Total time:  30 minutes were spent on the date of the encounter doing chart review, history, imaging interpretation, MDM, counseling, documentation and further activities per the note.        Jose Luis Gonzáles  :  1970  DOS: 2025  MRN: [id number]  PCP: Tonie Cui    Sports Medicine Clinic Visit      Interim History - 2025  - Last seen on 2025 for right shoulder pain, posttraumatic, with concern for traumatic rotator cuff tear.  MRI was obtained to evaluate the integrity of the soft tissues of the shoulder and presents by telephone visit today for discussion of results and next steps in treatment.    - MRI right shoulder 7/3/2025   1. Mild to moderate osteoarthrosis at the acromioclavicular joint.     2. Focal area of subchondral edema along the anterior inferior glenoid  with adjacent cartilage heterogeneity, with possible cartilage  delamination.     3. Tendinosis of the rotator cuff tendons without full-thickness tear  or tendon retraction. Mild to moderate articular and bursal sided  partial-thickness tearing of the posterior supraspinatus tendon.     4. Minimal fatty infiltration within the teres minor muscle, otherwise  the remaining rotator cuff and deltoid musculature is intact.    5. Normal appearing biceps tendon.    - Since the  last visit, no significant change in symptoms.   - No interim injury.       Initial Visit: June 24, 2025  HPI  Jose Luis Christoph Gonzáles is a 55 year old male who is seen in consultation at the request of  Tonie Cui M.D. presenting with right shoulder pain.    - Mechanism of Injury:    - Heavy 370 lb motor fell on his right arm in the Summer 2024. Workers comp claim  - Pertinent history and prior evaluations:    - 6/6/2025 visit with Dr. Cui: suspected impingement syndrome but possible rotator cuff tear.   -1/17/2025 right shoulder xray shows mild AC joint arthrosis. Glenohumeral joint space is maintained. No evidence of an acute fracture or dislocation.     - Pain Character:    - Location:  right arm, posterolateral  - Character:  ache, intermittent sharp with certain shoulder motions  - Duration:  1 year  - Course:  worsening  - Endorses:    - weakness of shoulder, atrophy of shoulder muscles, numbness and tingling of digits 4 and 5, pain as described above.   - Denies:    - instability, mechanical locking, radicular shooting pain  - Alleviating factors:    - Rest, activity modifications  - Aggravating factors:    - internal rotation, abduction. 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      Low back pain 8/23/2006 8.23.06 hx of low back pain-2 to 3 injuries-lifted out-board motor in March, injured  water skiing     Neck pain      Tobacco abuse 7/1/2011     Past Surgical History:   Procedure Laterality Date     CARDIAC SURGERY      procedure name unknown. pump between heart and lungs     HEAD & NECK SURGERY      hair transplants     Family History   Problem Relation Age of Onset     Prostate Cancer Father      Macular Degeneration Paternal Grandmother      Glaucoma Paternal Grandmother      Glaucoma Maternal Grandfather      Unknown/Adopted No family hx of      Family History Negative No family hx of      Asthma No family hx of      C.A.D. No family hx of      Diabetes No family hx of      Hypertension No family hx of      Cerebrovascular Disease No family hx of      Breast Cancer No family hx of      Cancer - colorectal No family hx of      Alcohol/Drug No family hx of      Allergies No family hx of      Alzheimer Disease No family hx of      Anesthesia Reaction No family hx of      Arthritis No family hx of      Blood Disease No family hx of      Cancer No family hx of      Cardiovascular No family hx of      Circulatory No family hx of      Congenital Anomalies No family hx of      Connective Tissue Disorder No family hx of      Depression No family hx of      Endocrine Disease No family hx of      Eye Disorder No family hx of      Genetic Disorder No family hx of      Gastrointestinal Disease No family hx of      Genitourinary Problems No family hx of      Gynecology No family hx of      Heart Disease No family hx of      Lipids No family hx of      Musculoskeletal Disorder No family hx of      Neurologic Disorder No family hx of      Obesity No family hx of      Osteoporosis No family hx of      Psychotic Disorder No family hx of      Respiratory No family hx of      Thyroid Disease No family hx of      Hearing Loss No family hx of        Objective  No physical exam due to the nature of the telephone visit.    Radiology  I independently reviewed the available relevant imaging in the chart with my  interpretations as above in HPI.     I independently reviewed today's new relevant imaging, with the following interpretation:  - MRI of the right shoulder 7/3/2025 shows cartilage delamination and subchondral edema along the anterior inferior glenoid, partial-thickness bursal sided tearing of the posterior supraspinatus tendon and tendinopathy within the rotator cuff tendons, chronic fatty infiltration of the teres minor, and moderate degenerative changes of the AC joint.      Assessment  1. Tendinopathy of rotator cuff, right    2. Glenoid chondromalacia of right shoulder          Plan  Jose Lusi Christoph Gonzáles is a pleasant 55 year old male that presents with chronic right shoulder pain.  He does endorse a significant history of a traumatic injury about a year ago where a heavy motor fell onto his right side.  Since the injury, he has had a lot of pain in the shoulder along with many other symptoms.  His pain is primarily in the posterolateral shoulder and hurts worse with several lifting activities and waterskiing.  He slalom skis and cannot hold the rope when extending out to the right and abduction and external rotation.  Also has a hard time pulling the starter on his snowmobile without severe pain and weakness.    On exam, he does have significant weakness and pain with external rotation > abduction, with an otherwise stable exam.  This raises at least reasonable concern that he is dealing with a significant rotator cuff tear.  Most suspicious of an infraspinatus tear and possibility for a supraspinatus tear as well.    Based on the severity of his pain and weakness and chronicity of symptoms, it will be important to evaluate the integrity of the rotator cuff and associated structures with advanced imaging (MRI) to rule out a complete tear.  MRI order has been placed and we will follow-up when results are available to discuss the results and available treatment options.      Update - 7/22/25:  Jose Luis presents for  follow-up of his chronic right shoulder pain and to discuss the results of his recent MRI.  The MRI did reveal a substantial amount of tendinopathy and a partial-thickness bursal sided tear of the posterior supraspinatus and chronic fatty infiltration within the teres minor.  His significant rotator cuff pathology correlates well with his presenting symptoms on history and exam.  He also has a significant amount of subchondral edema within the anterior inferior glenoid with adjacent cartilage delamination.  This also seems to correlate well with his presenting history and symptoms.  We discussed the results of the MRI in detail and available treatment options.  At this time, I would recommend nonoperative treatment with corticosteroid injections and physical therapy, this would be his preference as well.  I placed an order for physical therapy and we will see him next week for an ultrasound-guided right glenohumeral joint corticosteroid injection.      Prieto Alejandra DO, CAQSM  [facility]  [facility] - Department of Orthopedic Surgery       Disclaimer:  This note was prepared and written using Dragon Medical dictation software. As a result, there may be errors in the script that have gone undetected. Please consider this when interpreting the information in this note.      Again, thank you for allowing me to participate in the care of your patient.        Sincerely,        Prieto Alejandra DO    Electronically signed

## 2025-07-28 ENCOUNTER — OFFICE VISIT (OUTPATIENT)
Dept: FAMILY MEDICINE | Facility: CLINIC | Age: 55
End: 2025-07-28
Payer: COMMERCIAL

## 2025-07-28 VITALS
TEMPERATURE: 98.7 F | DIASTOLIC BLOOD PRESSURE: 79 MMHG | BODY MASS INDEX: 1.37 KG/M2 | SYSTOLIC BLOOD PRESSURE: 124 MMHG | WEIGHT: 9.82 LBS | HEART RATE: 62 BPM | RESPIRATION RATE: 16 BRPM | HEIGHT: 71 IN | OXYGEN SATURATION: 98 %

## 2025-07-28 DIAGNOSIS — M94.211 GLENOID CHONDROMALACIA OF RIGHT SHOULDER: ICD-10-CM

## 2025-07-28 DIAGNOSIS — M67.911 TENDINOPATHY OF RIGHT ROTATOR CUFF: Primary | ICD-10-CM

## 2025-07-28 PROCEDURE — 99214 OFFICE O/P EST MOD 30 MIN: CPT | Performed by: FAMILY MEDICINE

## 2025-07-28 ASSESSMENT — PAIN SCALES - GENERAL: PAINLEVEL_OUTOF10: MODERATE PAIN (5)

## 2025-07-28 NOTE — LETTER
July 28, 2025      Jose Luis Christoph Gonzáles  [address] [place] [address]        DOI: July 2024    Diagnosis: Right shoulder injury including rotator cuff tendinopathy and glenoid chondromalacia.    Work-related: Yes.    MMI: No.    Recommend limited use of right arm through August 31 2025.  - No pushing or pulling  - No lifting greater than 5 pounds  - OK to wear sling for comfort    Will continue with therapy and treatments as recommended by orthopedics.  Plan reevaluation in 1 month          Sincerely,        Tonie Cui MD    Electronically signed

## 2025-07-28 NOTE — PROGRESS NOTES
"  Assessment & Plan     DOI:  July 2024    Tendinopathy of right rotator cuff  Reviewed recent MRI results and orthopedic follow-up.  Significant traumatic tendinopathy and glenoid chondromalacia consistent with mechanism of injury.  Discussed with patient that at this time it does not seem to be a surgical issue and the plan would be for rehabilitation.  Further plans as directed by orthopedics.  But in the meantime he does need some relative rest of that shoulder to allow healing.  So we are going to place some restrictions on use of his right arm at work.  Plan follow-up in 3 to 4 weeks.  Okay to wear sling for comfort but I cautioned him not to wear it too often as this could turn into limited mobility and even a frozen shoulder.    Glenoid chondromalacia of right shoulder  As above        Subjective   Jose Luis is a 55 year old, presenting for the following health issues:  Results        7/28/2025     3:19 PM   Additional Questions   Roomed by Julia BREEN   Accompanied by self     History of Present Illness       Reason for visit:  Follow up injury  Symptom onset:  More than a month  Symptom intensity:  Severe  Symptom progression:  Staying the same  Had these symptoms before:  No    He eats 2-3 servings of fruits and vegetables daily.He consumes 3 sweetened beverage(s) daily.He exercises with enough effort to increase his heart rate 60 or more minutes per day.  He exercises with enough effort to increase his heart rate 7 days per week. He is missing 1 dose(s) of medications per week.            Here today in follow-up of right shoulder injury as above      Review of Systems  Constitutional, HEENT, cardiovascular, pulmonary, gi and gu systems are negative, except as otherwise noted.      Objective    /79 (BP Location: Right arm, Patient Position: Sitting, Cuff Size: Adult Large)   Pulse 62   Temp 98.7  F (37.1  C) (Temporal)   Resp 16   Ht 1.791 m (5' 10.5\")   Wt 4.454 kg (9 lb 13.1 oz)   SpO2 98%   BMI " 1.39 kg/m    Body mass index is 1.39 kg/m .  Physical Exam   Alert, pleasant, upbeat, and in no apparent discomfort.  S1 and S2 normal, no murmurs, clicks, gallops or rubs. Regular rate and rhythm. Chest is clear; no wheezes or rales. No edema or JVD.  Past labs reviewed with the patient.   Reviewed imaging            Signed Electronically by: Tonie Cui MD

## 2025-08-13 DIAGNOSIS — F31.9 BIPOLAR AFFECTIVE DISORDER, REMISSION STATUS UNSPECIFIED (H): ICD-10-CM

## 2025-08-13 DIAGNOSIS — F41.1 GENERALIZED ANXIETY DISORDER: ICD-10-CM

## 2025-08-13 RX ORDER — CLONAZEPAM 1 MG/1
1 TABLET ORAL 3 TIMES DAILY PRN
Qty: 60 TABLET | Refills: 0 | Status: SHIPPED | OUTPATIENT
Start: 2025-08-13